# Patient Record
Sex: MALE | Race: BLACK OR AFRICAN AMERICAN | NOT HISPANIC OR LATINO | Employment: OTHER | URBAN - METROPOLITAN AREA
[De-identification: names, ages, dates, MRNs, and addresses within clinical notes are randomized per-mention and may not be internally consistent; named-entity substitution may affect disease eponyms.]

---

## 2018-01-16 NOTE — PROCEDURES
Procedures by Ines Ríos RN at  6/23/2016  1:11 PM      Author:  Ines Ríos RN Service:  (none) Author Type:  Registered Nurse     Filed:  6/23/2016  1:46 PM Date of Service:  6/23/2016  1:11 PM Status:  Signed     :  Ines Ríos RN (Registered Nurse)         Procedure Orders:       1  Insert PICC line [id number] ordered by Malcolm Bhatti at 06/22/16 1043                    Insert PICC line  Date/Time: 6/23/2016 1:11 PM  Performed by: Shae Moore by: Tae Fall     Patient location:  Bedside  Other Assisting Provider:  Yes (comment) (Ines Ríos RN)    Consent:     Consent obtained:  Verbal and written    Consent given by:  Patient    Risks discussed:  Infection    Alternatives discussed:  No treatment and delayed treatment  Universal protocol:     Procedure explained and questions answered to patient or proxy's satisfaction: yes      Relevant documents present and verified: yes      Site/side marked: yes      Immediately prior to procedure,  a time out was called: yes      Patient identity confirmed:  Verbally with patient and arm band  Pre-procedure details:     Hand hygiene: Hand hygiene performed prior to insertion      Sterile barrier technique: All elements of maximal sterile technique followed      Skin preparation:  2% chlorhexidine    Skin preparation agent: Skin preparation agent completely dried prior to procedure    Indications:     PICC line indications: long term antibiotics    Anesthesia (see MAR for exact dosages):      Anesthesia method:  None  Procedure details:     Location:  Basilic    Vessel type: vein      Laterality:  Right    Approach: percutaneous technique used      Patient position:  Flat    Procedural supplies:  Double lumen    Catheter size:  5 Fr    Landmarks identified: yes      Ultrasound guidance: yes      Sterile ultrasound techniques: Sterile gel and sterile probe covers were used      Number of attempts:  1    Successful placement: yes Vessel of catheter tip end:  SVC    Total catheter length (cm):  47    Catheter out on skin (cm):  2    Max flow rate:  5ML/SEC    Arm circumference:  34  Post-procedure details:     Post-procedure:  Dressing applied and securement device placed    Assessment:  Blood return through all ports and free fluid flow    Post-procedure complications: none      Patient tolerance of procedure: Tolerated well, no immediate complications  Comments:      PICC tip placement confirmed using [employer] 3CG [employer]api[employer] technology  Good to use, RN aware                       Received for:Provider  EPIC   Jun 23 2016  1:45PM [facility] Standard Time

## 2018-01-17 ENCOUNTER — HOSPITAL ENCOUNTER (INPATIENT)
Facility: HOSPITAL | Age: 53
LOS: 2 days | Discharge: HOME HEALTH | DRG: 314 | End: 2018-01-19
Attending: PODIATRIST | Admitting: PODIATRIST
Payer: COMMERCIAL

## 2018-01-17 ENCOUNTER — APPOINTMENT (INPATIENT)
Dept: RADIOLOGY | Facility: HOSPITAL | Age: 53
DRG: 314 | End: 2018-01-17
Payer: COMMERCIAL

## 2018-01-17 ENCOUNTER — APPOINTMENT (INPATIENT)
Dept: NON INVASIVE DIAGNOSTICS | Facility: HOSPITAL | Age: 53
DRG: 314 | End: 2018-01-17
Payer: COMMERCIAL

## 2018-01-17 ENCOUNTER — ANESTHESIA EVENT (INPATIENT)
Dept: PERIOP | Facility: HOSPITAL | Age: 53
DRG: 314 | End: 2018-01-17
Payer: COMMERCIAL

## 2018-01-17 DIAGNOSIS — I25.10 CAD IN NATIVE ARTERY: ICD-10-CM

## 2018-01-17 DIAGNOSIS — Z95.810 HISTORY OF IMPLANTABLE CARDIOVERTER-DEFIBRILLATOR (ICD) PLACEMENT: ICD-10-CM

## 2018-01-17 DIAGNOSIS — Z98.890 S/P FOOT SURGERY, RIGHT: ICD-10-CM

## 2018-01-17 DIAGNOSIS — M86.9 TOE OSTEOMYELITIS, RIGHT (HCC): ICD-10-CM

## 2018-01-17 DIAGNOSIS — I10 ESSENTIAL HYPERTENSION: ICD-10-CM

## 2018-01-17 DIAGNOSIS — I25.2 MYOCARDIAL INFARCT, OLD: Primary | ICD-10-CM

## 2018-01-17 LAB
ALBUMIN SERPL BCP-MCNC: 2 G/DL (ref 3.5–5)
ALP SERPL-CCNC: 62 U/L (ref 46–116)
ALT SERPL W P-5'-P-CCNC: 8 U/L (ref 12–78)
ANION GAP SERPL CALCULATED.3IONS-SCNC: 9 MMOL/L (ref 4–13)
AST SERPL W P-5'-P-CCNC: 19 U/L (ref 5–45)
ATRIAL RATE: 67 BPM
BASOPHILS # BLD AUTO: 0.06 THOUSANDS/ΜL (ref 0–0.1)
BASOPHILS NFR BLD AUTO: 1 % (ref 0–1)
BILIRUB SERPL-MCNC: 0.34 MG/DL (ref 0.2–1)
BUN SERPL-MCNC: 19 MG/DL (ref 5–25)
CALCIUM SERPL-MCNC: 8.3 MG/DL (ref 8.3–10.1)
CHLORIDE SERPL-SCNC: 104 MMOL/L (ref 100–108)
CO2 SERPL-SCNC: 26 MMOL/L (ref 21–32)
CREAT SERPL-MCNC: 2.18 MG/DL (ref 0.6–1.3)
CRP SERPL QL: 47 MG/L
EOSINOPHIL # BLD AUTO: 0.19 THOUSAND/ΜL (ref 0–0.61)
EOSINOPHIL NFR BLD AUTO: 2 % (ref 0–6)
ERYTHROCYTE [DISTWIDTH] IN BLOOD BY AUTOMATED COUNT: 12.6 % (ref 11.6–15.1)
ERYTHROCYTE [SEDIMENTATION RATE] IN BLOOD: 118 MM/HOUR (ref 0–10)
EST. AVERAGE GLUCOSE BLD GHB EST-MCNC: 146 MG/DL
GFR SERPL CREATININE-BSD FRML MDRD: 39 ML/MIN/1.73SQ M
GLUCOSE SERPL-MCNC: 115 MG/DL (ref 65–140)
GLUCOSE SERPL-MCNC: 140 MG/DL (ref 65–140)
GLUCOSE SERPL-MCNC: 179 MG/DL (ref 65–140)
HBA1C MFR BLD: 6.7 % (ref 4.2–6.3)
HCT VFR BLD AUTO: 30.6 % (ref 36.5–49.3)
HGB BLD-MCNC: 10.6 G/DL (ref 12–17)
LYMPHOCYTES # BLD AUTO: 3.15 THOUSANDS/ΜL (ref 0.6–4.47)
LYMPHOCYTES NFR BLD AUTO: 32 % (ref 14–44)
MCH RBC QN AUTO: 32.9 PG (ref 26.8–34.3)
MCHC RBC AUTO-ENTMCNC: 34.6 G/DL (ref 31.4–37.4)
MCV RBC AUTO: 95 FL (ref 82–98)
MONOCYTES # BLD AUTO: 1.08 THOUSAND/ΜL (ref 0.17–1.22)
MONOCYTES NFR BLD AUTO: 11 % (ref 4–12)
NEUTROPHILS # BLD AUTO: 5.5 THOUSANDS/ΜL (ref 1.85–7.62)
NEUTS SEG NFR BLD AUTO: 54 % (ref 43–75)
NRBC BLD AUTO-RTO: 0 /100 WBCS
P AXIS: 35 DEGREES
PLATELET # BLD AUTO: 174 THOUSANDS/UL (ref 149–390)
PLATELET # BLD AUTO: 352 THOUSANDS/UL (ref 149–390)
PMV BLD AUTO: 10.2 FL (ref 8.9–12.7)
PMV BLD AUTO: 11 FL (ref 8.9–12.7)
POTASSIUM SERPL-SCNC: 3.4 MMOL/L (ref 3.5–5.3)
PR INTERVAL: 208 MS
PROT SERPL-MCNC: 8.1 G/DL (ref 6.4–8.2)
QRS AXIS: 21 DEGREES
QRSD INTERVAL: 104 MS
QT INTERVAL: 386 MS
QTC INTERVAL: 407 MS
RBC # BLD AUTO: 3.22 MILLION/UL (ref 3.88–5.62)
SODIUM SERPL-SCNC: 139 MMOL/L (ref 136–145)
T WAVE AXIS: -41 DEGREES
VENTRICULAR RATE: 67 BPM
WBC # BLD AUTO: 9.98 THOUSAND/UL (ref 4.31–10.16)

## 2018-01-17 PROCEDURE — 87205 SMEAR GRAM STAIN: CPT | Performed by: PODIATRIST

## 2018-01-17 PROCEDURE — 87186 SC STD MICRODIL/AGAR DIL: CPT | Performed by: PODIATRIST

## 2018-01-17 PROCEDURE — 87147 CULTURE TYPE IMMUNOLOGIC: CPT | Performed by: PODIATRIST

## 2018-01-17 PROCEDURE — 36415 COLL VENOUS BLD VENIPUNCTURE: CPT | Performed by: PODIATRIST

## 2018-01-17 PROCEDURE — 93005 ELECTROCARDIOGRAM TRACING: CPT

## 2018-01-17 PROCEDURE — 85652 RBC SED RATE AUTOMATED: CPT | Performed by: PODIATRIST

## 2018-01-17 PROCEDURE — 83036 HEMOGLOBIN GLYCOSYLATED A1C: CPT | Performed by: PODIATRIST

## 2018-01-17 PROCEDURE — 93005 ELECTROCARDIOGRAM TRACING: CPT | Performed by: PODIATRIST

## 2018-01-17 PROCEDURE — 82948 REAGENT STRIP/BLOOD GLUCOSE: CPT

## 2018-01-17 PROCEDURE — 94760 N-INVAS EAR/PLS OXIMETRY 1: CPT

## 2018-01-17 PROCEDURE — 86140 C-REACTIVE PROTEIN: CPT | Performed by: PODIATRIST

## 2018-01-17 PROCEDURE — 71046 X-RAY EXAM CHEST 2 VIEWS: CPT

## 2018-01-17 PROCEDURE — 93971 EXTREMITY STUDY: CPT

## 2018-01-17 PROCEDURE — 73630 X-RAY EXAM OF FOOT: CPT

## 2018-01-17 PROCEDURE — 99285 EMERGENCY DEPT VISIT HI MDM: CPT

## 2018-01-17 PROCEDURE — 80053 COMPREHEN METABOLIC PANEL: CPT | Performed by: PODIATRIST

## 2018-01-17 PROCEDURE — 85025 COMPLETE CBC W/AUTO DIFF WBC: CPT | Performed by: PODIATRIST

## 2018-01-17 PROCEDURE — 87070 CULTURE OTHR SPECIMN AEROBIC: CPT | Performed by: PODIATRIST

## 2018-01-17 PROCEDURE — 85049 AUTOMATED PLATELET COUNT: CPT | Performed by: PODIATRIST

## 2018-01-17 RX ORDER — METRONIDAZOLE 500 MG/1
500 TABLET ORAL EVERY 8 HOURS SCHEDULED
Status: DISCONTINUED | OUTPATIENT
Start: 2018-01-17 | End: 2018-01-19 | Stop reason: HOSPADM

## 2018-01-17 RX ORDER — SIMVASTATIN 10 MG
10 TABLET ORAL
COMMUNITY
End: 2019-04-25 | Stop reason: ALTCHOICE

## 2018-01-17 RX ORDER — SODIUM CHLORIDE 9 MG/ML
75 INJECTION, SOLUTION INTRAVENOUS CONTINUOUS
Status: DISCONTINUED | OUTPATIENT
Start: 2018-01-17 | End: 2018-01-19 | Stop reason: HOSPADM

## 2018-01-17 RX ORDER — HEPARIN SODIUM 5000 [USP'U]/ML
5000 INJECTION, SOLUTION INTRAVENOUS; SUBCUTANEOUS EVERY 8 HOURS SCHEDULED
Status: DISCONTINUED | OUTPATIENT
Start: 2018-01-17 | End: 2018-01-19 | Stop reason: HOSPADM

## 2018-01-17 RX ORDER — ASPIRIN 81 MG/1
81 TABLET, CHEWABLE ORAL DAILY
Status: DISCONTINUED | OUTPATIENT
Start: 2018-01-18 | End: 2018-01-17

## 2018-01-17 RX ORDER — PRAVASTATIN SODIUM 20 MG
20 TABLET ORAL
Status: DISCONTINUED | OUTPATIENT
Start: 2018-01-17 | End: 2018-01-19 | Stop reason: HOSPADM

## 2018-01-17 RX ORDER — NIFEDIPINE 30 MG/1
30 TABLET, EXTENDED RELEASE ORAL DAILY
Status: DISCONTINUED | OUTPATIENT
Start: 2018-01-17 | End: 2018-01-19 | Stop reason: HOSPADM

## 2018-01-17 RX ORDER — METOPROLOL SUCCINATE 25 MG/1
25 TABLET, EXTENDED RELEASE ORAL DAILY
Status: DISCONTINUED | OUTPATIENT
Start: 2018-01-18 | End: 2018-01-17

## 2018-01-17 RX ORDER — ACETAMINOPHEN 325 MG/1
650 TABLET ORAL EVERY 6 HOURS PRN
Status: DISCONTINUED | OUTPATIENT
Start: 2018-01-17 | End: 2018-01-19 | Stop reason: HOSPADM

## 2018-01-17 RX ORDER — ASPIRIN 81 MG/1
81 TABLET, CHEWABLE ORAL DAILY
Status: DISCONTINUED | OUTPATIENT
Start: 2018-01-17 | End: 2018-01-19 | Stop reason: HOSPADM

## 2018-01-17 RX ORDER — METOPROLOL SUCCINATE 25 MG/1
25 TABLET, EXTENDED RELEASE ORAL DAILY
Status: DISCONTINUED | OUTPATIENT
Start: 2018-01-17 | End: 2018-01-19 | Stop reason: HOSPADM

## 2018-01-17 RX ORDER — NIFEDIPINE 30 MG/1
30 TABLET, FILM COATED, EXTENDED RELEASE ORAL DAILY
COMMUNITY
End: 2019-01-24 | Stop reason: SDDI

## 2018-01-17 RX ORDER — NIFEDIPINE 30 MG/1
30 TABLET, EXTENDED RELEASE ORAL DAILY
Status: DISCONTINUED | OUTPATIENT
Start: 2018-01-18 | End: 2018-01-17

## 2018-01-17 RX ORDER — METOPROLOL SUCCINATE 25 MG/1
25 TABLET, EXTENDED RELEASE ORAL DAILY
COMMUNITY
End: 2018-08-11 | Stop reason: SDUPTHER

## 2018-01-17 RX ADMIN — PRAVASTATIN SODIUM 20 MG: 20 TABLET ORAL at 17:14

## 2018-01-17 RX ADMIN — METRONIDAZOLE 500 MG: 500 TABLET ORAL at 22:05

## 2018-01-17 RX ADMIN — HEPARIN SODIUM 5000 UNITS: 5000 INJECTION, SOLUTION INTRAVENOUS; SUBCUTANEOUS at 22:05

## 2018-01-17 RX ADMIN — METRONIDAZOLE 500 MG: 500 TABLET ORAL at 17:14

## 2018-01-17 RX ADMIN — HEPARIN SODIUM 5000 UNITS: 5000 INJECTION, SOLUTION INTRAVENOUS; SUBCUTANEOUS at 14:36

## 2018-01-17 RX ADMIN — NIFEDIPINE 30 MG: 30 TABLET, FILM COATED, EXTENDED RELEASE ORAL at 17:14

## 2018-01-17 RX ADMIN — ASPIRIN 81 MG 81 MG: 81 TABLET ORAL at 17:15

## 2018-01-17 RX ADMIN — METOPROLOL SUCCINATE 25 MG: 25 TABLET, EXTENDED RELEASE ORAL at 17:15

## 2018-01-17 RX ADMIN — CEFAZOLIN SODIUM 1000 MG: 1 SOLUTION INTRAVENOUS at 17:14

## 2018-01-17 RX ADMIN — SODIUM CHLORIDE 75 ML/HR: 0.9 INJECTION, SOLUTION INTRAVENOUS at 12:30

## 2018-01-17 NOTE — ANESTHESIA PREPROCEDURE EVALUATION
Review of Systems/Medical History  Patient summary reviewed    No history of anesthetic complications     Cardiovascular  EKG reviewed, Hyperlipidemia, Hypertension controlled, Past MI > 6 months, CAD, ,   Comment: Has ICD    H/o MI with cocaine use in remote past ,  Pulmonary  Smoker ex-smoker  ,        GI/Hepatic       Chronic kidney disease stage 2,        Endo/Other  Diabetes poorly controlled type 2 ,   Comment: [place] Obesity  morbid obesity   GYN       Hematology   Musculoskeletal       Neurology    Neuromuscular disease ,   Comment: Peripheral neuropathy  Psychology   Depression ,              Physical Exam    Airway    Mallampati score: II  TM Distance: >3 FB  Neck ROM: full     Dental   lower dentures and upper dentures,     Cardiovascular  Rhythm: regular, Rate: normal,     Pulmonary  Pulmonary exam normal     Other Findings        Anesthesia Plan  ASA Score- 3     Anesthesia Type- IV sedation with anesthesia with ASA Monitors  Additional Monitors:   Airway Plan:         Plan Factors-    Induction- intravenous  Postoperative Plan-     Informed Consent- Anesthetic plan and risks discussed with patient

## 2018-01-17 NOTE — CASE MANAGEMENT
Initial Clinical Review    Admission: Date/Time/Statement: 1/17/18 @ 0959     Orders Placed This Encounter   Procedures    Inpatient Admission     Standing Status:   Standing     Number of Occurrences:   1     Order Specific Question:   Admitting Physician     Answer:   Dunia Moore     Order Specific Question:   Level of Care     Answer:   Med Surg [16]     Order Specific Question:   Estimated length of stay     Answer:   More than 2 Midnights     Order Specific Question:   Certification     Answer:   I certify that inpatient services are medically necessary for this patient for a duration of greater than two midnights  See H&P and MD Progress Notes for additional information about the patient's course of treatment  Comments:   IV antibiotics and surgical intervention         ED: Date/Time/Mode of Arrival:   ED Arrival Information     Expected Arrival Acuity Means of Arrival Escorted By Service Admission Type    - 1/17/2018 08:36 Urgent Walk-In Self Podiatry Urgent    Arrival Complaint    Toe Problem          Chief Complaint:   Chief Complaint   Patient presents with    Toe Pain     Reports to come to ED for ambutation of second digit on R foot; surgeon told him to come to ED  History of Illness:    Patient is a pleasant 78-year-old male with past medical history significant for CAD, hypertension, diabetes mellitus with peripheral neuropathy and history of MI with ICD placement  He presents today for concern over worsening right 2nd toe infection with wound  Patient states that he was recently on Keflex and finished his last dose on Friday and states that he was feeling better while on the antibiotics but since this has been completed he has noticed increased drainage and night sweats    He states that he has had this right 2nd toe wound for approximately 3 months and has been doing local wound care with visiting nurses and frequent follow-up visits with Dr Khalida Rivera, his podiatrist   He does have history of recurrent ulcerations and history of amputation, most notably to left foot transmetatarsal amputation and right foot partial 1st ray amputation  Patient has no feeling to the distal the bilaterally  He currently wears diabetic shoe with a filler to the left and a surgical shoe to the right  He states that within the last week he has noticed night sweats and chills  He denies any nausea, vomiting, fever, constipation, diarrhea, blurry vision, headaches, abdominal pain or lower extremity weakness       Of note, patient states that he recently started seeing a nephrologist for further workup of protein urea and microscopic blood within the urine  He currently states that he has no issues urinating at present time  He has follow-up scheduled with his nephrologist at [facility]         ED Vital Signs:   ED Triage Vitals   Temperature Pulse Respirations Blood Pressure SpO2   01/17/18 0843 01/17/18 0843 01/17/18 0843 01/17/18 0845 01/17/18 0843   99 3 °F (37 4 °C) 91 18 (!) 175/82 100 %      Temp Source Heart Rate Source Patient Position - Orthostatic VS BP Location FiO2 (%)   01/17/18 0843 01/17/18 1439 01/17/18 1439 01/17/18 1439 --   Oral Monitor Lying Right arm       Pain Score       01/17/18 0843       5        Wt Readings from Last 1 Encounters:   01/17/18 124 kg (274 lb 0 5 oz)       Vital Signs (abnormal): Above    Abnormal Labs/Diagnostic Test Results:   Bl   VDE:  No  Evidence  DVT  X ray  R  Foot:        Bony irregularity 2nd digit at the head of the proximal phalanx/PIP joint   Difficult to determine with certainty if this is post traumatic in nature or potentially related to osteomyelitis given flexion of the distal digit   I slightly favor   osteomyelitis   If relevant cone-down imaging of the 2nd digit (flattening the digit to eliminate flexion) may be helpful   Follow-up is advised          CXR:     NAD    ED Treatment:   Medication Administration from 01/17/2018 0835 to 01/17/2018 1536       Date/Time Order Dose Route Action Action by Comments     01/17/2018 1436 heparin (porcine) subcutaneous injection 5,000 Units 5,000 Units Subcutaneous Given Ruth Ramos RN      01/17/2018 1356 insulin lispro (HumaLOG) 100 units/mL subcutaneous injection 1-5 Units 1 Units Subcutaneous Not Given Wyatt Serrano RN Pt refusing to have his glucose checked  01/17/2018 1230 sodium chloride 0 9 % infusion 75 mL/hr Intravenous New Bag Wyatt Serrano RN           Past Medical/Surgical History: Active Ambulatory Problems     Diagnosis Date Noted    Cellulitis of foot, left 02/02/2016    Dehiscence of surgical wound 02/02/2016    Acute osteomyelitis of metatarsal bone of left foot ([facility] [address] ) 06/13/2016    Peripheral neuropathy     Myocardial infarct, old 01/01/2006    Hypertension     History of implantable cardioverter-defibrillator (ICD) placement     Type 2 diabetes mellitus with hyperlipidemia ([facility] [address] )     CAD in native artery     Acute right ankle pain 07/12/2016    Hypokalemia 07/13/2016    Anemia 07/13/2016    Hypovitaminosis D 07/16/2016     Resolved Ambulatory Problems     Diagnosis Date Noted    No Resolved Ambulatory Problems     Past Medical History:   Diagnosis Date    CAD in native artery     Depression     Diabetes mellitus ([facility] [address] )     History of implantable cardioverter-defibrillator (ICD) placement     Hypertension     Myocardial infarct, old 2006    Peripheral neuropathy        Admitting Diagnosis: Toe pain [M79 [id number]]    Age/Sex: 47 y o  male    Assessment/Plan:      50-year-old male that presents with a right 2nd toe dorsal ulceration with concern for underlying osteomyelitis secondary to diabetes mellitus with peripheral neuropathy  -wound cultures taken in the emergency department  Previously, patient has had MSSA and his last cultures  Will start on Ancef and Flagyl    Per medical records, patient has tolerated and Ancef during prior hospitalization despite the penicillin allergy  -x-rays pending however osteomyelitis likely because of bone exposed with an 2nd toe wound with softened cortices  -for consult Internal Medicine for surgical clearance and medical management  -current dressed with dry sterile dressing  We will plan for OR intervention at 11:30 a m  for right 2nd toe amp tomorrow with Dr Kalli Mercer  Consent will be signed and reviewed by surgeon tomorrow  NPO at midnight  Will hold heparin dose tomorrow   -weight-bearing in surgical shoe until surgery  -CBC, CMP, A1c, ESR, CRP pending  -fortunately, vital signs stable  -venous duplex pending for right calf pain        2  Diabetes mellitus, type 2  -will hold patient's Januvia and jardiance while in house  Will place on insulin sliding scale t i d , HS   -A1c pending  No recent A1c in patient's chart  -of note, patient states has been having proteinuria and microscopic blood within the urine  Well as Internal Medicine to evaluate for further workup  3   Hypertension  -continue metoprolol and nifedipine     4  Coronary artery disease  -EKG pending for preop clearance  -continue aspirin and statin  -history of ICD placement  -history of MI in 2006     5  DVT prophylaxis  -heparin  -patient complains of right calf pain that is unrelenting, especially with palpation to area  Will order a venous duplex        6   Code status:  Level 1 full code discussed with patient     Disposition:  Patient will be admitted to hospital for minimum 2 midnight stay for surgical intervention tomorrow and IV antibiotics      Admission Orders:   IP    1/17  @    1004  Scheduled Meds:   heparin (porcine) 5,000 Units Subcutaneous Q8H [address]   insulin lispro 1-5 Units Subcutaneous TID AC   insulin lispro 1-5 Units Subcutaneous HS     Continuous Infusions:   sodium chloride 75 mL/hr Last Rate: 75 mL/hr (01/17/18 1230)     PRN Meds:   acetaminophen     Cons IM  Reg diet  Wound  C/s  Plan  OR   1/18    Thank you,  [address] [facility] in [facility] by Jaylon Morrison for 2017  Network Utilization Review Department  Phone: [phone]; Fax [phone]  ATTENTION: The Network Utilization Review Department is now centralized for our 7 Facilities  Make a note that we have a new phone and fax numbers for our Department  Please call with any questions or concerns to [phone] and carefully follow the prompts so that you are directed to the right person  All voicemails are confidential  Fax any determinations, approvals, denials, and requests for initial or continue stay review clinical to [phone]  Due to HIGH CALL volume, it would be easier if you could please send faxed requests to expedite your requests and in part, help us provide discharge notifications faster

## 2018-01-17 NOTE — ED NOTES
Patient refusing fingerstick  Stating, "It hurts too much when you use your needles " Pt attempted to use his own lancet, but it was dull and he was unable to use it  Pt reporting that "I don't have to eat, if it makes a difference " Pt educated that it is important to get sugar level       Yamilka Lindquist, RN  01/17/18 [address], RN  01/17/18 2346

## 2018-01-17 NOTE — H&P
H&P Exam - Michaelmaurisio Wilkes  47 y o  male MRN: [id number]    Unit/Bed#: ED 14 Encounter: [id number]    Assessment/Plan:  3  70-year-old male that presents with a right 2nd toe dorsal ulceration with concern for underlying osteomyelitis secondary to diabetes mellitus with peripheral neuropathy  -wound cultures taken in the emergency department  Previously, patient has had MSSA and his last cultures  Will start on Ancef and Flagyl  Per medical records, patient has tolerated and Ancef during prior hospitalization despite the penicillin allergy  -x-rays pending however osteomyelitis likely because of bone exposed with an 2nd toe wound with softened cortices  -for consult Internal Medicine for surgical clearance and medical management  -current dressed with dry sterile dressing  We will plan for OR intervention at 11:30 a m  for right 2nd toe amp tomorrow with Dr Db Vázquez  Consent will be signed and reviewed by surgeon tomorrow  NPO at midnight  Will hold heparin dose tomorrow   -weight-bearing in surgical shoe until surgery  -CBC, CMP, A1c, ESR, CRP pending  -fortunately, vital signs stable  -venous duplex pending for right calf pain      2  Diabetes mellitus, type 2  -will hold patient's Januvia and jardiance while in house  Will place on insulin sliding scale t i d , HS   -A1c pending  No recent A1c in patient's chart  -of note, patient states has been having proteinuria and microscopic blood within the urine  Well as Internal Medicine to evaluate for further workup  3   Hypertension  -continue metoprolol and nifedipine    4  Coronary artery disease  -EKG pending for preop clearance  -continue aspirin and statin  -history of ICD placement  -history of MI in 2006    5  DVT prophylaxis  -heparin  -patient complains of right calf pain that is unrelenting, especially with palpation to area  Will order a venous duplex        6   Code status:  Level 1 full code discussed with patient    Disposition: Patient will be admitted to hospital for minimum 2 midnight stay for surgical intervention tomorrow and IV antibiotics  History of Present Illness   Patient is a pleasant 50-year-old male with past medical history significant for CAD, hypertension, diabetes mellitus with peripheral neuropathy and history of MI with ICD placement  He presents today for concern over worsening right 2nd toe infection with wound  Patient states that he was recently on Keflex and finished his last dose on Friday and states that he was feeling better while on the antibiotics but since this has been completed he has noticed increased drainage and night sweats  He states that he has had this right 2nd toe wound for approximately 3 months and has been doing local wound care with visiting nurses and frequent follow-up visits with Dr Arabella Melendrez, his podiatrist   He does have history of recurrent ulcerations and history of amputation, most notably to left foot transmetatarsal amputation and right foot partial 1st ray amputation  Patient has no feeling to the distal the bilaterally  He currently wears diabetic shoe with a filler to the left and a surgical shoe to the right  He states that within the last week he has noticed night sweats and chills  He denies any nausea, vomiting, fever, constipation, diarrhea, blurry vision, headaches, abdominal pain or lower extremity weakness  Of note, patient states that he recently started seeing a nephrologist for further workup of protein urea and microscopic blood within the urine  He currently states that he has no issues urinating at present time  He has follow-up scheduled with his nephrologist at [facility]  Review of Systems   Constitutional: Positive for chills  Negative for activity change and fever  Eyes: Negative for visual disturbance  Cardiovascular: Positive for leg swelling  Negative for chest pain     Gastrointestinal: Negative for abdominal pain, constipation, diarrhea, nausea and vomiting  Genitourinary: Negative for difficulty urinating and flank pain  Musculoskeletal: Positive for joint swelling  Skin: Positive for color change and wound  Neurological: Positive for numbness  Negative for weakness and headaches  Historical Information   Past Medical History:   Diagnosis Date    CAD in native artery     Depression     Diabetes mellitus ([facility] [address] [facility]     History of implantable cardioverter-defibrillator (ICD) placement     [employer]    Hypertension     Myocardial infarct, old 2006    in setting of cocaine use   Peripheral neuropathy      Past Surgical History:   Procedure Laterality Date    APPENDECTOMY      CARDIAC DEFIBRILLATOR PLACEMENT  2006    MASTECTOMY FOR GYNECOMASTIA  09/2015    TOE AMPUTATION      Hallux amputation    WOUND DEBRIDEMENT Left 6/16/2016    Procedure: DEBRIDEMENT FOOT/TOE [facility] OUT); Transmetatasral vs Lisfranc amputation , bone biopsy,;  Surgeon: Jeremiah Coley DPM;  Location: [facility];  Service:      Social History   History   Alcohol Use    Yes     Comment: social-every few months     History   Drug Use    Frequency: 1 0 time per week    Types: Marijuana     History   Smoking Status    Former Smoker    Packs/day: 1 00    Years: 18 00    Types: Cigarettes    Start date: 80    Quit date: 2006   Smokeless Tobacco    Never Used     Family History: non-contributory    Meds/Allergies   PTA meds:   Prior to Admission Medications   Prescriptions Last Dose Informant Patient Reported? Taking?    Empagliflozin (JARDIANCE) 25 MG TABS 1/16/2018 at Unknown time  Yes Yes   Sig: Take 25 mg by mouth every morning   NIFEdipine ER (ADALAT CC) 30 MG 24 hr tablet 1/16/2018 at Unknown time  Yes Yes   Sig: Take 30 mg by mouth daily   aspirin 81 MG tablet 1/16/2018 at Unknown time  Yes Yes   Sig: Take 81 mg by mouth daily   metoprolol succinate (TOPROL-XL) 25 mg 24 hr tablet 1/16/2018 at Unknown time  Yes Yes   Sig: Take 25 mg by mouth daily   simvastatin (ZOCOR) 10 mg tablet 1/16/2018 at Unknown time  Yes Yes   Sig: Take 10 mg by mouth daily at bedtime   sitaGLIPtin (JANUVIA) 50 mg tablet 1/16/2018 at Unknown time  Yes Yes   Sig: Take 50 mg by mouth daily      Facility-Administered Medications: None     Allergies   Allergen Reactions    Penicillins        Objective   First Vitals:   Blood Pressure: (!) 175/82 (01/17/18 0845)  Pulse: 91 (01/17/18 0843)  Temperature: 99 3 °F (37 4 °C) (01/17/18 0843)  Temp Source: Oral (01/17/18 0843)  Respirations: 18 (01/17/18 0843)  Weight - Scale: 124 kg (274 lb 0 5 oz) (01/17/18 0843)  SpO2: 100 % (01/17/18 0843)    Current Vitals:   Blood Pressure: (!) 175/82 (01/17/18 0845)  Pulse: 91 (01/17/18 0843)  Temperature: 99 3 °F (37 4 °C) (01/17/18 0843)  Temp Source: Oral (01/17/18 0843)  Respirations: 18 (01/17/18 0843)  Weight - Scale: 124 kg (274 lb 0 5 oz) (01/17/18 0843)  SpO2: 100 % (01/17/18 0843)    No intake or output data in the 24 hours ending 01/17/18 1013    Invasive Devices     Peripheral Intravenous Line            Peripheral IV 01/17/18 Left Antecubital less than 1 day                Physical Exam   Constitutional: He is oriented to person, place, and time  He appears well-developed and well-nourished  HENT:   Head: Normocephalic and atraumatic  Eyes: Conjunctivae are normal  Pupils are equal, round, and reactive to light  Neck: Normal range of motion  Cardiovascular: Normal rate, regular rhythm, S1 normal, S2 normal and intact distal pulses  Pulses:       Dorsalis pedis pulses are 2+ on the right side, and 2+ on the left side  Posterior tibial pulses are 2+ on the right side, and 2+ on the left side  DP and PT pulses palpable and dopplerable   Pulmonary/Chest: Effort normal and breath sounds normal  No accessory muscle usage  No respiratory distress  Abdominal: Soft  There is no tenderness  There is no guarding  Obese   Musculoskeletal: Normal range of motion     5/5 MSK strength to pedal joints bilaterally  No pain to palpation to right foot or right 2nd toe wound  Right calf pain with compression and squeeze  History of right partial 1st ray amputation  History of left transmetatarsal amputation  Right foot with digital contractures and right 2nd toe subluxation  Neurological: He is alert and oriented to person, place, and time  He has normal strength  A sensory deficit is present  No cranial nerve deficit  Protective and gross sensation absent to distal lower extremities bilaterally   Skin: Skin is warm and dry  There is a full-thickness ulceration to the dorsal right 2nd toe that measures 1 4 cm by 0 5 cm at greatest dimension  This probes 0 6 cm proximally and probes to soft cortices of phalanges which are exposed through the wound  There is purulence exuding from wound which was cultured  Wound is fibrotic in nature  Mild malodor  There are hyperkeratotic lesions to the right partial 1st ray amputation site which is healed in its entirety  No other wounds or preulcerative lesions noted  Psychiatric: His speech is normal and behavior is normal  He exhibits a depressed mood  Lab Results:  CBC, CMP, ESR, CRP, A1c pending  Imaging:  X-ray pending forefoot and chest  EKG, Pathology, and Other Studies:  EKG and wound cultures pending    Code Status: Level 1 - Full Code  Advance Directive and Living Will:      Power of :    POLST:      Counseling / Coordination of Care: Total floor / unit time spent today 45 minutes

## 2018-01-18 ENCOUNTER — APPOINTMENT (INPATIENT)
Dept: RADIOLOGY | Facility: HOSPITAL | Age: 53
DRG: 314 | End: 2018-01-18
Payer: COMMERCIAL

## 2018-01-18 ENCOUNTER — ANESTHESIA (INPATIENT)
Dept: PERIOP | Facility: HOSPITAL | Age: 53
DRG: 314 | End: 2018-01-18
Payer: COMMERCIAL

## 2018-01-18 LAB
ANION GAP SERPL CALCULATED.3IONS-SCNC: 4 MMOL/L (ref 4–13)
BUN SERPL-MCNC: 16 MG/DL (ref 5–25)
CALCIUM SERPL-MCNC: 7.9 MG/DL (ref 8.3–10.1)
CHLORIDE SERPL-SCNC: 108 MMOL/L (ref 100–108)
CO2 SERPL-SCNC: 28 MMOL/L (ref 21–32)
CREAT SERPL-MCNC: 1.81 MG/DL (ref 0.6–1.3)
ERYTHROCYTE [DISTWIDTH] IN BLOOD BY AUTOMATED COUNT: 12.5 % (ref 11.6–15.1)
GFR SERPL CREATININE-BSD FRML MDRD: 48 ML/MIN/1.73SQ M
GLUCOSE SERPL-MCNC: 106 MG/DL (ref 65–140)
GLUCOSE SERPL-MCNC: 152 MG/DL (ref 65–140)
GLUCOSE SERPL-MCNC: 191 MG/DL (ref 65–140)
HCT VFR BLD AUTO: 32.8 % (ref 36.5–49.3)
HGB BLD-MCNC: 11.5 G/DL (ref 12–17)
INR PPP: 1.13 (ref 0.86–1.16)
MCH RBC QN AUTO: 33 PG (ref 26.8–34.3)
MCHC RBC AUTO-ENTMCNC: 35.1 G/DL (ref 31.4–37.4)
MCV RBC AUTO: 94 FL (ref 82–98)
PLATELET # BLD AUTO: 292 THOUSANDS/UL (ref 149–390)
PMV BLD AUTO: 10.3 FL (ref 8.9–12.7)
POTASSIUM SERPL-SCNC: 3.4 MMOL/L (ref 3.5–5.3)
PROTHROMBIN TIME: 14.5 SECONDS (ref 12.1–14.4)
RBC # BLD AUTO: 3.49 MILLION/UL (ref 3.88–5.62)
SODIUM SERPL-SCNC: 140 MMOL/L (ref 136–145)
WBC # BLD AUTO: 7.16 THOUSAND/UL (ref 4.31–10.16)

## 2018-01-18 PROCEDURE — 88311 DECALCIFY TISSUE: CPT | Performed by: PODIATRIST

## 2018-01-18 PROCEDURE — 82948 REAGENT STRIP/BLOOD GLUCOSE: CPT

## 2018-01-18 PROCEDURE — 88305 TISSUE EXAM BY PATHOLOGIST: CPT | Performed by: PODIATRIST

## 2018-01-18 PROCEDURE — 85027 COMPLETE CBC AUTOMATED: CPT | Performed by: PODIATRIST

## 2018-01-18 PROCEDURE — 0Y6R0Z0 DETACHMENT AT RIGHT 2ND TOE, COMPLETE, OPEN APPROACH: ICD-10-PCS | Performed by: PODIATRIST

## 2018-01-18 PROCEDURE — 80048 BASIC METABOLIC PNL TOTAL CA: CPT | Performed by: PODIATRIST

## 2018-01-18 PROCEDURE — 85610 PROTHROMBIN TIME: CPT | Performed by: PODIATRIST

## 2018-01-18 PROCEDURE — 73630 X-RAY EXAM OF FOOT: CPT

## 2018-01-18 RX ORDER — ONDANSETRON 2 MG/ML
INJECTION INTRAMUSCULAR; INTRAVENOUS AS NEEDED
Status: DISCONTINUED | OUTPATIENT
Start: 2018-01-18 | End: 2018-01-18 | Stop reason: SURG

## 2018-01-18 RX ORDER — MIDAZOLAM HYDROCHLORIDE 1 MG/ML
INJECTION INTRAMUSCULAR; INTRAVENOUS AS NEEDED
Status: DISCONTINUED | OUTPATIENT
Start: 2018-01-18 | End: 2018-01-18 | Stop reason: SURG

## 2018-01-18 RX ORDER — FENTANYL CITRATE 50 UG/ML
50 INJECTION, SOLUTION INTRAMUSCULAR; INTRAVENOUS
Status: DISCONTINUED | OUTPATIENT
Start: 2018-01-18 | End: 2018-01-18 | Stop reason: HOSPADM

## 2018-01-18 RX ORDER — PROPOFOL 10 MG/ML
INJECTION, EMULSION INTRAVENOUS AS NEEDED
Status: DISCONTINUED | OUTPATIENT
Start: 2018-01-18 | End: 2018-01-18

## 2018-01-18 RX ORDER — PROPOFOL 10 MG/ML
INJECTION, EMULSION INTRAVENOUS CONTINUOUS PRN
Status: DISCONTINUED | OUTPATIENT
Start: 2018-01-18 | End: 2018-01-18 | Stop reason: SURG

## 2018-01-18 RX ORDER — ONDANSETRON 2 MG/ML
4 INJECTION INTRAMUSCULAR; INTRAVENOUS EVERY 6 HOURS PRN
Status: DISCONTINUED | OUTPATIENT
Start: 2018-01-18 | End: 2018-01-18 | Stop reason: HOSPADM

## 2018-01-18 RX ORDER — OXYCODONE HYDROCHLORIDE AND ACETAMINOPHEN 5; 325 MG/1; MG/1
2 TABLET ORAL EVERY 6 HOURS PRN
Status: DISCONTINUED | OUTPATIENT
Start: 2018-01-18 | End: 2018-01-19 | Stop reason: HOSPADM

## 2018-01-18 RX ORDER — OXYCODONE HYDROCHLORIDE AND ACETAMINOPHEN 5; 325 MG/1; MG/1
1 TABLET ORAL EVERY 6 HOURS PRN
Status: DISCONTINUED | OUTPATIENT
Start: 2018-01-18 | End: 2018-01-19 | Stop reason: HOSPADM

## 2018-01-18 RX ORDER — FENTANYL CITRATE 50 UG/ML
INJECTION, SOLUTION INTRAMUSCULAR; INTRAVENOUS AS NEEDED
Status: DISCONTINUED | OUTPATIENT
Start: 2018-01-18 | End: 2018-01-18 | Stop reason: SURG

## 2018-01-18 RX ADMIN — FENTANYL CITRATE 50 MCG: 50 INJECTION INTRAMUSCULAR; INTRAVENOUS at 12:13

## 2018-01-18 RX ADMIN — PRAVASTATIN SODIUM 20 MG: 20 TABLET ORAL at 16:37

## 2018-01-18 RX ADMIN — CEFAZOLIN SODIUM 1000 MG: 1 SOLUTION INTRAVENOUS at 00:11

## 2018-01-18 RX ADMIN — INSULIN LISPRO 1 UNITS: 100 INJECTION, SOLUTION INTRAVENOUS; SUBCUTANEOUS at 21:33

## 2018-01-18 RX ADMIN — METRONIDAZOLE 500 MG: 500 TABLET ORAL at 05:45

## 2018-01-18 RX ADMIN — INSULIN LISPRO 1 UNITS: 100 INJECTION, SOLUTION INTRAVENOUS; SUBCUTANEOUS at 16:37

## 2018-01-18 RX ADMIN — SODIUM CHLORIDE: 0.9 INJECTION, SOLUTION INTRAVENOUS at 12:49

## 2018-01-18 RX ADMIN — CEFAZOLIN SODIUM 1000 MG: 1 SOLUTION INTRAVENOUS at 08:36

## 2018-01-18 RX ADMIN — PROPOFOL 120 MCG/KG/MIN: 10 INJECTION, EMULSION INTRAVENOUS at 12:13

## 2018-01-18 RX ADMIN — METRONIDAZOLE 500 MG: 500 TABLET ORAL at 21:33

## 2018-01-18 RX ADMIN — SODIUM CHLORIDE 75 ML/HR: 0.9 INJECTION, SOLUTION INTRAVENOUS at 03:34

## 2018-01-18 RX ADMIN — ONDANSETRON HYDROCHLORIDE 4 MG: 2 INJECTION, SOLUTION INTRAVENOUS at 12:13

## 2018-01-18 RX ADMIN — NIFEDIPINE 30 MG: 30 TABLET, FILM COATED, EXTENDED RELEASE ORAL at 08:37

## 2018-01-18 RX ADMIN — LIDOCAINE HYDROCHLORIDE 100 MG: 20 INJECTION, SOLUTION INTRAVENOUS at 12:13

## 2018-01-18 RX ADMIN — CEFAZOLIN SODIUM 1000 MG: 1 SOLUTION INTRAVENOUS at 16:36

## 2018-01-18 RX ADMIN — METOPROLOL SUCCINATE 25 MG: 25 TABLET, EXTENDED RELEASE ORAL at 08:37

## 2018-01-18 RX ADMIN — MIDAZOLAM HYDROCHLORIDE 4 MG: 1 INJECTION, SOLUTION INTRAMUSCULAR; INTRAVENOUS at 12:10

## 2018-01-18 NOTE — PROGRESS NOTES
Progress Note - Podiatry  Radha Howard  47 y o  male MRN: [id number]  Unit/Bed#: [place] [address] -01 Encounter: [id number]    Assessment/Plan:  3  58-year-old male with a right 2nd toe dorsal ulceration with concern for underlying osteomyelitis secondary to diabetes mellitus with peripheral neuropathy  -afebrile, no leukocytosis, nontoxic in appearance   -wound culture: no polys or bacteria seen, however patient has history of MSSA   -c/w ancef and Flagyl  Per medical records, patient has tolerated and Ancef during prior hospitalization despite the penicillin allergy  -x-rays with bony irregularity at the head of the proximal phalanx/PIPJ, difficult to determine if post-traumatic vs osteomyelitis, however clinical presentation consistent with osteomyelitis   -Internal Medicine consulted for surgical clearance and medical management  -spoke with internal medicine and they are aware of surgical plan, will see prior to OR   -dressing left intact, to be changed next in OR  -plan for OR intervention today at 11:30 a m  for right 2nd toe amputation with Dr Andrew Darby  -consent to be signed by attending and patient prior to procedure   -compliant with NPO order   -WBAT in sx shoe   -ESR: 118, CRP: 47, A1c: 6 7   -venous duplex with no evidence of DVT      2   Diabetes mellitus, type 2  -will hold patient's Januvia and jardiance while in house, c/w SSI while in-house   -A1c: 6 7      3  Hypertension  -continue metoprolol and nifedipine     4  Coronary artery disease  -EKG with normal sinus rhythm   -continue aspirin and statin  -history of ICD placement  -history of MI in 2006     5  DVT prophylaxis  -heparin  -venous duplex negative      6  Code status:  Level 1 full code discussed with patient     Disposition: Continue to require inpatient admission for IV antibiotics and surgical intervention       Subjective/Objective   Chief Complaint:   Chief Complaint   Patient presents with    Toe Pain     Reports to come to ED for ambutation of second digit on R foot; surgeon told him to come to ED  Subjective: 47 y o  y/o male was seen and evaluated at bedside  Denies constitutional symptoms  Aware of surgical plan  Compliant with NPO order  Blood pressure 150/82, pulse 63, temperature 97 8 °F (36 6 °C), temperature source Temporal, resp  rate 18, weight 124 kg (274 lb 0 5 oz), SpO2 98 %  ,Body mass index is 36 15 kg/m²  Invasive Devices     Peripheral Intravenous Line            Peripheral IV 01/17/18 Left Antecubital less than 1 day              Physical Exam:   General: Alert, cooperative and no distress  Lungs: Non labored breathing  Abdomen: Soft, non-tender  Extremity: right 2nd foot with dressing intact, NVS and motor functions baseline, h/o L foot TMA, no acute signs of infection       Lab, Imaging and other studies:   CBC:   Lab Results   Component Value Date    WBC 9 98 01/17/2018    HGB 10 6 (L) 01/17/2018    HCT 30 6 (L) 01/17/2018    MCV 95 01/17/2018     01/17/2018    MCH 32 9 01/17/2018    MCHC 34 6 01/17/2018    RDW 12 6 01/17/2018    MPV 11 0 01/17/2018    NRBC 0 01/17/2018     Imaging: I have personally reviewed pertinent films in PACS  EKG, Pathology, and Other Studies: I have personally reviewed pertinent reports    VTE Pharmacologic Prophylaxis: Heparin, ASA

## 2018-01-18 NOTE — OP NOTE
OPERATIVE REPORT  PATIENT NAME: Sirena Ocasio  :  1965  MRN: [id number]  Pt Location: [facility] OR ROOM 02    SURGERY DATE: 2018    Surgeon(s) and Role:     * Qi Guajardo DPM - Primary     * Sarah Blakely DPM - Assisting    Preop Diagnosis:  Toe osteomyelitis, right ([facility] [address] ) [M86 9]    Post-Op Diagnosis Codes:     * Toe osteomyelitis, right ([facility] [address] ) [M86 9]    Procedure(s) (LRB):  AMPUTATION 2ND TOE (Right)    Specimen(s):  ID Type Source Tests Collected by Time Destination   1 : Right 2nd Toe  Tissue Toe, Right TISSUE EXAM Qi Guajardo DPM 2018 1238      Hemostasis: PNAT about right ankle at 250mmHg for 21 minutes     Estimated Blood Loss:   Minimal    Materials: nylon, xeroform, DSD, kerlix, ACE     Injectables: 10cc 1:1 mix of 1% lidocaine plain and 0 25% marcaine plain     Complications: None     Anesthesia Type:   Choice + local     Operative Indications:  Toe osteomyelitis, right (HCC) [M86 9]    Operative Findings:  Consistent with diagnosis: right 2nd toe softened in texture and appearance consistent with osteomyelitis  Metatarsal head proximal to amputation site hard in texture and appearance  Procedure and Technique:  Under mild sedation, the patient was brought into the operating room and placed on the operating room table in the supine position  A pneumatic ankle tourniquet was then placed around the patient's right ankle with ample webril padding  A time out was performed to confirm the correct patient, procedure and site with all parties in agreement  Following IV sedation, local anesthetic was injected into the right foot using 10cc 1:1 mix of 1% lidocaine plain and 0 25% marcaine plain  The foot was then scrubbed, prepped and draped in the usual aseptic manner  An esmarch bandage was utilized to exsangunate the patients foot and the pneumatic ankle tourniquet was then inflated  The esmarch bandage was removed and the foot was placed on the operating room table      A skin marker was utilized to plan the incision site about the right 2nd digit in racquet-shaped fashion  A sterile 15 blade was utilized to make a full-thickness incision, down to bone  The right 2nd digit was dissected out at the level of the MTPJ and handed off the field to be sent to gross pathology  The digit was noted to be softened in texture and appearance  The head of the 2nd metatarsal, proximal to the amputation site, was noted to be hard and viable  The region was flushed with normal saline and primary skin closure achieved using nylon  The incision site was dressed with xeroform, DSD, ACE  The tourniquet was deflated and normal hyperemic flush noted to foot  The patient tolerated the procedure well and was transported to PACU with all vital signs stable       Patient Disposition:  PACU     SIGNATURE: Jai Sweet DPM  DATE: January 18, 2018  TIME: 1:12 PM

## 2018-01-18 NOTE — CONSULTS
Inpatient Medical Consultation - [facility] Internal Medicine    Patient Information: Anthony Chavez  47 y o  male MRN: [id number]  [facility] [address] [facility] Encounter: [id number]  PCP: Joaquim Jameson MD  Date of Admission:  1/17/2018  Date of Consultation: 01/18/18  Requesting Physician: Jackson Lindquist DPM    Reason For Consultation:   Preoperative clearance    Assessment/Plan:  #1 second toe dorsal ulceration/osteomyelitis  Continue with current antibiotic regimen due to patient's history of MSSA  Consider discontinuing antibiotics after amputation which should be curative  #2 DM Type II:    Begin no concentrated carbohydrate diet  Cover with Low dose Aspart SSI as needed   Will order HgbA1C to assess status of recent glycemic control  Well initiate home medications once Medication Reconciliation is completed and confirmed by pharmacy  #3History of HTN:       We will continue patient home medications  Although initially his blood pressure was higher in emergency department, it later stabilized  Well also initiate IV hydralazine and IV metoprolol for SBP greater than 1 60 mmHg  We will advise patient to follow-up with next PCP in regards to further better management of ongoing HTN  #4 chronic kidney disease due to diabetic nephropathy  Medication regimen reviewed  Continue current management  Close monitoring Delfina López status with daily labs preoperatively      #5 coronary artery disease  Currently asymptomatic  EKG reviewed normal sinus rhythm  History of AICD placement due to low EF  History of MI  Continue current management including statins and aspirin    Preoperative risk stratification:  Given his age and current comorbidities patient has risk stratified as moderate to high risk for noncardiac surgery, his had multiple previous surgeries under general anesthesia and reports no  Complications from them      Continue current DVT prophylaxis/perioperative incentive spirometry/early ambulation PT/OT  VTE Prophylaxis: Heparin  / sequential compression device     Recommendations for Discharge:      Counseling / Coordination of Care Time: 45 minutes  Greater than 50% of total time spent on patient counseling and coordination of care  Collaboration of Care: Were Recommendations Directly Discussed with Primary Treatment Team? - Yes     History of Present Illness:    Patient is a pleasant 19-year-old male with past medical history significant for CAD, hypertension, diabetes mellitus with peripheral neuropathy and history of MI with ICD placement  He presents today for concern over worsening right 2nd toe infection with wound  Patient states that he was recently on Keflex and finished his last dose on Friday and states that he was feeling better while on the antibiotics but since this has been completed he has noticed increased drainage and night sweats  He states that he has had this right 2nd toe wound for approximately 3 months and has been doing local wound care with visiting nurses and frequent follow-up visits with Dr Pastor Rodriguez, his podiatrist   He does have history of recurrent ulcerations and history of amputation, most notably to left foot transmetatarsal amputation and right foot partial 1st ray amputation  Patient has no feeling to the distal the bilaterally  He currently wears diabetic shoe with a filler to the left and a surgical shoe to the right  He states that within the last week he has noticed night sweats and chills  He denies any nausea, vomiting, fever, constipation, diarrhea, blurry vision, headaches, abdominal pain or lower extremity weakness       Of note, patient states that he recently started seeing a nephrologist for further workup of protein urea and microscopic blood within the urine  He currently states that he has no issues urinating at present time  He has follow-up scheduled with his nephrologist at [facility]     Review of Systems:    Review of Systems    Past Medical and Surgical History:     Past Medical History:   Diagnosis Date    CAD in native artery     Depression     Diabetes mellitus ([facility] [address] )     History of implantable cardioverter-defibrillator (ICD) placement     [employer]    Hypertension     Myocardial infarct, old 2006    in setting of cocaine use   Peripheral neuropathy        Past Surgical History:   Procedure Laterality Date    APPENDECTOMY      CARDIAC DEFIBRILLATOR PLACEMENT  2006    MASTECTOMY FOR GYNECOMASTIA  09/2015    TOE AMPUTATION      Hallux amputation    WOUND DEBRIDEMENT Left 6/16/2016    Procedure: DEBRIDEMENT FOOT/TOE [facility] OUT); Transmetatasral vs Lisfranc amputation , bone biopsy,;  Surgeon: Bethany Patterson DPM;  Location: [facility] OR;  Service:        Meds/Allergies:    all medications and allergies reviewed    Allergies: Allergies   Allergen Reactions    Penicillins        Social History:     Marital Status: Single    Substance Use History:   History   Alcohol Use    Yes     Comment: social-every few months     History   Smoking Status    Former Smoker    Packs/day: 1 00    Years: 18 00    Types: Cigarettes    Start date: 80    Quit date: 2006   Smokeless Tobacco    Never Used     History   Drug Use    Frequency: 1 0 time per week    Types: Marijuana       Family History:    non-contributory    Physical Exam:     Vitals:   Blood Pressure: 150/82 (01/18/18 0709)  Pulse: 63 (01/18/18 0709)  Temperature: 97 8 °F (36 6 °C) (01/18/18 0709)  Temp Source: Temporal (01/18/18 0709)  Respirations: 18 (01/18/18 0709)  Weight - Scale: 124 kg (274 lb 0 5 oz) (01/17/18 0843)  SpO2: 98 % (01/18/18 0709)    Physical Exam  GENERAL APPEARANCE: WD/WN in NAD pleasant  SKIN: no rash  HEENT: NC/AT, PERRLA (B), moist MM, no epistaxis  NECK: Supple, no JVD    LUNGS: CTA (B) mildly prolonged expiratory phase,   no use of accessory muscles    HEART:          S1S 2, RRR  , PMI is not displaced  ABDOMEN: Soft, nontender, nondistended, +BS  Rectal exam:  EXTREMITIES: There is a full-thickness ulceration to the dorsal right 2nd toe that measures 1 4 cm by 0 5 cm at greatest dimension  This probes 0 6 cm proximally and probes to soft cortices of phalanges which are exposed through the wound  There is purulence exuding from wound which was cultured  Wound is fibrotic in nature  Mild malodor  There are hyperkeratotic lesions to the right partial 1st ray amputation site which is healed in its entirety  No other wounds or preulcerative lesions noted  PERIPHERAL VASCULAR: palpable pulses   NEURO:  AAO x 3, CN 2-12: non focal  MUSCLE STRENGHT: 5/5 (B), SENSATION: nonfocal  DTR: ++, CEREBELLAR: non focal  Additional Data:     Lab Results: I have personally reviewed pertinent reports  Results from last 7 days  Lab Units 01/17/18  1936   WBC Thousand/uL 9 98   HEMOGLOBIN g/dL 10 6*   HEMATOCRIT % 30 6*   PLATELETS Thousands/uL 174   NEUTROS PCT % 54   LYMPHS PCT % 32   MONOS PCT % 11   EOS PCT % 2       Results from last 7 days  Lab Units 01/17/18  0955   SODIUM mmol/L 139   POTASSIUM mmol/L 3 4*   CHLORIDE mmol/L 104   CO2 mmol/L 26   BUN mg/dL 19   CREATININE mg/dL 2 18*   CALCIUM mg/dL 8 3   TOTAL PROTEIN g/dL 8 1   BILIRUBIN TOTAL mg/dL 0 34   ALK PHOS U/L 62   ALT U/L 8*   AST U/L 19   GLUCOSE RANDOM mg/dL 179*           Imaging: I have personally reviewed pertinent reports  Xr Chest Pa & Lateral    Result Date: 1/17/2018  Narrative: CHEST INDICATION:  Preoperative clearance  COMPARISON:  June 13, 2016 VIEWS:  Frontal and lateral projections IMAGES:  2 FINDINGS:     The heart is enlarged  Atherosclerotic changes in the aorta  Single lead pacing device  Lung markings are crowded secondary to low lung volumes  Within limitations of this examination there is no focal airspace opacity to suggest pneumonia  No pneumothorax or pleural effusion  Age-appropriate degenerative changes are noted in the spine    Osseous structures appear otherwise within normal limits  Impression: No active pulmonary disease on examination which is somewhat limited secondary to low lung volumes  Workstation performed: [id number]     Xr Foot 3+ Vw Right    Result Date: 1/17/2018  Narrative: RIGHT FOOT INDICATION:  Right 2nd toe ulceration with concern for ostial myelitis  Diabetes with peripheral uropathy  COMPARISON: Right ankle 7/12/2016 VIEWS:  3 IMAGES:  3 FINDINGS: There is bony irregularity of the 2nd digit at the head of the proximal phalanx/PIP joint  Difficult to determine if this is post traumatic in nature or potentially related to osteomyelitis given flexion of the distal digit  There has been transmetatarsal resection of the 1st digit  There is collapse and degenerative changes of the midfoot consistent with Charcot arthropathy  Plantar calcaneal spur  No lytic or blastic lesions are seen  Vascular calcifications  No radiopaque foreign bodies  Impression: Bony irregularity 2nd digit at the head of the proximal phalanx/PIP joint  Difficult to determine with certainty if this is post traumatic in nature or potentially related to osteomyelitis given flexion of the distal digit  I slightly favor osteomyelitis  If relevant cone-down imaging of the 2nd digit (flattening the digit to eliminate flexion) may be helpful  Follow-up is advised  Additional chronic findings such as Charcot arthropathy, changes from 1st digit surgery and plantar calcaneal spur  Workstation performed: [id number]     Vas Lower Limb Venous Duplex Study, Unilateral/limited    Result Date: 1/17/2018  Narrative:  [facility] REPORT CLINICAL: Indications: Patient presents with right calf pain with right 2nd digit ulceration  Risk Factors: The patient has history of obesity  Clinical: Right Lower Limb There is complaint of pain      FINDINGS:  Segment  Right            Left              Impression       Impression       CFV      Normal (Patent)  Normal (Patent) CONCLUSION: Impression: RIGHT LOWER LIMB: NORMAL No evidence of acute or chronic deep vein thrombosis   No evidence of superficial thrombophlebitis noted  Doppler evaluation shows a normal response to augmentation maneuvers  Popliteal, posterior tibial and anterior tibial arterial Doppler waveforms are triphasic/biphasic/hyperemic  LEFT LOWER LIMB LIMITED: NORMAL Evaluation shows no evidence of thrombus in the common femoral vein  Doppler evaluation shows a normal response to augmentation maneuvers  Technical findings were given to David Pratt RN  AB  SIGNATURE: Electronically Signed by: Sharri Bolanos MD on 2018-01-17 04:47:41 PM      EKG, Pathology, and Other Studies Reviewed on Admission:   · EKG: normal sinus no ST elevation, nonspecific T-wave changes    ** Please Note: This note has been constructed using a voice recognition system   **

## 2018-01-18 NOTE — PROGRESS NOTES
ADDENDUM Report By Dr Afsaneh Loya:    I have thoroughly reviewed a history and physical examination of the patient   I reviewed the notes by resident's, I seen and examined the patient and admission orders   Also discussed treatment plan with the patient/family and very agreeable to continue our current plan of treatment  At this time patient is risk stratified as moderate to high risk for noncardiac surgery  No new recommendations for further evaluation is made at this time  Please see my consultation notes for details  Sincerely,         Dr Sagar Mohan

## 2018-01-19 VITALS
HEART RATE: 70 BPM | WEIGHT: 274.03 LBS | DIASTOLIC BLOOD PRESSURE: 82 MMHG | SYSTOLIC BLOOD PRESSURE: 173 MMHG | BODY MASS INDEX: 36.15 KG/M2 | RESPIRATION RATE: 18 BRPM | TEMPERATURE: 98 F | OXYGEN SATURATION: 98 %

## 2018-01-19 LAB
BACTERIA WND AEROBE CULT: ABNORMAL
GLUCOSE SERPL-MCNC: 108 MG/DL (ref 65–140)
GLUCOSE SERPL-MCNC: 112 MG/DL (ref 65–140)
GRAM STN SPEC: ABNORMAL

## 2018-01-19 PROCEDURE — G8979 MOBILITY GOAL STATUS: HCPCS

## 2018-01-19 PROCEDURE — 97163 PT EVAL HIGH COMPLEX 45 MIN: CPT

## 2018-01-19 PROCEDURE — G8980 MOBILITY D/C STATUS: HCPCS

## 2018-01-19 PROCEDURE — G8978 MOBILITY CURRENT STATUS: HCPCS

## 2018-01-19 PROCEDURE — 82948 REAGENT STRIP/BLOOD GLUCOSE: CPT

## 2018-01-19 RX ORDER — DOXYCYCLINE HYCLATE 100 MG/1
100 CAPSULE ORAL EVERY 12 HOURS SCHEDULED
Qty: 20 CAPSULE | Refills: 0 | Status: SHIPPED | OUTPATIENT
Start: 2018-01-19 | End: 2018-01-29

## 2018-01-19 RX ADMIN — METOPROLOL SUCCINATE 25 MG: 25 TABLET, EXTENDED RELEASE ORAL at 10:00

## 2018-01-19 RX ADMIN — ASPIRIN 81 MG 81 MG: 81 TABLET ORAL at 10:00

## 2018-01-19 RX ADMIN — NIFEDIPINE 30 MG: 30 TABLET, FILM COATED, EXTENDED RELEASE ORAL at 10:00

## 2018-01-19 RX ADMIN — METRONIDAZOLE 500 MG: 500 TABLET ORAL at 05:40

## 2018-01-19 RX ADMIN — SODIUM CHLORIDE 75 ML/HR: 0.9 INJECTION, SOLUTION INTRAVENOUS at 00:13

## 2018-01-19 RX ADMIN — CEFAZOLIN SODIUM 1000 MG: 1 SOLUTION INTRAVENOUS at 00:11

## 2018-01-19 NOTE — PLAN OF CARE
Problem: Potential for Falls  Goal: Patient will remain free of falls  INTERVENTIONS:  - Assess patient frequently for physical needs  -  Identify cognitive and physical deficits and behaviors that affect risk of falls    -  [place] fall precautions as indicated by assessment   - Educate patient/family on patient safety including physical limitations  - Instruct patient to call for assistance with activity based on assessment  - Modify environment to reduce risk of injury  - Consider OT/PT consult to assist with strengthening/mobility   Outcome: Progressing      Problem: PAIN - ADULT  Goal: Verbalizes/displays adequate comfort level or baseline comfort level  Interventions:  - Encourage patient to monitor pain and request assistance  - Assess pain using appropriate pain scale  - Administer analgesics based on type and severity of pain and evaluate response  - Implement non-pharmacological measures as appropriate and evaluate response  - Consider cultural and social influences on pain and pain management  - Notify physician/advanced practitioner if interventions unsuccessful or patient reports new pain   Outcome: Progressing      Problem: SKIN/TISSUE INTEGRITY - ADULT  Goal: Skin integrity remains intact  INTERVENTIONS  - Identify patients at risk for skin breakdown  - Assess and monitor skin integrity  - Assess and monitor nutrition and hydration status  - Monitor labs (i e  albumin)  - Assess for incontinence   - Turn and reposition patient  - Assist with mobility/ambulation  - Relieve pressure over bony prominences  - Avoid friction and shearing  - Provide appropriate hygiene as needed including keeping skin clean and dry  - Evaluate need for skin moisturizer/barrier cream  - Collaborate with interdisciplinary team (i e  Nutrition, Rehabilitation, etc )   - Patient/family teaching   Outcome: Progressing    Goal: Incision(s), wounds(s) or drain site(s) healing without S/S of infection  INTERVENTIONS  - Assess and document risk factors for skin impairment   - Assess and document dressing, incision, wound bed, drain sites and surrounding tissue  - Initiate Nutrition services consult and/or wound management as needed   Outcome: Progressing

## 2018-01-19 NOTE — PROGRESS NOTES
Progress Note - Podiatry  Radha Howard  47 y o  male MRN: [id number]  Unit/Bed#: [place] [facility] 2 -01 Encounter: [id number]    Assessment/Plan:  3 44-year-old male with a right 2nd toe dorsal ulceration with concern for underlying osteomyelitis secondary to diabetes mellitus with peripheral neuropathy, now s/p right 2nd digit amputation (DOS: 1/18/18) by Dr Andrew Darby   -afebrile, AM labs pending, nontoxic in appearance   -wound culture: 4+ staph aureus   -patient's IV has malfunctioned and he is refusing any further IV antibiotics or bloodwork  -post-operative radiographs consistent with s/p amputation of the 2nd toe   -PWB to heel RLE with use of surgical shoe   -ESR: 118, CRP: 47, A1c: 6 7   -venous duplex with no evidence of DVT   -plan for discharge on 10 day course of doxycycline      2   Diabetes mellitus, type 2  -c/w SSI while in-house   -A1c: 6 7      3   Hypertension  -continue metoprolol and nifedipine     4   Coronary artery disease  -EKG with normal sinus rhythm   -continue aspirin and statin  -history of ICD placement  -history of MI in 2006     5   DVT prophylaxis  -heparin  -venous duplex negative      6   Code status:  Level 1 full code discussed with patient     Disposition: Pending PT evaluation    Subjective/Objective   Chief Complaint:   Chief Complaint   Patient presents with    Toe Pain     Reports to come to ED for ambutation of second digit on R foot; surgeon told him to come to ED  Subjective: 47 y o  y/o male was seen and evaluated at bedside  Denies constitutional symptoms  Reports that he is adamant about going home and he has a great anxiety of hospitals  Blood pressure (!) 173/82, pulse 70, temperature 98 °F (36 7 °C), temperature source Tympanic, resp  rate 18, weight 124 kg (274 lb 0 5 oz), SpO2 98 %  ,Body mass index is 36 15 kg/m²      Invasive Devices          No matching active lines, drains, or airways        Physical Exam:   General: Alert, cooperative and no distress  Lungs: Non labored breathing  Abdomen: Soft, non-tender    Extremity: RLE 2nd digit amputation site with all sutures intact, evidence of sanguinous drainage only upon dressing change, small region of granular tissue  through suture site, no acute signs of infection, no purulence, no underlying fluctuance to indicate hematoma       RLE 1/19/18     Lab, Imaging and other studies:   CBC:   Lab Results   Component Value Date    WBC 7 16 01/18/2018    HGB 11 5 (L) 01/18/2018    HCT 32 8 (L) 01/18/2018    MCV 94 01/18/2018     01/18/2018    MCH 33 0 01/18/2018    MCHC 35 1 01/18/2018    RDW 12 5 01/18/2018    MPV 10 3 01/18/2018     VTE Pharmacologic Prophylaxis: Heparin, ASA

## 2018-01-19 NOTE — PHYSICAL THERAPY NOTE
PT EVALUATION    Pt  Name: Jeanine Alejandro   Pt  Age: 47 y o  Patient Active Problem List   Diagnosis    Cellulitis of foot, left    Dehiscence of surgical wound    Acute osteomyelitis of metatarsal bone of left foot (HCC)    Peripheral neuropathy    Myocardial infarct, old    Hypertension    History of implantable cardioverter-defibrillator (ICD) placement    Type 2 diabetes mellitus with hyperlipidemia ([facility] [address] )    CAD in native artery    Acute right ankle pain    Hypokalemia    Anemia    Hypovitaminosis D    Toe osteomyelitis, right (HCC)       Toe pain [M79 676]  Essential hypertension [I10]  Myocardial infarct, old [I25 2]  Toe osteomyelitis, right (HCC) [M86 9]  CAD in native artery [I25 10]  History of implantable cardioverter-defibrillator (ICD) placement [Y04 979]    Past Medical History:   Diagnosis Date    CAD in native artery     Depression     Diabetes mellitus ([facility] [address] )     History of implantable cardioverter-defibrillator (ICD) placement     Medtronic    Hypertension     Myocardial infarct, old 2006    in setting of cocaine use   Peripheral neuropathy        Past Surgical History:   Procedure Laterality Date    APPENDECTOMY      CARDIAC DEFIBRILLATOR PLACEMENT  2006    MASTECTOMY FOR GYNECOMASTIA  09/2015    TOE AMPUTATION      Hallux amputation    TOE AMPUTATION Right 1/18/2018    Procedure: AMPUTATION 2ND TOE;  Surgeon: Juan Alberto Blankenship DPM;  [facility];  Service: Podiatry    WOUND DEBRIDEMENT Left 6/16/2016    Procedure: DEBRIDEMENT FOOT/TOE [facility]);  Transmetatasral vs Lisfranc amputation , bone biopsy,;  Surgeon: Juan Alberto Blankenship DPM;  [facility];  Service:         01/19/18 1100   Note Type   Note type Eval only   Pain Assessment   Pain Score No Pain   Home Living   Type of [address] [facility] One level;Stairs to enter with rails  (18STE w/ B/L railings)   Home Equipment Walker;Cane;Wheelchair-manual   Prior Function   Level of [place] Independent with ADLs and functional mobility   Lives With Alone   Receives Help From Home health   ADL Assistance Independent   Falls in the last 6 months 0   Restrictions/Precautions   Weight Bearing Precautions Per Order Yes   RLE Weight Bearing Per Order PWB  (to heel w/ surgical shoe)   Other Precautions WBS   General   Family/Caregiver Present No   Cognition   Overall Cognitive Status WFL   Arousal/Participation Alert   Orientation Level Oriented X4   Following Commands Follows all commands and directions without difficulty   RUE Assessment   RUE Assessment WNL   RUE Strength   RUE Overall Strength Within Functional Limits - strength 5/5   LUE Assessment   LUE Assessment WNL   LUE Strength   LUE Overall Strength Within Functional Limits - strength 5/5   RLE Assessment   RLE Assessment WNL   Strength RLE   RLE Overall Strength 4+/5   LLE Assessment   LLE Assessment WNL   Strength LLE   LLE Overall Strength 5/5   Coordination   Movements are Fluid and Coordinated 1   Sensation WFL   Bed Mobility   Supine to Sit 7  Independent   Sit to Supine 7  Independent   Transfers   Sit to Stand 7  Independent   Stand to Sit 7  Independent   Ambulation/Elevation   Gait pattern Antalgic  (PWB to R heel )   Gait Assistance 7  Independent   Assistive Device None   Distance 200'x1   Stair Management Assistance 6  Modified independent   Stair Management Technique One rail R;Alternating pattern; Foreward;Reciprocal   Number of Stairs 7   Balance   Static Sitting Normal   Static Standing Normal   Ambulatory Good   Endurance Deficit   Endurance Deficit No   Activity Tolerance   Activity Tolerance Patient tolerated treatment well   Nurse Made Aware justin   Assessment   Prognosis Excellent   Problem List Decreased skin integrity;Orthopedic restrictions   Assessment Pt 47 y o  male s/p R 2nd toe amputation on 1/18/18 2* to osteomyelitis  PWB to R heel w/ surgical shoe per Podiatry  PTA, pt reports being I   Pt referred to PT for mobility assessment & D/C planning  Pt demonstrates no significant decline in function to warrant skilled PT at this time  Pt  I w/ overall functional mobility including amb  Able to maintain PWB to R heel  Gait antalgic but steady w/o AD  Balance intact  Pt able to negotiate stairs w/o difficulty, steady  Pt states being at his functional baseline and states no concerns about going back to his previous environment  Will D/C PT  Pt may return home when medically cleared  Please advise PT when needs change  Nsg notified      Barriers to Discharge None   Goals   Patient Goals go home ASAP   Treatment Day 0   Plan   Treatment/Interventions Spoke to nursing;Spoke to advanced practitioner  (PT eval only)   PT Frequency Other (Comment)  (D/C PT)   Recommendation   Recommendation Home independently   Equipment Recommended (pt has DME's at home)   PT - OK to Discharge Yes   Barthel Index   Feeding 10   Bathing 5   Grooming Score 5   Dressing Score 10   Bladder Score 10   Bowels Score 10   Toilet Use Score 10   Transfers (Bed/Chair) Score 15   Mobility (Level Surface) Score 15   Stairs Score 10   Barthel Index Score 100   Hx/personal factors: co-morbidities; fall risk; inaccessible home; dec caregiver; WB restriction; AD use PRN  Examination: assessed body system, balance, endurance, amb, WB compliance, pain, fall risk & D/C rec  Clinical: unpredictable (ongoing medical status; abnormal lab values; fall risk)  Complexity: high    Bradley Silva, PT

## 2018-01-19 NOTE — DISCHARGE SUMMARY
Discharge Summary - Gopi Wilkes  47 y o  male MRN: [id number]    Unit/Bed#: [facility] 2 -01 Encounter: [id number]    Admission Date: 1/17/2018     Admitting Diagnosis: Toe pain [M79 676]  Essential hypertension [I10]  Myocardial infarct, old [I25 2]  Toe osteomyelitis, right (HCC) [M86 9]  CAD in native artery [I25 10]  History of implantable cardioverter-defibrillator (ICD) placement [Z95 810]    HPI: Patient is a 50-year-old male with past medical history significant for CAD, hypertension, diabetes mellitus with peripheral neuropathy and history of MI with ICD placement  He presented to the ED on 1/17/18 for concern over worsening right 2nd toe infection with wound  Patient had recently been on Keflex and finished his last dose on Friday, prior to coming in  While on the antibiotics he reported feeling better, however since completing the course, he reported feeling worse  He reports that he had his right 2nd toe wound for approximately 3 months and had been doing local wound care with visiting nurses and frequent follow-up visits with Dr Db Vázquez, his podiatrist  He does have history of recurrent ulcerations and history of amputation, most notably to left foot transmetatarsal amputation and right foot partial 1st ray amputation  Patient has decreased sensation to B/L LE  He currently wears diabetic shoe with a filler to the left and a surgical shoe to the right  He denied any nausea, vomiting, fever, constipation, diarrhea, blurry vision, headaches, abdominal pain or lower extremity weakness however did report fever and chills prior to admission into hospital      Procedures Performed: AMPUTATION 2ND TOE:     Hospital Course: Patient admitted to the [address] [place] [address] on 1/17/18 due to right 2nd worsening wound and infection  Patient admitted under the podiatry service   Radiographs taken upon admission reveal bony irregularity of the 2nd digit at the head of the proximal phalanx/PIPJ with difficulty in determining post-traumatic in nature vs osteomyelitis  Patient with elevated ESR, CRP, and HbA1c upon admission  Venous duplex ordered to rule out DVT as patient was also complaining of leg pain  Duplex negative for DVT  Patient was scheduled for right 2nd digit amputation and underwent such on 1/18/18 by Dr Busby Guajardo  Patient evaluated post-op 1/19/18 and incision site is without acute signs of infection  Wound cultures have grown 4+ staph aureus preliminary  Patient will be discharged on 10 day oral course of doxycycline  He already has visiting nurses come to his house and is to continue with such for dressing changes qod with xeroform, DSD, ACE to incision site  He is to continue with partial weight-bearing to the right heel with use of surgical shoe  Patient advised to have someone pick him up however he is adamantly refusing and insists on driving home  PT to see and evaluate patient prior to discharge  Significant Findings, Care, Treatment and Services Provided: As listed above     Complications: None     Discharge Diagnosis: Stable     Condition at Discharge: stable     Discharge instructions/Information to patient and family:   See after visit summary for information provided to patient and family  Provisions for Follow-Up Care/Important appointments:  Provided   See after visit summary for information related to follow-up care and any pertinent home health orders  Disposition: Home    Planned Readmission: No    Discharge Statement   I spent 30 minutes discharging the patient  This time was spent on the day of discharge  I had direct contact with the patient on the day of discharge  The details of this patient's discharge     Discharge Medications:  See after visit summary for reconciled discharge medications provided to patient and family

## 2018-06-09 ENCOUNTER — HOSPITAL ENCOUNTER (EMERGENCY)
Facility: HOSPITAL | Age: 53
Discharge: HOME | End: 2018-06-09
Attending: EMERGENCY MEDICINE | Admitting: EMERGENCY MEDICINE
Payer: COMMERCIAL

## 2018-06-09 VITALS
SYSTOLIC BLOOD PRESSURE: 162 MMHG | RESPIRATION RATE: 20 BRPM | OXYGEN SATURATION: 100 % | TEMPERATURE: 97.4 F | HEART RATE: 68 BPM | DIASTOLIC BLOOD PRESSURE: 81 MMHG

## 2018-06-09 DIAGNOSIS — R12 HEARTBURN: Primary | ICD-10-CM

## 2018-06-09 DIAGNOSIS — R79.89 ELEVATED SERUM CREATININE: ICD-10-CM

## 2018-06-09 LAB
ALBUMIN SERPL BCP-MCNC: 3 G/DL (ref 3.5–5)
ALP SERPL-CCNC: 52 U/L (ref 46–116)
ALT SERPL W P-5'-P-CCNC: 16 U/L (ref 12–78)
ANION GAP SERPL CALCULATED.3IONS-SCNC: 7 MMOL/L (ref 4–13)
AST SERPL W P-5'-P-CCNC: 17 U/L (ref 5–45)
BASOPHILS # BLD AUTO: 0.05 THOUSANDS/ΜL (ref 0–0.1)
BASOPHILS NFR BLD AUTO: 1 % (ref 0–1)
BILIRUB SERPL-MCNC: 0.27 MG/DL (ref 0.2–1)
BUN SERPL-MCNC: 25 MG/DL (ref 5–25)
CALCIUM SERPL-MCNC: 9.4 MG/DL (ref 8.3–10.1)
CHLORIDE SERPL-SCNC: 103 MMOL/L (ref 100–108)
CO2 SERPL-SCNC: 29 MMOL/L (ref 21–32)
CREAT SERPL-MCNC: 3.22 MG/DL (ref 0.6–1.3)
EOSINOPHIL # BLD AUTO: 0.24 THOUSAND/ΜL (ref 0–0.61)
EOSINOPHIL NFR BLD AUTO: 2 % (ref 0–6)
ERYTHROCYTE [DISTWIDTH] IN BLOOD BY AUTOMATED COUNT: 13.8 % (ref 11.6–15.1)
GFR SERPL CREATININE-BSD FRML MDRD: 24 ML/MIN/1.73SQ M
GLUCOSE SERPL-MCNC: 178 MG/DL (ref 65–140)
HCT VFR BLD AUTO: 33.8 % (ref 36.5–49.3)
HGB BLD-MCNC: 11.7 G/DL (ref 12–17)
LIPASE SERPL-CCNC: 226 U/L (ref 73–393)
LYMPHOCYTES # BLD AUTO: 3.56 THOUSANDS/ΜL (ref 0.6–4.47)
LYMPHOCYTES NFR BLD AUTO: 36 % (ref 14–44)
MCH RBC QN AUTO: 32.4 PG (ref 26.8–34.3)
MCHC RBC AUTO-ENTMCNC: 34.6 G/DL (ref 31.4–37.4)
MCV RBC AUTO: 94 FL (ref 82–98)
MONOCYTES # BLD AUTO: 0.97 THOUSAND/ΜL (ref 0.17–1.22)
MONOCYTES NFR BLD AUTO: 10 % (ref 4–12)
NEUTROPHILS # BLD AUTO: 5.15 THOUSANDS/ΜL (ref 1.85–7.62)
NEUTS SEG NFR BLD AUTO: 52 % (ref 43–75)
NRBC BLD AUTO-RTO: 0 /100 WBCS
PLATELET # BLD AUTO: 270 THOUSANDS/UL (ref 149–390)
PMV BLD AUTO: 9.7 FL (ref 8.9–12.7)
POTASSIUM SERPL-SCNC: 3.9 MMOL/L (ref 3.5–5.3)
PROT SERPL-MCNC: 7.8 G/DL (ref 6.4–8.2)
RBC # BLD AUTO: 3.61 MILLION/UL (ref 3.88–5.62)
SODIUM SERPL-SCNC: 139 MMOL/L (ref 136–145)
TROPONIN I SERPL-MCNC: 0.03 NG/ML
WBC # BLD AUTO: 9.97 THOUSAND/UL (ref 4.31–10.16)

## 2018-06-09 PROCEDURE — 93005 ELECTROCARDIOGRAM TRACING: CPT

## 2018-06-09 PROCEDURE — 85025 COMPLETE CBC W/AUTO DIFF WBC: CPT | Performed by: EMERGENCY MEDICINE

## 2018-06-09 PROCEDURE — 99283 EMERGENCY DEPT VISIT LOW MDM: CPT

## 2018-06-09 PROCEDURE — 83690 ASSAY OF LIPASE: CPT | Performed by: EMERGENCY MEDICINE

## 2018-06-09 PROCEDURE — 36415 COLL VENOUS BLD VENIPUNCTURE: CPT | Performed by: EMERGENCY MEDICINE

## 2018-06-09 PROCEDURE — 80053 COMPREHEN METABOLIC PANEL: CPT | Performed by: EMERGENCY MEDICINE

## 2018-06-09 PROCEDURE — 84484 ASSAY OF TROPONIN QUANT: CPT | Performed by: EMERGENCY MEDICINE

## 2018-06-09 RX ORDER — MAGNESIUM HYDROXIDE/ALUMINUM HYDROXICE/SIMETHICONE 120; 1200; 1200 MG/30ML; MG/30ML; MG/30ML
30 SUSPENSION ORAL ONCE
Status: COMPLETED | OUTPATIENT
Start: 2018-06-09 | End: 2018-06-09

## 2018-06-09 RX ORDER — RANITIDINE 150 MG/1
150 CAPSULE ORAL 2 TIMES DAILY
COMMUNITY
End: 2018-10-09 | Stop reason: SDUPTHER

## 2018-06-09 RX ORDER — SUCRALFATE 1 G/1
1 TABLET ORAL
Qty: 56 TABLET | Refills: 0 | Status: SHIPPED | OUTPATIENT
Start: 2018-06-09 | End: 2019-01-24 | Stop reason: SDDI

## 2018-06-09 RX ORDER — SIMETHICONE 80 MG
80 TABLET,CHEWABLE ORAL ONCE
Status: COMPLETED | OUTPATIENT
Start: 2018-06-09 | End: 2018-06-09

## 2018-06-09 RX ORDER — OMEPRAZOLE 20 MG/1
20 CAPSULE, DELAYED RELEASE ORAL DAILY
Qty: 30 CAPSULE | Refills: 0 | Status: SHIPPED | OUTPATIENT
Start: 2018-06-09 | End: 2018-11-06 | Stop reason: SDUPTHER

## 2018-06-09 RX ADMIN — ALUMINUM HYDROXIDE, MAGNESIUM HYDROXIDE, AND SIMETHICONE 30 ML: 200; 200; 20 SUSPENSION ORAL at 21:42

## 2018-06-09 RX ADMIN — LIDOCAINE HYDROCHLORIDE 10 ML: 20 SOLUTION ORAL; TOPICAL at 21:42

## 2018-06-09 RX ADMIN — LIDOCAINE HYDROCHLORIDE 10 ML: 20 SOLUTION ORAL; TOPICAL at 23:16

## 2018-06-09 RX ADMIN — SIMETHICONE CHEW TAB 80 MG 80 MG: 80 TABLET ORAL at 23:16

## 2018-06-10 LAB
ATRIAL RATE: 61 BPM
P AXIS: 56 DEGREES
PR INTERVAL: 196 MS
QRS AXIS: 9 DEGREES
QRSD INTERVAL: 94 MS
QT INTERVAL: 420 MS
QTC INTERVAL: 422 MS
T WAVE AXIS: 28 DEGREES
VENTRICULAR RATE: 61 BPM

## 2018-06-10 PROCEDURE — 93010 ELECTROCARDIOGRAM REPORT: CPT | Performed by: INTERNAL MEDICINE

## 2018-06-10 NOTE — ED PROVIDER NOTES
History  Chief Complaint   Patient presents with    Heartburn     ate buffalo wings thursday, heartburn since, can feel burning in throat  21-year-old male with a history of hypertension, diabetes, coronary artery disease presents to the emergency department with a 3 day history of what he describes as heartburn  He states that this started after eating hot wings  Since then he has been taking Tums and Zantac without relief  He states he has frequent belching his symptoms wax and wane but never quite go away  He has had no shortness of breath  No diaphoresis  History provided by:  Patient   used: No    Heartburn   Location:  Epigastric and upper chest  Severity:  Unable to specify  Onset quality:  Gradual  Duration:  3 days  Timing:  Constant  Progression:  Waxing and waning  Chronicity:  New  Context:  After eating hot wings  Ineffective treatments:  Tums  Associated symptoms: nausea    Associated symptoms: no abdominal pain, no chest pain, no congestion, no cough, no diarrhea, no ear pain, no fatigue, no fever, no headaches, no myalgias, no rash, no rhinorrhea, no shortness of breath and no vomiting        Prior to Admission Medications   Prescriptions Last Dose Informant Patient Reported? Taking?    Empagliflozin (JARDIANCE) 25 MG TABS   Yes No   Sig: Take 25 mg by mouth every morning   NIFEdipine ER (ADALAT CC) 30 MG 24 hr tablet   Yes No   Sig: Take 30 mg by mouth daily   aspirin 81 MG tablet   Yes No   Sig: Take 81 mg by mouth daily   metoprolol succinate (TOPROL-XL) 25 mg 24 hr tablet   Yes No   Sig: Take 25 mg by mouth daily   ranitidine (ZANTAC) 150 MG capsule   Yes Yes   Sig: Take 150 mg by mouth 2 (two) times a day   simvastatin (ZOCOR) 10 mg tablet   Yes No   Sig: Take 10 mg by mouth daily at bedtime   sitaGLIPtin (JANUVIA) 50 mg tablet   Yes No   Sig: Take 50 mg by mouth daily      Facility-Administered Medications: None       Past Medical History:   Diagnosis Date  CAD in native artery     Depression     Diabetes mellitus ([facility] [address] )     History of implantable cardioverter-defibrillator (ICD) placement     Medtronic    Hypertension     Myocardial infarct, old 2006    in setting of cocaine use   Peripheral neuropathy        Past Surgical History:   Procedure Laterality Date    APPENDECTOMY      CARDIAC DEFIBRILLATOR PLACEMENT  2006    MASTECTOMY FOR GYNECOMASTIA  09/2015    TOE AMPUTATION      Hallux amputation    TOE AMPUTATION Right 1/18/2018    Procedure: AMPUTATION 2ND TOE;  Surgeon: Alexander Soares DPM;  [facility];  Service: Podiatry    WOUND DEBRIDEMENT Left 6/16/2016    Procedure: DEBRIDEMENT FOOT/TOE [facility]); Transmetatasral vs Lisfranc amputation , bone biopsy,;  Surgeon: Alexander Soares DPM;  [facility];  Service:        History reviewed  No pertinent family history  I have reviewed and agree with the history as documented  Social History   Substance Use Topics    Smoking status: Former Smoker     Packs/day: 1 00     Years: 18 00     Types: Cigarettes     Start date: 1988     Quit date: 2006    Smokeless tobacco: Never Used    Alcohol use Yes      Comment: social-every few months        Review of Systems   Constitutional: Negative  Negative for fatigue and fever  HENT: Negative  Negative for congestion, ear pain and rhinorrhea  Eyes: Negative  Respiratory: Negative  Negative for cough and shortness of breath  Cardiovascular: Negative  Negative for chest pain  Gastrointestinal: Positive for nausea  Negative for abdominal pain, diarrhea and vomiting  Genitourinary: Negative  Musculoskeletal: Negative for myalgias and neck pain  Skin: Negative  Negative for rash  Allergic/Immunologic: Negative  Neurological: Negative  Negative for weakness, numbness and headaches  Hematological: Negative  Psychiatric/Behavioral: Negative  All other systems reviewed and are negative        Physical Exam  Physical Exam   Constitutional: He is oriented to person, place, and time  He appears well-developed and well-nourished  Non-toxic appearance  He does not have a sickly appearance  He does not appear ill  No distress  HENT:   Head: Normocephalic and atraumatic  Right Ear: External ear normal    Left Ear: External ear normal    Mouth/Throat: Oropharynx is clear and moist    Eyes: Conjunctivae are normal  Pupils are equal, round, and reactive to light  No scleral icterus  Cardiovascular: Normal rate, regular rhythm and normal heart sounds  Pulmonary/Chest: Effort normal and breath sounds normal    Abdominal: Soft  Bowel sounds are normal  He exhibits no distension and no mass  There is no tenderness  No hernia  Musculoskeletal: Normal range of motion  He exhibits no edema, tenderness or deformity  Neurological: He is alert and oriented to person, place, and time  He has normal strength and normal reflexes  He exhibits normal muscle tone  Skin: Skin is warm and dry  No rash noted  He is not diaphoretic  No erythema  No pallor  Psychiatric: He has a normal mood and affect  Nursing note and vitals reviewed        Vital Signs  ED Triage Vitals   Temperature Pulse Respirations Blood Pressure SpO2   06/09/18 2040 06/09/18 2040 06/09/18 2040 06/09/18 2042 06/09/18 2040   (!) 97 4 °F (36 3 °C) 75 18 128/83 96 %      Temp src Heart Rate Source Patient Position - Orthostatic VS BP Location FiO2 (%)   -- 06/09/18 2040 06/09/18 2040 06/09/18 2040 --    Monitor Sitting Right arm       Pain Score       06/09/18 2040       4           Vitals:    06/09/18 2040 06/09/18 2042   BP:  128/83   Pulse: 75    Patient Position - Orthostatic VS: Sitting        Visual Acuity      ED Medications  Medications   aluminum-magnesium hydroxide-simethicone (MYLANTA) 200-200-20 mg/5 mL oral suspension 30 mL (30 mL Oral Given 6/9/18 2142)   lidocaine viscous (XYLOCAINE) 2 % mucosal solution 10 mL (10 mL Swish & Swallow Given 6/9/18 2142)       Diagnostic Studies  Results Reviewed     Procedure Component Value Units Date/Time    Troponin I [[id number]]  (Normal) Collected:  06/09/18 2152    Lab Status:  Final result Specimen:  Blood from Arm, Right Updated:  06/09/18 2219     Troponin I 0 03 ng/mL     Narrative:         [employer] Chemistry analyzer 99% cutoff is > 0 04 ng/mL in network labs    o cTnI 99% cutoff is useful only when applied to patients in the clinical setting of myocardial ischemia  o cTnI 99% cutoff should be interpreted in the context of clinical history, ECG findings and possibly cardiac imaging to establish correct diagnosis  o cTnI 99% cutoff may be suggestive but clearly not indicative of a coronary event without the clinical setting of myocardial ischemia  Comprehensive metabolic panel [[id number]]  (Abnormal) Collected:  06/09/18 2152    Lab Status:  Final result Specimen:  Blood from Arm, Right Updated:  06/09/18 2217     Sodium 139 mmol/L      Potassium 3 9 mmol/L      Chloride 103 mmol/L      CO2 29 mmol/L      Anion Gap 7 mmol/L      BUN 25 mg/dL      Creatinine 3 22 (H) mg/dL      Glucose 178 (H) mg/dL      Calcium 9 4 mg/dL      AST 17 U/L      ALT 16 U/L      Alkaline Phosphatase 52 U/L      Total Protein 7 8 g/dL      Albumin 3 0 (L) g/dL      Total Bilirubin 0 27 mg/dL      eGFR 24 ml/min/1 73sq m     Narrative:         National Kidney Disease Education Program recommendations are as follows:  GFR calculation is accurate only with a steady state creatinine  Chronic Kidney disease less than 60 ml/min/1 73 sq  meters  Kidney failure less than 15 ml/min/1 73 sq  meters      Lipase [[id number]]  (Normal) Collected:  06/09/18 2152    Lab Status:  Final result Specimen:  Blood from Arm, Right Updated:  06/09/18 2217     Lipase 226 u/L     CBC and differential [[id number]]  (Abnormal) Collected:  06/09/18 2152    Lab Status:  Final result Specimen:  Blood from Arm, Right Updated:  06/09/18 2202     WBC 9 97 Thousand/uL RBC 3 61 (L) Million/uL      Hemoglobin 11 7 (L) g/dL      Hematocrit 33 8 (L) %      MCV 94 fL      MCH 32 4 pg      MCHC 34 6 g/dL      RDW 13 8 %      MPV 9 7 fL      Platelets 683 Thousands/uL      nRBC 0 /100 WBCs      Neutrophils Relative 52 %      Lymphocytes Relative 36 %      Monocytes Relative 10 %      Eosinophils Relative 2 %      Basophils Relative 1 %      Neutrophils Absolute 5 15 Thousands/µL      Lymphocytes Absolute 3 56 Thousands/µL      Monocytes Absolute 0 97 Thousand/µL      Eosinophils Absolute 0 24 Thousand/µL      Basophils Absolute 0 05 Thousands/µL                  No orders to display              Procedures  Procedures       Phone Contacts  ED Phone Contact    ED Course  ED Course as of Jun 09 2258   Sat Jun 09, 2018 2254 Patient  feeling much better  He was updated regarding his results specifically his creatinine  This is up from his baseline of 1 8  I did suggest admission for further workup, however patient does not want to be admitted he states he will follow up with his family physician  He also states that he has a history of gallstones  Re-examination of the abdomen is now completely nontender  Patient is stable for discharge                                MDM  Number of Diagnoses or Management Options  Diagnosis management comments: 48-year-old male presents with a 3 day history of heartburn  States this started after eating hot wings 3 days ago  He has been taking Tums without relief  The symptoms get better but never quite go away  He does have frequent belching  No diaphoresis or shortness of breath  On exam he is in no acute distress  He is belching frequently  His abdomen is completely nontender  Will check CBC, CMP, lipase, EKG and troponin given his history  Will try GI cocktail  This does sound like reflux    Will also do bedside ultrasound to assess       Amount and/or Complexity of Data Reviewed  Clinical lab tests: ordered and reviewed  Independent visualization of images, tracings, or specimens: yes      CritCare Time    Disposition  Final diagnoses:   Heartburn   Elevated serum creatinine     Time reflects when diagnosis was documented in both MDM as applicable and the Disposition within this note     Time User Action Codes Description Comment    6/9/2018 10:55 PM Clare ADAN Add [R12] Heartburn     6/9/2018 10:57 PM Clare ADAN Add [R79 89] Elevated serum creatinine       ED Disposition     ED Disposition Condition Comment    Discharge  Rae Bers  discharge to home/self care  Condition at discharge: Good        Follow-up Information     Follow up With Specialties Details Why Monica Santamaria MD [facility] Schedule an appointment as soon as possible for a visit in 2 days  [address]  [phone]            Patient's Medications   Discharge Prescriptions    OMEPRAZOLE (PRILOSEC) 20 MG DELAYED RELEASE CAPSULE    Take 1 capsule (20 mg total) by mouth daily       Start Date: 6/9/2018  End Date: --       Order Dose: 20 mg       Quantity: 30 capsule    Refills: 0    SUCRALFATE (CARAFATE) 1 G TABLET    Take 1 tablet (1 g total) by mouth 4 (four) times a day (before meals and at bedtime) for 14 days       Start Date: 6/9/2018  End Date: 6/23/2018       Order Dose: 1 g       Quantity: 56 tablet    Refills: 0     No discharge procedures on file      ED Provider  Electronically Signed by           Radha Yadav DO  06/09/18 7177

## 2018-06-10 NOTE — ED PROCEDURE NOTE
PROCEDURE  ECG 12 Lead Documentation  Date/Time: 6/9/2018 10:29 PM  Performed by: Lester Jay  Authorized by: Lester Jay     Indications / Diagnosis:  Heartburn  ECG reviewed by me, the ED Provider: yes    Patient location:  ED  Interpretation:     Interpretation: normal    Rate:     ECG rate assessment: normal    Rhythm:     Rhythm: sinus rhythm    Ectopy:     Ectopy: none    QRS:     QRS axis:  Normal  Conduction:     Conduction: normal    ST segments:     ST segments:  Normal  T waves:     T waves: normal           Sheral Eye, DO  06/09/18 1704

## 2018-06-10 NOTE — DISCHARGE INSTRUCTIONS
Gastroesophageal Reflux Disease   WHAT YOU NEED TO KNOW:   Gastroesophageal reflux occurs when acid and food in the stomach back up into the esophagus  Gastroesophageal reflux disease (GERD) is reflux that occurs more than twice a week for a few weeks  It usually causes heartburn and other symptoms  GERD can cause other health problems over time if it is not treated  DISCHARGE INSTRUCTIONS:   Return to the emergency department if:   · You feel full and cannot burp or vomit  · You have severe chest pain and sudden trouble breathing  · Your bowel movements are black, bloody, or tarry-looking  · Your vomit looks like coffee grounds or has blood in it  Contact your healthcare provider if:   · You vomit large amounts, or you vomit often  · You have trouble breathing after you vomit  · You have trouble swallowing, or pain with swallowing  · You are losing weight without trying  · Your symptoms get worse or do not improve with treatment  · You have questions or concerns about your condition or care  Medicines:   · Medicines  are used to decrease stomach acid  Medicine may also be used to help your lower esophageal sphincter and stomach contract (tighten) more  · Take your medicine as directed  Contact your healthcare provider if you think your medicine is not helping or if you have side effects  Tell him of her if you are allergic to any medicine  Keep a list of the medicines, vitamins, and herbs you take  Include the amounts, and when and why you take them  Bring the list or the pill bottles to follow-up visits  Carry your medicine list with you in case of an emergency  Manage GERD:   · Do not have foods or drinks that may increase heartburn  These include chocolate, peppermint, fried or fatty foods, drinks that contain caffeine, or carbonated drinks (soda)  Other foods include spicy foods, onions, tomatoes, and tomato-based foods   Do not have foods or drinks that can irritate your esophagus, such as citrus fruits, juices, and alcohol  · Do not eat large meals  When you eat a lot of food at one time, your stomach needs more acid to digest it  Eat 6 small meals each day instead of 3 large ones, and eat slowly  Do not eat meals 2 to 3 hours before bedtime  · Elevate the head of your bed  Place 6-inch blocks under the head of your bed frame  You may also use more than one pillow under your head and shoulders while you sleep  · Maintain a healthy weight  If you are overweight, weight loss may help relieve symptoms of GERD  · Do not smoke  Smoking weakens the lower esophageal sphincter and increases the risk of GERD  Ask your healthcare provider for information if you currently smoke and need help to quit  E-cigarettes or smokeless tobacco still contain nicotine  Talk to your healthcare provider before you use these products  · Do not wear clothing that is tight around your waist   Tight clothing can put pressure on your stomach and cause or worsen GERD symptoms  Follow up with your healthcare provider as directed:  Write down your questions so you remember to ask them during your visits  © 2017 [facility] Information is for End User's use only and may not be sold, redistributed or otherwise used for commercial purposes  All illustrations and images included in CareNotes® are the copyrighted property of [employer]  or [address]  The above information is an  only  It is not intended as medical advice for individual conditions or treatments  Talk to your doctor, nurse or pharmacist before following any medical regimen to see if it is safe and effective for you

## 2018-06-28 ENCOUNTER — TELEPHONE (OUTPATIENT)
Dept: FAMILY MEDICINE CLINIC | Facility: CLINIC | Age: 53
End: 2018-06-28

## 2018-06-28 ENCOUNTER — APPOINTMENT (OUTPATIENT)
Dept: LAB | Facility: HOSPITAL | Age: 53
End: 2018-06-28
Payer: COMMERCIAL

## 2018-06-28 ENCOUNTER — TRANSCRIBE ORDERS (OUTPATIENT)
Dept: ADMINISTRATIVE | Facility: HOSPITAL | Age: 53
End: 2018-06-28

## 2018-06-28 DIAGNOSIS — E66.9 OBESITY, UNSPECIFIED CLASSIFICATION, UNSPECIFIED OBESITY TYPE, UNSPECIFIED WHETHER SERIOUS COMORBIDITY PRESENT: ICD-10-CM

## 2018-06-28 DIAGNOSIS — E11.8 UNCONTROLLED TYPE 2 DIABETES MELLITUS WITH COMPLICATION, UNSPECIFIED WHETHER LONG TERM INSULIN USE: ICD-10-CM

## 2018-06-28 DIAGNOSIS — N18.30 CHRONIC KIDNEY DISEASE, STAGE III (MODERATE) (HCC): ICD-10-CM

## 2018-06-28 DIAGNOSIS — E11.8 UNCONTROLLED TYPE 2 DIABETES MELLITUS WITH COMPLICATION, UNSPECIFIED WHETHER LONG TERM INSULIN USE: Primary | ICD-10-CM

## 2018-06-28 DIAGNOSIS — E11.8 TYPE 2 DIABETES MELLITUS WITH COMPLICATION, UNSPECIFIED WHETHER LONG TERM INSULIN USE: Primary | ICD-10-CM

## 2018-06-28 DIAGNOSIS — I10 ESSENTIAL HYPERTENSION: ICD-10-CM

## 2018-06-28 DIAGNOSIS — E11.65 UNCONTROLLED TYPE 2 DIABETES MELLITUS WITH COMPLICATION, UNSPECIFIED WHETHER LONG TERM INSULIN USE: ICD-10-CM

## 2018-06-28 DIAGNOSIS — E11.65 UNCONTROLLED TYPE 2 DIABETES MELLITUS WITH COMPLICATION, UNSPECIFIED WHETHER LONG TERM INSULIN USE: Primary | ICD-10-CM

## 2018-06-28 LAB
BASOPHILS # BLD AUTO: 0.1 THOUSANDS/ΜL (ref 0–0.1)
BASOPHILS NFR BLD AUTO: 2 % (ref 0–1)
CHOLEST SERPL-MCNC: 185 MG/DL
CREAT UR-MCNC: 290 MG/DL
EOSINOPHIL # BLD AUTO: 0.3 THOUSAND/ΜL (ref 0–0.4)
EOSINOPHIL NFR BLD AUTO: 4 % (ref 0–6)
ERYTHROCYTE [DISTWIDTH] IN BLOOD BY AUTOMATED COUNT: 14.2 %
EST. AVERAGE GLUCOSE BLD GHB EST-MCNC: 117 MG/DL
HBA1C MFR BLD: 5.7 % (ref 4.2–6.3)
HCT VFR BLD AUTO: 37.5 % (ref 41–53)
HDLC SERPL-MCNC: 39 MG/DL (ref 40–59)
HGB BLD-MCNC: 12.7 G/DL (ref 13.5–17.5)
LDLC SERPL CALC-MCNC: 128 MG/DL
LYMPHOCYTES # BLD AUTO: 2 THOUSANDS/ΜL (ref 0.5–4)
LYMPHOCYTES NFR BLD AUTO: 34 % (ref 20–50)
MCH RBC QN AUTO: 32.9 PG (ref 26–34)
MCHC RBC AUTO-ENTMCNC: 34 G/DL (ref 31–36)
MCV RBC AUTO: 97 FL (ref 80–100)
MICROALBUMIN UR-MCNC: 5400 MG/L (ref 0–20)
MICROALBUMIN/CREAT 24H UR: 1862 MG/G CREATININE (ref 0–30)
MONOCYTES # BLD AUTO: 0.6 THOUSAND/ΜL (ref 0.2–0.9)
MONOCYTES NFR BLD AUTO: 11 % (ref 1–10)
NEUTROPHILS # BLD AUTO: 2.9 THOUSANDS/ΜL (ref 1.8–7.8)
NEUTS SEG NFR BLD AUTO: 50 % (ref 45–65)
NONHDLC SERPL-MCNC: 146 MG/DL
PLATELET # BLD AUTO: 302 THOUSANDS/UL (ref 150–450)
PMV BLD AUTO: 8.3 FL (ref 8.9–12.7)
RBC # BLD AUTO: 3.87 MILLION/UL (ref 4.5–5.9)
TRIGL SERPL-MCNC: 90 MG/DL
TSH SERPL DL<=0.05 MIU/L-ACNC: 1.22 UIU/ML (ref 0.47–4.68)
WBC # BLD AUTO: 5.9 THOUSAND/UL (ref 4.5–11)

## 2018-06-28 PROCEDURE — 84443 ASSAY THYROID STIM HORMONE: CPT

## 2018-06-28 PROCEDURE — 83036 HEMOGLOBIN GLYCOSYLATED A1C: CPT

## 2018-06-28 PROCEDURE — 82570 ASSAY OF URINE CREATININE: CPT | Performed by: FAMILY MEDICINE

## 2018-06-28 PROCEDURE — 82043 UR ALBUMIN QUANTITATIVE: CPT | Performed by: FAMILY MEDICINE

## 2018-06-28 PROCEDURE — 36415 COLL VENOUS BLD VENIPUNCTURE: CPT

## 2018-06-28 PROCEDURE — 80061 LIPID PANEL: CPT

## 2018-06-28 PROCEDURE — 85025 COMPLETE CBC W/AUTO DIFF WBC: CPT

## 2018-06-28 NOTE — TELEPHONE ENCOUNTER
Hector Machuca called from [facility] in regarding a patient who is going to their office for a visit on 6/28/18  He needs a referral for his visit for a wound on his foot  The referral can be sent to [phone] (fax) and if you have any questions you can call the office at [phone] and ask for Hector Machuca

## 2018-07-12 PROBLEM — N18.2 STAGE 2 CHRONIC KIDNEY DISEASE: Status: ACTIVE | Noted: 2017-08-16

## 2018-07-12 PROBLEM — K76.0 NONALCOHOLIC FATTY LIVER DISEASE: Status: ACTIVE | Noted: 2018-01-04

## 2018-08-11 DIAGNOSIS — Z79.4 TYPE 2 DIABETES MELLITUS WITH FOOT ULCER, WITH LONG-TERM CURRENT USE OF INSULIN (HCC): Primary | ICD-10-CM

## 2018-08-11 DIAGNOSIS — L97.509 TYPE 2 DIABETES MELLITUS WITH FOOT ULCER, WITH LONG-TERM CURRENT USE OF INSULIN (HCC): Primary | ICD-10-CM

## 2018-08-11 DIAGNOSIS — E11.621 TYPE 2 DIABETES MELLITUS WITH FOOT ULCER, WITH LONG-TERM CURRENT USE OF INSULIN (HCC): Primary | ICD-10-CM

## 2018-08-13 RX ORDER — SITAGLIPTIN 50 MG/1
TABLET, FILM COATED ORAL
Qty: 30 TABLET | Refills: 0 | Status: SHIPPED | OUTPATIENT
Start: 2018-08-13 | End: 2018-09-28 | Stop reason: SDUPTHER

## 2018-08-13 RX ORDER — EMPAGLIFLOZIN 25 MG/1
TABLET, FILM COATED ORAL
Qty: 30 TABLET | Refills: 0 | Status: SHIPPED | OUTPATIENT
Start: 2018-08-13 | End: 2018-09-28 | Stop reason: SDUPTHER

## 2018-08-13 RX ORDER — METOPROLOL SUCCINATE 25 MG/1
TABLET, EXTENDED RELEASE ORAL
Qty: 30 TABLET | Refills: 0 | Status: SHIPPED | OUTPATIENT
Start: 2018-08-13 | End: 2018-09-28 | Stop reason: SDUPTHER

## 2018-08-13 NOTE — TELEPHONE ENCOUNTER
sent to the pharmacy 30 day supplies of his medication with 0 refill  Patient is due for appointment to call the patient schedule blood work on appointment follow-up         Documentation

## 2018-09-28 DIAGNOSIS — L97.509 TYPE 2 DIABETES MELLITUS WITH FOOT ULCER, WITH LONG-TERM CURRENT USE OF INSULIN (HCC): ICD-10-CM

## 2018-09-28 DIAGNOSIS — E11.621 TYPE 2 DIABETES MELLITUS WITH FOOT ULCER, WITH LONG-TERM CURRENT USE OF INSULIN (HCC): ICD-10-CM

## 2018-09-28 DIAGNOSIS — Z79.4 TYPE 2 DIABETES MELLITUS WITH FOOT ULCER, WITH LONG-TERM CURRENT USE OF INSULIN (HCC): ICD-10-CM

## 2018-09-28 RX ORDER — METOPROLOL SUCCINATE 25 MG/1
TABLET, EXTENDED RELEASE ORAL
Qty: 30 TABLET | Refills: 0 | Status: SHIPPED | OUTPATIENT
Start: 2018-09-28 | End: 2018-10-28 | Stop reason: SDUPTHER

## 2018-09-28 RX ORDER — SITAGLIPTIN 50 MG/1
TABLET, FILM COATED ORAL
Qty: 30 TABLET | Refills: 0 | Status: SHIPPED | OUTPATIENT
Start: 2018-09-28 | End: 2018-10-28 | Stop reason: SDUPTHER

## 2018-09-28 RX ORDER — EMPAGLIFLOZIN 25 MG/1
TABLET, FILM COATED ORAL
Qty: 30 TABLET | Refills: 0 | Status: SHIPPED | OUTPATIENT
Start: 2018-09-28 | End: 2018-10-28 | Stop reason: SDUPTHER

## 2018-10-09 ENCOUNTER — OFFICE VISIT (OUTPATIENT)
Dept: FAMILY MEDICINE CLINIC | Facility: CLINIC | Age: 53
End: 2018-10-09
Payer: COMMERCIAL

## 2018-10-09 VITALS
HEART RATE: 56 BPM | TEMPERATURE: 97.9 F | HEIGHT: 72 IN | WEIGHT: 278 LBS | BODY MASS INDEX: 37.65 KG/M2 | SYSTOLIC BLOOD PRESSURE: 120 MMHG | DIASTOLIC BLOOD PRESSURE: 80 MMHG | OXYGEN SATURATION: 99 %

## 2018-10-09 DIAGNOSIS — Z23 NEED FOR INFLUENZA VACCINATION: ICD-10-CM

## 2018-10-09 DIAGNOSIS — K76.0 FATTY LIVER DISEASE, NONALCOHOLIC: ICD-10-CM

## 2018-10-09 DIAGNOSIS — F33.0 MILD EPISODE OF RECURRENT MAJOR DEPRESSIVE DISORDER (HCC): ICD-10-CM

## 2018-10-09 DIAGNOSIS — N18.2 STAGE 2 CHRONIC KIDNEY DISEASE: ICD-10-CM

## 2018-10-09 DIAGNOSIS — I10 ESSENTIAL HYPERTENSION: ICD-10-CM

## 2018-10-09 DIAGNOSIS — L91.8 SKIN TAG: ICD-10-CM

## 2018-10-09 DIAGNOSIS — Z00.01 ENCOUNTER FOR ROUTINE ADULT HEALTH EXAMINATION WITH ABNORMAL FINDINGS: Primary | ICD-10-CM

## 2018-10-09 DIAGNOSIS — E11.51 TYPE II DIABETES MELLITUS WITH PERIPHERAL CIRCULATORY DISORDER (HCC): ICD-10-CM

## 2018-10-09 PROBLEM — F33.9 RECURRENT MAJOR DEPRESSIVE DISORDER (HCC): Status: ACTIVE | Noted: 2018-10-09

## 2018-10-09 PROCEDURE — 99396 PREV VISIT EST AGE 40-64: CPT | Performed by: FAMILY MEDICINE

## 2018-10-09 PROCEDURE — 3725F SCREEN DEPRESSION PERFORMED: CPT | Performed by: FAMILY MEDICINE

## 2018-10-09 RX ORDER — SILDENAFIL 50 MG/1
50 TABLET, FILM COATED ORAL
COMMUNITY
Start: 2013-08-22 | End: 2019-10-18 | Stop reason: SINTOL

## 2018-10-09 RX ORDER — TRAMADOL HYDROCHLORIDE 50 MG/1
TABLET ORAL
COMMUNITY
End: 2018-12-13 | Stop reason: SINTOL

## 2018-10-09 RX ORDER — LANCETS 28 GAUGE
EACH MISCELLANEOUS
COMMUNITY
Start: 2015-02-17 | End: 2020-05-05 | Stop reason: ALTCHOICE

## 2018-10-09 NOTE — ASSESSMENT & PLAN NOTE
Lifestyle modification including increased activity 30 min a day 5 days a week and portion control and healthy diet discussed with the patient

## 2018-10-09 NOTE — ASSESSMENT & PLAN NOTE
Her depression screen is positive patient had history of depression we discussed the proper treatment and refer to psychotherapist and patient declined

## 2018-10-09 NOTE — PROGRESS NOTES
[facility] HEALTH MAINTENANCE OFFICE VISIT  [facility] [place] [facility] [place]    NAME: Radha Howard  AGE: 46 y o  SEX: male  : 1965     DATE: 10/9/2018    Assessment and Plan     Problem List Items Addressed This Visit     Hypertension    Relevant Orders    CBC and differential    Comprehensive metabolic panel    Hemoglobin A1C    Lipid panel    Microalbumin / creatinine urine ratio    TSH, 3rd generation with Free T4 reflex    Stage 2 chronic kidney disease    Relevant Orders    CBC and differential    Comprehensive metabolic panel    Hemoglobin A1C    Lipid panel    Microalbumin / creatinine urine ratio    TSH, 3rd generation with Free T4 reflex    BMI 37 0-37 9, adult     Lifestyle modification including increased activity 30 min a day 5 days a week and portion control and healthy diet discussed with the patient         Skin tag     Asymptomatic around the neck plan to refer the patient to Dermatology         Relevant Orders    Ambulatory referral to Dermatology    Recurrent major depressive disorder ([facility] [address] )     Her depression screen is positive patient had history of depression we discussed the proper treatment and refer to psychotherapist and patient declined           Other Visit Diagnoses     Encounter for routine adult health examination with abnormal findings    -  Primary    Increased activity healthy diet discussed with the patient also discussed immunization he declined flu shot and shingirix  well hydration and sunscreen    Relevant Orders    CBC and differential    Comprehensive metabolic panel    Hemoglobin A1C    Lipid panel    Microalbumin / creatinine urine ratio    TSH, 3rd generation with Free T4 reflex    PSA, Total Screen    Need for influenza vaccination        Relevant Orders    influenza vaccine, [id number], quadrivalent, recombinant, PF, 0 5 mL, for patients 18 yr+ (FLUBLOK)    Type II diabetes mellitus with peripheral circulatory disorder ([facility] [address] )        Relevant Orders    CBC and differential    Comprehensive metabolic panel    Hemoglobin A1C    Lipid panel    Microalbumin / creatinine urine ratio    TSH, 3rd generation with Free T4 reflex    Fatty liver disease, nonalcoholic        Relevant Orders    CBC and differential    Comprehensive metabolic panel    Hemoglobin A1C    Lipid panel    Microalbumin / creatinine urine ratio    TSH, 3rd generation with Free T4 reflex            · Patient Counseling:   · Nutrition: Stressed importance of a well balanced diet, moderation of sodium/saturated fat, caloric balance and sufficient intake of fiber  · Exercise: Stressed the importance of regular exercise with a goal of 150 minutes per week  · Dental Health: Discussed daily flossing and brushing and regular dental visits     · Immunizations reviewed Declined flu shot for today the patient is Tdap was last 5 years no record chart  · Discussed benefits of screening  Patient is up-to-date with colonoscopy 2017 and he has good for 5 years  · Discussed the patient's BMI with him    The BMI is above average; BMI management plan is completed         Chief Complaint     Chief Complaint   Patient presents with    Physical Exam       History of Present Illness     Patient here for his annual physical exam patient deny any chest pain short of breath no palpitation no headache no blurred vision no weakness or lateralized of the symptom no abdomen pain nausea vomiting or diarrhea no renal problem no rash no fever the patient is former smoker her rarely do exercise up-to-date with his colonoscopy  Patient concerned about the skin lesion around the neck and sometimes she get caught was color of the short and bleeding is not painful no change in the size        Well Adult Physical   Patient here for a comprehensive physical exam       Diet and Physical Activity  Diet: low carbohydrate diet and low salt diet  Weight concerns: Patient has class 2 obesity (BMI 35 0-39  9)  Exercise: rarely      Depression Screen  PHQ-9 Depression Screening    PHQ-9:    Frequency of the following problems over the past two weeks:       Little interest or pleasure in doing things:  0 - not at all  Feeling down, depressed, or hopeless:  2 - more than half the days  Trouble falling or staying asleep, or sleeping too much:  2 - more than half the days  Feeling tired or having little energy:  0 - not at all  Poor appetite or overeatin - not at all  Feeling bad about yourself - or that you are a failure or have let yourself or your family down:  0 - not at all  Trouble concentrating on things, such as reading the newspaper or watching television:  1 - several days  Moving or speaking so slowly that other people could have noticed  Or the opposite - being so fidgety or restless that you have been moving around a lot more than usual:  0 - not at all  Thoughts that you would be better off dead, or of hurting yourself in some way:  0 - not at all  PHQ-2 Score:  2  PHQ-9 Score:  5          General Health  Hearing: Normal:  bilateral  Vision: no vision problems  Dental: Patient has a denture        The following portions of the patient's history were reviewed and updated as appropriate: allergies, current medications, past family history, past medical history, past social history, past surgical history and problem list     Review of Systems     Review of Systems   Constitutional: Negative for fatigue and fever  HENT: Negative for ear pain, sinus pain, sinus pressure and sore throat  Eyes: Negative for pain and redness  Respiratory: Negative for cough, chest tightness and shortness of breath  Cardiovascular: Negative for chest pain, palpitations and leg swelling  Gastrointestinal: Negative for abdominal pain, blood in stool, constipation, diarrhea and nausea  Genitourinary: Negative for flank pain, frequency and hematuria  Musculoskeletal: Negative for back pain and joint swelling  Skin: Negative for rash  Neurological: Negative for dizziness, numbness and headaches  Hematological: Does not bruise/bleed easily  Past Medical History     Past Medical History:   Diagnosis Date    Anemia     CAD in native artery     Chronic kidney disease     Depression     Diabetes mellitus ([address] )     History of implantable cardioverter-defibrillator (ICD) placement     [employer]    Hypertension     Myocardial infarct, old 2006    in setting of cocaine use   Obesity     Peripheral neuropathy     Ventricular tachycardia [facility])        Past Surgical History     Past Surgical History:   Procedure Laterality Date    APPENDECTOMY      CARDIAC DEFIBRILLATOR PLACEMENT  2006    MASTECTOMY FOR GYNECOMASTIA  09/2015    REDUCTION MAMMAPLASTY  09/29/2015    TOE AMPUTATION      Hallux amputation    TOE AMPUTATION Right 1/18/2018    Procedure: AMPUTATION 2ND TOE;  Surgeon: Emma Mars DPM;  Location: [facility];  Service: Podiatry    WOUND DEBRIDEMENT Left 6/16/2016    Procedure: DEBRIDEMENT FOOT/TOE [facility] OUT); Transmetatasral vs Lisfranc amputation , bone biopsy,;  Surgeon: Emma Mars DPM;  Location: [facility];  Service:        Social History     Social History     Social History    Marital status: Single     Spouse name: N/A    Number of children: N/A    Years of education: N/A     Social History Main Topics    Smoking status: Former Smoker     Packs/day: 1 00     Years: 18 00     Types: Cigarettes     Start date: 1988     Quit date: 2006    Smokeless tobacco: Former User    Alcohol use Yes      Comment: social-every few months    Drug use: Yes     Frequency: 1 0 time per week     Types: Marijuana    Sexual activity: Yes     Partners: Female     Birth control/ protection: None     Other Topics Concern    None     Social History Narrative    None       Family History     History reviewed  No pertinent family history      Current Medications       Current Outpatient Prescriptions:   aspirin 81 MG tablet, Take 81 mg by mouth daily, Disp: , Rfl:     glucose blood test strip, take 1 by Topical route test bid, Disp: , Rfl:     JANUVIA 50 MG tablet, take 1 tablet by mouth once daily, Disp: 30 tablet, Rfl: 0    JARDIANCE 25 MG TABS, take 1 tablet by mouth every morning, Disp: 30 tablet, Rfl: 0    Lancets (FREESTYLE) lancets, Tests bid, Disp: , Rfl:     metoprolol succinate (TOPROL-XL) 25 mg 24 hr tablet, take 1 tablet by mouth once daily, Disp: 30 tablet, Rfl: 0    NIFEdipine ER (ADALAT CC) 30 MG 24 hr tablet, Take 30 mg by mouth daily, Disp: , Rfl:     omeprazole (PriLOSEC) 20 mg delayed release capsule, Take 1 capsule (20 mg total) by mouth daily, Disp: 30 capsule, Rfl: 0    sildenafil (VIAGRA) 50 MG tablet, 50 mg, Disp: , Rfl:     simvastatin (ZOCOR) 10 mg tablet, Take 10 mg by mouth daily at bedtime, Disp: , Rfl:     traMADol (ULTRAM) 50 mg tablet, tramadol 50 mg tablet, Disp: , Rfl:     sucralfate (CARAFATE) 1 g tablet, Take 1 tablet (1 g total) by mouth 4 (four) times a day (before meals and at bedtime) for 14 days, Disp: 56 tablet, Rfl: 0     Allergies     Allergies   Allergen Reactions    Penicillins Hives     Ancef TAKEN,Childhood       Objective     /80   Pulse 56   Temp 97 9 °F (36 6 °C) (Oral)   Ht 6' (1 829 m)   Wt 126 kg (278 lb)   SpO2 99%   BMI 37 70 kg/m²      Physical Exam   Constitutional: He is oriented to person, place, and time  He appears well-developed and well-nourished  HENT:   Head: Normocephalic  Right Ear: External ear normal    Left Ear: External ear normal    Eyes: Conjunctivae and EOM are normal  Right eye exhibits no discharge  Left eye exhibits no discharge  Neck: No JVD present  Cardiovascular: Normal rate, regular rhythm and normal heart sounds  Exam reveals no gallop  No murmur heard  Pulmonary/Chest: Effort normal  No respiratory distress  He has no wheezes  He has no rales  He exhibits no tenderness     Abdominal: He exhibits no mass  There is no tenderness  There is no rebound  Musculoskeletal: He exhibits no edema or tenderness  Neurological: He is alert and oriented to person, place, and time  Skin: No rash noted  No erythema           Health Maintenance     Health Maintenance   Topic Date Due    Depression Screening PHQ  1965    CRC Screening: Colonoscopy  1965    Diabetic Foot Exam  06/13/1974    DM Eye Exam  06/13/1974    DTaP,Tdap,and Td Vaccines (1 - Tdap) 06/13/1985    Pneumococcal PPSV23 Highest Risk Adult (2 of 3 - PCV13) 08/30/2017    INFLUENZA VACCINE  09/01/2018    URINE MICROALBUMIN  06/28/2019     Immunization History   Administered Date(s) Administered    Pneumococcal Polysaccharide PPV23 08/30/2016       Reanna Oseguera MD  [address] [place] [address]

## 2018-10-28 DIAGNOSIS — Z79.4 TYPE 2 DIABETES MELLITUS WITH FOOT ULCER, WITH LONG-TERM CURRENT USE OF INSULIN (HCC): ICD-10-CM

## 2018-10-28 DIAGNOSIS — E11.621 TYPE 2 DIABETES MELLITUS WITH FOOT ULCER, WITH LONG-TERM CURRENT USE OF INSULIN (HCC): ICD-10-CM

## 2018-10-28 DIAGNOSIS — L97.509 TYPE 2 DIABETES MELLITUS WITH FOOT ULCER, WITH LONG-TERM CURRENT USE OF INSULIN (HCC): ICD-10-CM

## 2018-10-28 RX ORDER — SITAGLIPTIN 50 MG/1
TABLET, FILM COATED ORAL
Qty: 30 TABLET | Refills: 0 | Status: SHIPPED | OUTPATIENT
Start: 2018-10-28 | End: 2018-11-27 | Stop reason: SDUPTHER

## 2018-10-28 RX ORDER — EMPAGLIFLOZIN 25 MG/1
TABLET, FILM COATED ORAL
Qty: 30 TABLET | Refills: 0 | Status: SHIPPED | OUTPATIENT
Start: 2018-10-28 | End: 2018-11-27 | Stop reason: SDUPTHER

## 2018-10-28 RX ORDER — METOPROLOL SUCCINATE 25 MG/1
TABLET, EXTENDED RELEASE ORAL
Qty: 30 TABLET | Refills: 0 | Status: SHIPPED | OUTPATIENT
Start: 2018-10-28 | End: 2018-11-27 | Stop reason: SDUPTHER

## 2018-11-06 DIAGNOSIS — R12 HEARTBURN: ICD-10-CM

## 2018-11-06 RX ORDER — OMEPRAZOLE 20 MG/1
20 CAPSULE, DELAYED RELEASE ORAL DAILY
Qty: 30 CAPSULE | Refills: 3 | Status: SHIPPED | OUTPATIENT
Start: 2018-11-06 | End: 2018-11-07 | Stop reason: SDUPTHER

## 2018-11-07 DIAGNOSIS — R12 HEARTBURN: ICD-10-CM

## 2018-11-07 RX ORDER — OMEPRAZOLE 20 MG/1
20 CAPSULE, DELAYED RELEASE ORAL DAILY
Qty: 30 CAPSULE | Refills: 1 | Status: SHIPPED | OUTPATIENT
Start: 2018-11-07 | End: 2019-01-24 | Stop reason: SDDI

## 2018-11-27 DIAGNOSIS — E11.621 TYPE 2 DIABETES MELLITUS WITH FOOT ULCER, WITH LONG-TERM CURRENT USE OF INSULIN (HCC): ICD-10-CM

## 2018-11-27 DIAGNOSIS — L97.509 TYPE 2 DIABETES MELLITUS WITH FOOT ULCER, WITH LONG-TERM CURRENT USE OF INSULIN (HCC): ICD-10-CM

## 2018-11-27 DIAGNOSIS — Z79.4 TYPE 2 DIABETES MELLITUS WITH FOOT ULCER, WITH LONG-TERM CURRENT USE OF INSULIN (HCC): ICD-10-CM

## 2018-11-27 RX ORDER — SITAGLIPTIN 50 MG/1
TABLET, FILM COATED ORAL
Qty: 30 TABLET | Refills: 0 | Status: SHIPPED | OUTPATIENT
Start: 2018-11-27 | End: 2018-12-13 | Stop reason: SDDI

## 2018-11-27 RX ORDER — EMPAGLIFLOZIN 25 MG/1
TABLET, FILM COATED ORAL
Qty: 30 TABLET | Refills: 0 | Status: SHIPPED | OUTPATIENT
Start: 2018-11-27 | End: 2018-12-13 | Stop reason: SINTOL

## 2018-11-27 RX ORDER — METOPROLOL SUCCINATE 25 MG/1
TABLET, EXTENDED RELEASE ORAL
Qty: 30 TABLET | Refills: 0 | Status: SHIPPED | OUTPATIENT
Start: 2018-11-27 | End: 2018-12-27 | Stop reason: SDUPTHER

## 2018-11-28 ENCOUNTER — APPOINTMENT (OUTPATIENT)
Dept: LAB | Facility: HOSPITAL | Age: 53
End: 2018-11-28
Payer: COMMERCIAL

## 2018-11-28 DIAGNOSIS — Z00.01 ENCOUNTER FOR ROUTINE ADULT HEALTH EXAMINATION WITH ABNORMAL FINDINGS: ICD-10-CM

## 2018-11-28 DIAGNOSIS — I10 ESSENTIAL HYPERTENSION: ICD-10-CM

## 2018-11-28 DIAGNOSIS — E11.51 TYPE II DIABETES MELLITUS WITH PERIPHERAL CIRCULATORY DISORDER (HCC): ICD-10-CM

## 2018-11-28 DIAGNOSIS — K76.0 FATTY LIVER DISEASE, NONALCOHOLIC: ICD-10-CM

## 2018-11-28 DIAGNOSIS — N18.2 STAGE 2 CHRONIC KIDNEY DISEASE: ICD-10-CM

## 2018-11-28 LAB
ALBUMIN SERPL BCP-MCNC: 4.4 G/DL (ref 3–5.2)
ALP SERPL-CCNC: 70 U/L (ref 43–122)
ALT SERPL W P-5'-P-CCNC: 17 U/L (ref 9–52)
ANION GAP SERPL CALCULATED.3IONS-SCNC: 7 MMOL/L (ref 5–14)
AST SERPL W P-5'-P-CCNC: 22 U/L (ref 17–59)
BASOPHILS # BLD AUTO: 0.1 THOUSANDS/ΜL (ref 0–0.1)
BASOPHILS NFR BLD AUTO: 1 % (ref 0–1)
BILIRUB SERPL-MCNC: 0.3 MG/DL
BUN SERPL-MCNC: 33 MG/DL (ref 5–25)
CALCIUM SERPL-MCNC: 9.1 MG/DL (ref 8.4–10.2)
CHLORIDE SERPL-SCNC: 107 MMOL/L (ref 97–108)
CHOLEST SERPL-MCNC: 189 MG/DL
CO2 SERPL-SCNC: 26 MMOL/L (ref 22–30)
CREAT SERPL-MCNC: 3.56 MG/DL (ref 0.7–1.5)
CREAT UR-MCNC: 222 MG/DL
EOSINOPHIL # BLD AUTO: 0.3 THOUSAND/ΜL (ref 0–0.4)
EOSINOPHIL NFR BLD AUTO: 5 % (ref 0–6)
ERYTHROCYTE [DISTWIDTH] IN BLOOD BY AUTOMATED COUNT: 14 %
EST. AVERAGE GLUCOSE BLD GHB EST-MCNC: 111 MG/DL
GFR SERPL CREATININE-BSD FRML MDRD: 21 ML/MIN/1.73SQ M
GLUCOSE SERPL-MCNC: 158 MG/DL (ref 70–99)
HBA1C MFR BLD: 5.5 % (ref 4.2–6.3)
HCT VFR BLD AUTO: 40.6 % (ref 41–53)
HDLC SERPL-MCNC: 29 MG/DL (ref 40–59)
HGB BLD-MCNC: 13.3 G/DL (ref 13.5–17.5)
LDLC SERPL CALC-MCNC: 127 MG/DL
LYMPHOCYTES # BLD AUTO: 2.7 THOUSANDS/ΜL (ref 0.5–4)
LYMPHOCYTES NFR BLD AUTO: 36 % (ref 20–50)
MCH RBC QN AUTO: 32.6 PG (ref 26–34)
MCHC RBC AUTO-ENTMCNC: 32.8 G/DL (ref 31–36)
MCV RBC AUTO: 100 FL (ref 80–100)
MICROALBUMIN UR-MCNC: 2890 MG/L (ref 0–20)
MICROALBUMIN/CREAT 24H UR: 1302 MG/G CREATININE (ref 0–30)
MONOCYTES # BLD AUTO: 0.7 THOUSAND/ΜL (ref 0.2–0.9)
MONOCYTES NFR BLD AUTO: 9 % (ref 1–10)
NEUTROPHILS # BLD AUTO: 3.8 THOUSANDS/ΜL (ref 1.8–7.8)
NEUTS SEG NFR BLD AUTO: 50 % (ref 45–65)
NONHDLC SERPL-MCNC: 160 MG/DL
PLATELET # BLD AUTO: 269 THOUSANDS/UL (ref 150–450)
PMV BLD AUTO: 8.7 FL (ref 8.9–12.7)
POTASSIUM SERPL-SCNC: 5 MMOL/L (ref 3.6–5)
PROT SERPL-MCNC: 8.5 G/DL (ref 5.9–8.4)
RBC # BLD AUTO: 4.08 MILLION/UL (ref 4.5–5.9)
SODIUM SERPL-SCNC: 140 MMOL/L (ref 137–147)
TRIGL SERPL-MCNC: 167 MG/DL
TSH SERPL DL<=0.05 MIU/L-ACNC: 1.02 UIU/ML (ref 0.47–4.68)
WBC # BLD AUTO: 7.6 THOUSAND/UL (ref 4.5–11)

## 2018-11-28 PROCEDURE — 82043 UR ALBUMIN QUANTITATIVE: CPT

## 2018-11-28 PROCEDURE — 80053 COMPREHEN METABOLIC PANEL: CPT

## 2018-11-28 PROCEDURE — 82570 ASSAY OF URINE CREATININE: CPT

## 2018-11-28 PROCEDURE — 36415 COLL VENOUS BLD VENIPUNCTURE: CPT

## 2018-11-28 PROCEDURE — 83036 HEMOGLOBIN GLYCOSYLATED A1C: CPT

## 2018-11-28 PROCEDURE — 85025 COMPLETE CBC W/AUTO DIFF WBC: CPT

## 2018-11-28 PROCEDURE — G0103 PSA SCREENING: HCPCS

## 2018-11-28 PROCEDURE — 80061 LIPID PANEL: CPT

## 2018-11-28 PROCEDURE — 84443 ASSAY THYROID STIM HORMONE: CPT

## 2018-11-30 LAB — PSA SERPL-MCNC: 0.4 NG/ML (ref 0–4)

## 2018-12-13 ENCOUNTER — OFFICE VISIT (OUTPATIENT)
Dept: FAMILY MEDICINE CLINIC | Facility: CLINIC | Age: 53
End: 2018-12-13
Payer: COMMERCIAL

## 2018-12-13 VITALS
BODY MASS INDEX: 38.22 KG/M2 | SYSTOLIC BLOOD PRESSURE: 150 MMHG | TEMPERATURE: 99 F | WEIGHT: 273 LBS | HEART RATE: 56 BPM | OXYGEN SATURATION: 100 % | HEIGHT: 71 IN | DIASTOLIC BLOOD PRESSURE: 90 MMHG

## 2018-12-13 DIAGNOSIS — I10 ESSENTIAL HYPERTENSION: ICD-10-CM

## 2018-12-13 DIAGNOSIS — Z79.4 TYPE 2 DIABETES MELLITUS WITH FOOT ULCER, WITH LONG-TERM CURRENT USE OF INSULIN (HCC): Primary | ICD-10-CM

## 2018-12-13 DIAGNOSIS — L97.509 TYPE 2 DIABETES MELLITUS WITH FOOT ULCER, WITH LONG-TERM CURRENT USE OF INSULIN (HCC): Primary | ICD-10-CM

## 2018-12-13 DIAGNOSIS — R80.8 OTHER PROTEINURIA: ICD-10-CM

## 2018-12-13 DIAGNOSIS — E11.9 DIABETIC EYE EXAM (HCC): ICD-10-CM

## 2018-12-13 DIAGNOSIS — Z01.00 DIABETIC EYE EXAM (HCC): ICD-10-CM

## 2018-12-13 DIAGNOSIS — K76.0 NONALCOHOLIC FATTY LIVER DISEASE: ICD-10-CM

## 2018-12-13 DIAGNOSIS — E11.621 TYPE 2 DIABETES MELLITUS WITH FOOT ULCER, WITH LONG-TERM CURRENT USE OF INSULIN (HCC): Primary | ICD-10-CM

## 2018-12-13 DIAGNOSIS — Z11.59 NEED FOR HEPATITIS C SCREENING TEST: ICD-10-CM

## 2018-12-13 DIAGNOSIS — E78.2 MIXED HYPERLIPIDEMIA: ICD-10-CM

## 2018-12-13 PROBLEM — N18.4 STAGE 4 CHRONIC KIDNEY DISEASE (HCC): Status: ACTIVE | Noted: 2018-12-13

## 2018-12-13 PROCEDURE — 3008F BODY MASS INDEX DOCD: CPT | Performed by: FAMILY MEDICINE

## 2018-12-13 PROCEDURE — 3066F NEPHROPATHY DOC TX: CPT | Performed by: FAMILY MEDICINE

## 2018-12-13 PROCEDURE — 1036F TOBACCO NON-USER: CPT | Performed by: FAMILY MEDICINE

## 2018-12-13 PROCEDURE — 99214 OFFICE O/P EST MOD 30 MIN: CPT | Performed by: FAMILY MEDICINE

## 2018-12-13 NOTE — PROGRESS NOTES
Subjective:   Chief Complaint   Patient presents with    Follow-up     chronic conditions        Patient ID: Theo Meneses  is a 46 y o  male  Patient here follow-up with a chronic condition  Patient's history of hyper tension he supposed to be on metoprolol and nifedipine and patient has not been taking his medication for more than 2 week and per patient has not been feeling good he had some diarrhea and vomiting so he stop his medication patient deny any chest pain short of breath no palpitation no headache and no blurred vision no weakness or lateralized of the symptom  Also patient has history of hyperlipidemia he supposed to be on statin he has not been taking it for more than 2 week  And patient was history of the chronic kidney disease and he has been followed by Nephrology recent blood work which show increase in creatinine decrease in the GFR and the patient and did the blood work after he had the vomiting and the diarrhea patient also on tramadol for his pain  Patient's history of nonalcoholic fatty liver and he is asymptomatic no abdomen pain and no diarrhea no constipation no weight change  Recent blood work discussed with the patient        The following portions of the patient's history were reviewed and updated as appropriate: allergies, current medications, past family history, past medical history, past social history, past surgical history and problem list     Review of Systems   Constitutional: Negative for fatigue and fever  HENT: Negative for ear pain, sinus pain, sinus pressure and sore throat  Eyes: Negative for pain and redness  Respiratory: Negative for cough, chest tightness and shortness of breath  Cardiovascular: Negative for chest pain, palpitations and leg swelling  Gastrointestinal: Negative for abdominal pain, blood in stool, constipation, diarrhea and nausea  Genitourinary: Negative for flank pain, frequency and hematuria     Musculoskeletal: Negative for back pain and joint swelling  Skin: Negative for rash  Neurological: Negative for dizziness, numbness and headaches  Hematological: Does not bruise/bleed easily  Objective:  Vitals:    12/13/18 1342   BP: 150/90   Pulse: 56   Temp: 99 °F (37 2 °C)   TempSrc: Oral   SpO2: 100%   Weight: 124 kg (273 lb)   Height: 5' 11" (1 803 m)      Physical Exam   Constitutional: He is oriented to person, place, and time  He appears well-developed and well-nourished  HENT:   Head: Normocephalic  Right Ear: External ear normal    Left Ear: External ear normal    Eyes: Conjunctivae and EOM are normal  Right eye exhibits no discharge  Left eye exhibits no discharge  Neck: No JVD present  Cardiovascular: Normal rate and regular rhythm  Exam reveals no gallop  Murmur heard  Pulmonary/Chest: Effort normal  No respiratory distress  He has no wheezes  He has no rales  He exhibits no tenderness  Abdominal: He exhibits no mass  There is no tenderness  There is no rebound  Musculoskeletal: He exhibits no edema or tenderness  Neurological: He is alert and oriented to person, place, and time  Skin: No rash noted  No erythema           Assessment/Plan:    Hypertension  Chronic uncontrolled per patient he did not the take his medication for the last couple days we discussed with the patient important compliant with the medication to avoid the complication and like heart attack and stroke  Will restart the the same medication including the nifedipine 30 mg metoprolol 25 mg  We discussed with the patient low-salt diet increased exercise and important lose weight    Nonalcoholic fatty liver disease  Chronic asymptomatic most probably secondary to obesity and the a diabetic his liver enzyme normal we encouraged patient to lose 10% of the weight and the care is diabetic and a control    Diabetes with ulcer of foot ([facility] )  Lab Results   Component Value Date    HGBA1C 5 5 11/28/2018     A chronic and secondary to the patient GFR declined we will stop the Marlinda Hu is a Will start Januvia  We discussed with the patient low carb diet important lose weight the will recommend to check his blood sugar number daily basis and keep log book will recheck in 3w      Stage 4 chronic kidney disease (HCC)  Increase in creatinine and decrease in the GFR  A patient and did not feel well he had a some diarrhea  Will stop Marlinda Hu it is will stop the Januvia  Also recommend to the patient to stop tramadol per patient he is not taking it  We discussed the patient the keep well hydration stay away from nonsteroidal anti-inflammatory drugs also stay away from the IV dye  Will recheck BMP in 2 week  Also recommended patient to call for appointment was his Nephrology    Other proteinuria  It can be multifactorial secondary to his diabetic and the blood pressure   Discussed with the patient important keep blood pressure under control and will keep his a blood the sugar under control       Diagnoses and all orders for this visit:    Type 2 diabetes mellitus with foot ulcer, with long-term current use of insulin ([facility] [address] )  -     Basic metabolic panel; Future    Essential hypertension  -     Basic metabolic panel; Future    Mixed hyperlipidemia  -     Basic metabolic panel; Future    Nonalcoholic fatty liver disease    Diabetic eye exam ([facility] [address] )    Need for hepatitis C screening test    Other proteinuria    Other orders  -     Cancel: Ambulatory referral to Ophthalmology; Future  -     Cancel: Hepatitis C antibody;  Future

## 2018-12-14 NOTE — ASSESSMENT & PLAN NOTE
Chronic asymptomatic most probably secondary to obesity and the a diabetic his liver enzyme normal we encouraged patient to lose 10% of the weight and the care is diabetic and a control

## 2018-12-14 NOTE — ASSESSMENT & PLAN NOTE
Chronic uncontrolled per patient he did not the take his medication for the last couple days we discussed with the patient important compliant with the medication to avoid the complication and like heart attack and stroke  Will restart the the same medication including the nifedipine 30 mg metoprolol 25 mg  We discussed with the patient low-salt diet increased exercise and important lose weight

## 2018-12-14 NOTE — ASSESSMENT & PLAN NOTE
Increase in creatinine and decrease in the GFR  A patient and did not feel well he had a some diarrhea  Will stop Brianda Tracy it is will stop the Januvia  Also recommend to the patient to stop tramadol per patient he is not taking it  We discussed the patient the keep well hydration stay away from nonsteroidal anti-inflammatory drugs also stay away from the IV dye  Will recheck BMP in 2 week  Also recommended patient to call for appointment was his Nephrology

## 2018-12-14 NOTE — ASSESSMENT & PLAN NOTE
It can be multifactorial secondary to his diabetic and the blood pressure   Discussed with the patient important keep blood pressure under control and will keep his a blood the sugar under control

## 2018-12-27 DIAGNOSIS — L97.509 TYPE 2 DIABETES MELLITUS WITH FOOT ULCER, WITH LONG-TERM CURRENT USE OF INSULIN (HCC): ICD-10-CM

## 2018-12-27 DIAGNOSIS — E11.621 TYPE 2 DIABETES MELLITUS WITH FOOT ULCER, WITH LONG-TERM CURRENT USE OF INSULIN (HCC): ICD-10-CM

## 2018-12-27 DIAGNOSIS — Z79.4 TYPE 2 DIABETES MELLITUS WITH FOOT ULCER, WITH LONG-TERM CURRENT USE OF INSULIN (HCC): ICD-10-CM

## 2018-12-27 RX ORDER — METOPROLOL SUCCINATE 25 MG/1
TABLET, EXTENDED RELEASE ORAL
Qty: 30 TABLET | Refills: 0 | Status: SHIPPED | OUTPATIENT
Start: 2018-12-27 | End: 2019-01-26 | Stop reason: SDUPTHER

## 2019-01-16 ENCOUNTER — APPOINTMENT (OUTPATIENT)
Dept: LAB | Facility: HOSPITAL | Age: 54
End: 2019-01-16
Payer: COMMERCIAL

## 2019-01-16 DIAGNOSIS — L97.509 TYPE 2 DIABETES MELLITUS WITH FOOT ULCER, WITH LONG-TERM CURRENT USE OF INSULIN (HCC): ICD-10-CM

## 2019-01-16 DIAGNOSIS — Z79.4 TYPE 2 DIABETES MELLITUS WITH FOOT ULCER, WITH LONG-TERM CURRENT USE OF INSULIN (HCC): ICD-10-CM

## 2019-01-16 DIAGNOSIS — I10 ESSENTIAL HYPERTENSION: ICD-10-CM

## 2019-01-16 DIAGNOSIS — E11.621 TYPE 2 DIABETES MELLITUS WITH FOOT ULCER, WITH LONG-TERM CURRENT USE OF INSULIN (HCC): ICD-10-CM

## 2019-01-16 DIAGNOSIS — E78.2 MIXED HYPERLIPIDEMIA: ICD-10-CM

## 2019-01-16 LAB
ANION GAP SERPL CALCULATED.3IONS-SCNC: 8 MMOL/L (ref 5–14)
BUN SERPL-MCNC: 30 MG/DL (ref 5–25)
CALCIUM SERPL-MCNC: 8.7 MG/DL (ref 8.4–10.2)
CHLORIDE SERPL-SCNC: 109 MMOL/L (ref 97–108)
CO2 SERPL-SCNC: 23 MMOL/L (ref 22–30)
CREAT SERPL-MCNC: 3.09 MG/DL (ref 0.7–1.5)
GFR SERPL CREATININE-BSD FRML MDRD: 25 ML/MIN/1.73SQ M
GLUCOSE P FAST SERPL-MCNC: 119 MG/DL (ref 70–99)
POTASSIUM SERPL-SCNC: 4.9 MMOL/L (ref 3.6–5)
SODIUM SERPL-SCNC: 140 MMOL/L (ref 137–147)

## 2019-01-16 PROCEDURE — 80048 BASIC METABOLIC PNL TOTAL CA: CPT

## 2019-01-16 PROCEDURE — 36415 COLL VENOUS BLD VENIPUNCTURE: CPT

## 2019-01-24 ENCOUNTER — OFFICE VISIT (OUTPATIENT)
Dept: FAMILY MEDICINE CLINIC | Facility: CLINIC | Age: 54
End: 2019-01-24
Payer: COMMERCIAL

## 2019-01-24 VITALS
SYSTOLIC BLOOD PRESSURE: 140 MMHG | OXYGEN SATURATION: 99 % | HEIGHT: 70 IN | WEIGHT: 281 LBS | HEART RATE: 72 BPM | BODY MASS INDEX: 40.23 KG/M2 | DIASTOLIC BLOOD PRESSURE: 80 MMHG | TEMPERATURE: 98.9 F

## 2019-01-24 DIAGNOSIS — N18.4 STAGE 4 CHRONIC KIDNEY DISEASE (HCC): ICD-10-CM

## 2019-01-24 DIAGNOSIS — E87.6 HYPOKALEMIA: ICD-10-CM

## 2019-01-24 DIAGNOSIS — E55.9 VITAMIN D DEFICIENCY: ICD-10-CM

## 2019-01-24 DIAGNOSIS — I10 ESSENTIAL HYPERTENSION: ICD-10-CM

## 2019-01-24 DIAGNOSIS — Z79.4 TYPE 2 DIABETES MELLITUS WITH FOOT ULCER, WITH LONG-TERM CURRENT USE OF INSULIN (HCC): ICD-10-CM

## 2019-01-24 DIAGNOSIS — I47.2 PAROXYSMAL VENTRICULAR TACHYCARDIA (HCC): Primary | ICD-10-CM

## 2019-01-24 DIAGNOSIS — E11.621 TYPE 2 DIABETES MELLITUS WITH FOOT ULCER, WITH LONG-TERM CURRENT USE OF INSULIN (HCC): ICD-10-CM

## 2019-01-24 DIAGNOSIS — Z11.59 NEED FOR HEPATITIS C SCREENING TEST: ICD-10-CM

## 2019-01-24 DIAGNOSIS — K76.0 NONALCOHOLIC FATTY LIVER DISEASE: ICD-10-CM

## 2019-01-24 DIAGNOSIS — L97.509 TYPE 2 DIABETES MELLITUS WITH FOOT ULCER, WITH LONG-TERM CURRENT USE OF INSULIN (HCC): ICD-10-CM

## 2019-01-24 DIAGNOSIS — G62.89 OTHER POLYNEUROPATHY: ICD-10-CM

## 2019-01-24 PROCEDURE — 99213 OFFICE O/P EST LOW 20 MIN: CPT | Performed by: FAMILY MEDICINE

## 2019-01-24 PROCEDURE — 1036F TOBACCO NON-USER: CPT | Performed by: FAMILY MEDICINE

## 2019-01-24 PROCEDURE — 3008F BODY MASS INDEX DOCD: CPT | Performed by: FAMILY MEDICINE

## 2019-01-24 PROCEDURE — 3066F NEPHROPATHY DOC TX: CPT | Performed by: FAMILY MEDICINE

## 2019-01-24 NOTE — PROGRESS NOTES
Subjective:   Chief Complaint   Patient presents with    Follow-up     chronic conditions        Patient ID: Radha Howard  is a 46 y o  male      Patient does follow up with a chronic condition patient was history of for proximal take ventricular tachycardia he is asymptomatic no chest pain or short of breast and no dizziness no light headache and heart rate is controlled patient on metoprolol 25 mg once a day the patient's history of hypertension and improved compared with the last visit asymptomatic no chest pain or short of breath no palpitation no headache no blurred vision no weakness or lateralized of the symptom patient and not compliant with his medication now he is just taking the metoprolol 25 mg and he stop all the rest of the medication for blood pressure patient's history of peripheral neuropathy most probably secondary to diabetic asymptomatic deny any numbness on his lower extremity patient does follow with the podiatric the patient was history of the chronic kidney disease stage of fall in asymptomatic no edema and no flank pain no blood in the urine recently adjustment on his medication repeat blood work which show improved in his creatinine and improve in his GFR        The following portions of the patient's history were reviewed and updated as appropriate: allergies, current medications, past family history, past medical history, past social history, past surgical history and problem list     Review of Systems      Objective:  Vitals:    01/24/19 1439   BP: 140/80   Pulse: 72   Temp: 98 9 °F (37 2 °C)   TempSrc: Oral   SpO2: 99%   Weight: 127 kg (281 lb)   Height: 5' 10" (1 778 m)      Physical Exam      Assessment/Plan:    Hypertension  Chronic asymptomatic better controlled compared with before patient he is just taking metoprolol XL 25 mg once a day we discussed with the patient important lose weight and low salt diet    Paroxysmal ventricular tachycardia (HCC)  Asymptomatic rate is controlled ,continue Metoprolol 25 mg po/day     Peripheral neuropathy  Chronic ,most probably 2 to diabetic ,patient does see Podiatric ,not on Gabapentin 2 to chronic kidney disease     Stage 4 chronic kidney disease (HCC)  Chronic Asymptomatic slight improve in GFR and in Creatinine , Avoid non steroid  anti-inflammatory drugs  Keep will hydration  Avoid contrast dye  Patient does followed by Nephrology       Diagnoses and all orders for this visit:    Paroxysmal ventricular tachycardia (HCC)    Other polyneuropathy    Essential hypertension  -     CBC and differential; Future  -     Comprehensive metabolic panel; Future  -     Lipid Panel with Direct LDL reflex; Future  -     TSH, 3rd generation with Free T4 reflex; Future  -     Hemoglobin A1C; Future  -     Microalbumin / creatinine urine ratio; Future    Stage 4 chronic kidney disease (HCC)  -     CBC and differential; Future  -     Comprehensive metabolic panel; Future  -     Lipid Panel with Direct LDL reflex; Future  -     TSH, 3rd generation with Free T4 reflex; Future  -     Hemoglobin A1C; Future  -     Microalbumin / creatinine urine ratio; Future    Need for hepatitis C screening test  -     Hepatitis C antibody; Future    Vitamin D deficiency  -     CBC and differential; Future  -     Comprehensive metabolic panel; Future  -     Lipid Panel with Direct LDL reflex; Future  -     TSH, 3rd generation with Free T4 reflex; Future  -     Hemoglobin A1C; Future  -     Microalbumin / creatinine urine ratio; Future    Type 2 diabetes mellitus with foot ulcer, with long-term current use of insulin (HCC)  -     CBC and differential; Future  -     Comprehensive metabolic panel; Future  -     Lipid Panel with Direct LDL reflex; Future  -     TSH, 3rd generation with Free T4 reflex; Future  -     Hemoglobin A1C; Future  -     Microalbumin / creatinine urine ratio;  Future    Nonalcoholic fatty liver disease  -     CBC and differential; Future  -     Comprehensive metabolic panel; Future  -     Lipid Panel with Direct LDL reflex; Future  -     TSH, 3rd generation with Free T4 reflex; Future  -     Hemoglobin A1C; Future  -     Microalbumin / creatinine urine ratio; Future    Hypokalemia  -     CBC and differential; Future  -     Comprehensive metabolic panel; Future  -     Lipid Panel with Direct LDL reflex; Future  -     TSH, 3rd generation with Free T4 reflex; Future  -     Hemoglobin A1C; Future  -     Microalbumin / creatinine urine ratio;  Future

## 2019-01-26 DIAGNOSIS — E11.621 TYPE 2 DIABETES MELLITUS WITH FOOT ULCER, WITH LONG-TERM CURRENT USE OF INSULIN (HCC): ICD-10-CM

## 2019-01-26 DIAGNOSIS — Z79.4 TYPE 2 DIABETES MELLITUS WITH FOOT ULCER, WITH LONG-TERM CURRENT USE OF INSULIN (HCC): ICD-10-CM

## 2019-01-26 DIAGNOSIS — L97.509 TYPE 2 DIABETES MELLITUS WITH FOOT ULCER, WITH LONG-TERM CURRENT USE OF INSULIN (HCC): ICD-10-CM

## 2019-01-26 RX ORDER — METOPROLOL SUCCINATE 25 MG/1
TABLET, EXTENDED RELEASE ORAL
Qty: 30 TABLET | Refills: 0 | Status: SHIPPED | OUTPATIENT
Start: 2019-01-26 | End: 2019-04-25 | Stop reason: ALTCHOICE

## 2019-01-27 PROBLEM — S98.131A TRAUMATIC AMPUTATION OF TOE OF RIGHT FOOT (HCC): Status: ACTIVE | Noted: 2019-01-27

## 2019-01-27 NOTE — ASSESSMENT & PLAN NOTE
Chronic Asymptomatic slight improve in GFR and in Creatinine , Avoid non steroid  anti-inflammatory drugs  Keep will hydration  Avoid contrast dye  Patient does followed by Nephrology

## 2019-01-27 NOTE — ASSESSMENT & PLAN NOTE
Chronic asymptomatic better controlled compared with before patient he is just taking metoprolol XL 25 mg once a day we discussed with the patient important lose weight and low salt diet

## 2019-01-27 NOTE — ASSESSMENT & PLAN NOTE
Chronic ,most probably 2 to diabetic ,patient does see Podiatric ,not on Gabapentin 2 to chronic kidney disease

## 2019-04-11 ENCOUNTER — APPOINTMENT (OUTPATIENT)
Dept: LAB | Facility: HOSPITAL | Age: 54
End: 2019-04-11
Payer: COMMERCIAL

## 2019-04-11 ENCOUNTER — TRANSCRIBE ORDERS (OUTPATIENT)
Dept: ADMINISTRATIVE | Facility: HOSPITAL | Age: 54
End: 2019-04-11

## 2019-04-11 DIAGNOSIS — R80.9 PROTEINURIA, UNSPECIFIED TYPE: ICD-10-CM

## 2019-04-11 DIAGNOSIS — N17.9 ACUTE RENAL FAILURE, UNSPECIFIED ACUTE RENAL FAILURE TYPE (HCC): ICD-10-CM

## 2019-04-11 DIAGNOSIS — R31.9 HEMATURIA, UNSPECIFIED TYPE: ICD-10-CM

## 2019-04-11 DIAGNOSIS — N18.30 CHRONIC KIDNEY DISEASE, STAGE III (MODERATE) (HCC): ICD-10-CM

## 2019-04-11 DIAGNOSIS — R94.4 DECREASED GFR: ICD-10-CM

## 2019-04-11 DIAGNOSIS — R94.4 DECREASED GFR: Primary | ICD-10-CM

## 2019-04-11 DIAGNOSIS — I10 HYPERTENSION, ESSENTIAL: ICD-10-CM

## 2019-04-12 ENCOUNTER — TELEPHONE (OUTPATIENT)
Dept: FAMILY MEDICINE CLINIC | Facility: CLINIC | Age: 54
End: 2019-04-12

## 2019-04-25 ENCOUNTER — OFFICE VISIT (OUTPATIENT)
Dept: FAMILY MEDICINE CLINIC | Facility: CLINIC | Age: 54
End: 2019-04-25
Payer: COMMERCIAL

## 2019-04-25 VITALS
TEMPERATURE: 97 F | SYSTOLIC BLOOD PRESSURE: 140 MMHG | BODY MASS INDEX: 39.2 KG/M2 | HEART RATE: 62 BPM | HEIGHT: 71 IN | OXYGEN SATURATION: 98 % | DIASTOLIC BLOOD PRESSURE: 90 MMHG | WEIGHT: 280 LBS

## 2019-04-25 DIAGNOSIS — F33.0 MILD EPISODE OF RECURRENT MAJOR DEPRESSIVE DISORDER (HCC): ICD-10-CM

## 2019-04-25 DIAGNOSIS — N18.4 STAGE 4 CHRONIC KIDNEY DISEASE (HCC): ICD-10-CM

## 2019-04-25 DIAGNOSIS — L97.509 TYPE 2 DIABETES MELLITUS WITH FOOT ULCER, WITH LONG-TERM CURRENT USE OF INSULIN (HCC): Primary | ICD-10-CM

## 2019-04-25 DIAGNOSIS — R80.8 OTHER PROTEINURIA: ICD-10-CM

## 2019-04-25 DIAGNOSIS — Z95.810 HISTORY OF IMPLANTABLE CARDIOVERTER-DEFIBRILLATOR (ICD) PLACEMENT: ICD-10-CM

## 2019-04-25 DIAGNOSIS — E78.2 MIXED HYPERLIPIDEMIA: ICD-10-CM

## 2019-04-25 DIAGNOSIS — Z79.4 TYPE 2 DIABETES MELLITUS WITH FOOT ULCER, WITH LONG-TERM CURRENT USE OF INSULIN (HCC): Primary | ICD-10-CM

## 2019-04-25 DIAGNOSIS — I10 ESSENTIAL HYPERTENSION: ICD-10-CM

## 2019-04-25 DIAGNOSIS — E11.621 TYPE 2 DIABETES MELLITUS WITH FOOT ULCER, WITH LONG-TERM CURRENT USE OF INSULIN (HCC): Primary | ICD-10-CM

## 2019-04-25 DIAGNOSIS — I47.2 VENTRICULAR TACHYCARDIA, INDUCIBLE (HCC): ICD-10-CM

## 2019-04-25 PROBLEM — M86.9 TOE OSTEOMYELITIS, RIGHT (HCC): Status: RESOLVED | Noted: 2018-01-17 | Resolved: 2019-04-25

## 2019-04-25 PROCEDURE — 3066F NEPHROPATHY DOC TX: CPT | Performed by: FAMILY MEDICINE

## 2019-04-25 PROCEDURE — 3008F BODY MASS INDEX DOCD: CPT | Performed by: FAMILY MEDICINE

## 2019-04-25 PROCEDURE — 99214 OFFICE O/P EST MOD 30 MIN: CPT | Performed by: FAMILY MEDICINE

## 2019-05-01 ENCOUNTER — TELEPHONE (OUTPATIENT)
Dept: FAMILY MEDICINE CLINIC | Facility: CLINIC | Age: 54
End: 2019-05-01

## 2019-05-01 DIAGNOSIS — E78.2 MIXED HYPERLIPIDEMIA: Primary | ICD-10-CM

## 2019-05-01 RX ORDER — SIMVASTATIN 10 MG
10 TABLET ORAL
Qty: 30 TABLET | Refills: 2 | Status: SHIPPED | OUTPATIENT
Start: 2019-05-01 | End: 2019-08-08 | Stop reason: SDUPTHER

## 2019-06-05 ENCOUNTER — APPOINTMENT (OUTPATIENT)
Dept: LAB | Facility: HOSPITAL | Age: 54
End: 2019-06-05
Payer: COMMERCIAL

## 2019-06-05 ENCOUNTER — TRANSCRIBE ORDERS (OUTPATIENT)
Dept: ADMINISTRATIVE | Facility: HOSPITAL | Age: 54
End: 2019-06-05

## 2019-06-05 DIAGNOSIS — N28.9 ACUTE RENAL INSUFFICIENCY: ICD-10-CM

## 2019-06-05 DIAGNOSIS — N28.9 ACUTE RENAL INSUFFICIENCY: Primary | ICD-10-CM

## 2019-06-05 LAB
ALBUMIN SERPL BCP-MCNC: 3.5 G/DL (ref 3–5.2)
ANION GAP SERPL CALCULATED.3IONS-SCNC: 8 MMOL/L (ref 5–14)
APTT PPP: 28 SECONDS (ref 23–34)
BACTERIA UR QL AUTO: ABNORMAL /HPF
BASOPHILS # BLD AUTO: 0.1 THOUSANDS/ΜL (ref 0–0.1)
BASOPHILS NFR BLD AUTO: 1 % (ref 0–1)
BILIRUB UR QL STRIP: NEGATIVE
BUN SERPL-MCNC: 31 MG/DL (ref 5–25)
CALCIUM SERPL-MCNC: 7.4 MG/DL (ref 8.4–10.2)
CHLORIDE SERPL-SCNC: 107 MMOL/L (ref 97–108)
CLARITY UR: CLEAR
CO2 SERPL-SCNC: 24 MMOL/L (ref 22–30)
COLOR UR: ABNORMAL
CREAT SERPL-MCNC: 4.19 MG/DL (ref 0.7–1.5)
CREAT UR-MCNC: 122 MG/DL
EOSINOPHIL # BLD AUTO: 0.7 THOUSAND/ΜL (ref 0–0.4)
EOSINOPHIL NFR BLD AUTO: 7 % (ref 0–6)
ERYTHROCYTE [DISTWIDTH] IN BLOOD BY AUTOMATED COUNT: 13.1 %
GFR SERPL CREATININE-BSD FRML MDRD: 17 ML/MIN/1.73SQ M
GLUCOSE P FAST SERPL-MCNC: 184 MG/DL (ref 70–99)
GLUCOSE UR STRIP-MCNC: ABNORMAL MG/DL
HCT VFR BLD AUTO: 37.6 % (ref 41–53)
HGB BLD-MCNC: 12.6 G/DL (ref 13.5–17.5)
HGB UR QL STRIP.AUTO: 50
HYALINE CASTS #/AREA URNS LPF: ABNORMAL /LPF
INR PPP: 0.93 (ref 0.89–1.1)
KETONES UR STRIP-MCNC: NEGATIVE MG/DL
LEUKOCYTE ESTERASE UR QL STRIP: NEGATIVE
LYMPHOCYTES # BLD AUTO: 3 THOUSANDS/ΜL (ref 0.5–4)
LYMPHOCYTES NFR BLD AUTO: 32 % (ref 25–45)
MCH RBC QN AUTO: 33.5 PG (ref 26–34)
MCHC RBC AUTO-ENTMCNC: 33.6 G/DL (ref 31–36)
MCV RBC AUTO: 100 FL (ref 80–100)
MONOCYTES # BLD AUTO: 0.9 THOUSAND/ΜL (ref 0.2–0.9)
MONOCYTES NFR BLD AUTO: 9 % (ref 1–10)
NEUTROPHILS # BLD AUTO: 4.8 THOUSANDS/ΜL (ref 1.8–7.8)
NEUTS SEG NFR BLD AUTO: 51 % (ref 45–65)
NITRITE UR QL STRIP: NEGATIVE
NON-SQ EPI CELLS URNS QL MICRO: ABNORMAL /HPF
PH UR STRIP.AUTO: 6 [PH]
PHOSPHATE SERPL-MCNC: 3.4 MG/DL (ref 2.5–4.8)
PLATELET # BLD AUTO: 286 THOUSANDS/UL (ref 150–450)
PMV BLD AUTO: 9.6 FL (ref 8.9–12.7)
POTASSIUM SERPL-SCNC: 4.4 MMOL/L (ref 3.6–5)
PROT UR STRIP-MCNC: >=500 MG/DL
PROT UR-MCNC: 1037 MG/DL
PROT/CREAT UR: 8.5 MG/G{CREAT} (ref 0–0.1)
PROTHROMBIN TIME: 9.9 SECONDS (ref 9.5–11.6)
RBC # BLD AUTO: 3.78 MILLION/UL (ref 4.5–5.9)
RBC #/AREA URNS AUTO: ABNORMAL /HPF
SODIUM SERPL-SCNC: 139 MMOL/L (ref 137–147)
SP GR UR STRIP.AUTO: 1.01 (ref 1–1.04)
UROBILINOGEN UA: NEGATIVE MG/DL
WBC # BLD AUTO: 9.4 THOUSAND/UL (ref 4.5–11)
WBC #/AREA URNS AUTO: ABNORMAL /HPF

## 2019-06-05 PROCEDURE — 87389 HIV-1 AG W/HIV-1&-2 AB AG IA: CPT

## 2019-06-05 PROCEDURE — 36415 COLL VENOUS BLD VENIPUNCTURE: CPT

## 2019-06-05 PROCEDURE — 86706 HEP B SURFACE ANTIBODY: CPT

## 2019-06-05 PROCEDURE — 86705 HEP B CORE ANTIBODY IGM: CPT

## 2019-06-05 PROCEDURE — 84165 PROTEIN E-PHORESIS SERUM: CPT

## 2019-06-05 PROCEDURE — 80069 RENAL FUNCTION PANEL: CPT

## 2019-06-05 PROCEDURE — 84165 PROTEIN E-PHORESIS SERUM: CPT | Performed by: PATHOLOGY

## 2019-06-05 PROCEDURE — 81001 URINALYSIS AUTO W/SCOPE: CPT | Performed by: INTERNAL MEDICINE

## 2019-06-05 PROCEDURE — 82570 ASSAY OF URINE CREATININE: CPT | Performed by: INTERNAL MEDICINE

## 2019-06-05 PROCEDURE — 87340 HEPATITIS B SURFACE AG IA: CPT

## 2019-06-05 PROCEDURE — 84166 PROTEIN E-PHORESIS/URINE/CSF: CPT | Performed by: PATHOLOGY

## 2019-06-05 PROCEDURE — 86803 HEPATITIS C AB TEST: CPT

## 2019-06-05 PROCEDURE — 84166 PROTEIN E-PHORESIS/URINE/CSF: CPT | Performed by: INTERNAL MEDICINE

## 2019-06-05 PROCEDURE — 85610 PROTHROMBIN TIME: CPT

## 2019-06-05 PROCEDURE — 85730 THROMBOPLASTIN TIME PARTIAL: CPT

## 2019-06-05 PROCEDURE — 85025 COMPLETE CBC W/AUTO DIFF WBC: CPT

## 2019-06-05 PROCEDURE — 84156 ASSAY OF PROTEIN URINE: CPT | Performed by: INTERNAL MEDICINE

## 2019-06-06 LAB
HBV CORE IGM SER QL: NORMAL
HBV SURFACE AB SER-ACNC: 3.58 MIU/ML
HBV SURFACE AG SER QL: NORMAL
HCV AB SER QL: NORMAL

## 2019-06-07 LAB
ALBUMIN SERPL ELPH-MCNC: 3.29 G/DL (ref 3.5–5)
ALBUMIN SERPL ELPH-MCNC: 48.4 % (ref 52–65)
ALBUMIN UR ELPH-MCNC: 71 %
ALPHA1 GLOB MFR UR ELPH: 4.8 %
ALPHA1 GLOB SERPL ELPH-MCNC: 0.33 G/DL (ref 0.1–0.4)
ALPHA1 GLOB SERPL ELPH-MCNC: 4.9 % (ref 2.5–5)
ALPHA2 GLOB MFR UR ELPH: 5 %
ALPHA2 GLOB SERPL ELPH-MCNC: 1.1 G/DL (ref 0.4–1.2)
ALPHA2 GLOB SERPL ELPH-MCNC: 16.2 % (ref 7–13)
B-GLOBULIN MFR UR ELPH: 7 %
BETA GLOB ABNORMAL SERPL ELPH-MCNC: 0.38 G/DL (ref 0.4–0.8)
BETA1 GLOB SERPL ELPH-MCNC: 5.6 % (ref 5–13)
BETA2 GLOB SERPL ELPH-MCNC: 7.3 % (ref 2–8)
BETA2+GAMMA GLOB SERPL ELPH-MCNC: 0.5 G/DL (ref 0.2–0.5)
GAMMA GLOB ABNORMAL SERPL ELPH-MCNC: 1.2 G/DL (ref 0.5–1.6)
GAMMA GLOB MFR UR ELPH: 12.2 %
GAMMA GLOB SERPL ELPH-MCNC: 17.6 % (ref 12–22)
HIV 1+2 AB+HIV1 P24 AG SERPL QL IA: NORMAL
IGG/ALB SER: 0.94 {RATIO} (ref 1.1–1.8)
PROT PATTERN SERPL ELPH-IMP: ABNORMAL
PROT PATTERN UR ELPH-IMP: ABNORMAL
PROT SERPL-MCNC: 6.8 G/DL (ref 6.4–8.2)
PROT UR-MCNC: 1018 MG/DL

## 2019-06-10 ENCOUNTER — APPOINTMENT (OUTPATIENT)
Dept: LAB | Facility: HOSPITAL | Age: 54
End: 2019-06-10
Payer: COMMERCIAL

## 2019-06-10 DIAGNOSIS — N28.9 ACUTE RENAL INSUFFICIENCY: ICD-10-CM

## 2019-06-10 PROCEDURE — 83883 ASSAY NEPHELOMETRY NOT SPEC: CPT

## 2019-06-13 LAB
KAPPA LC UR-MCNC: 589 MG/L (ref 1.35–24.19)
KAPPA LC/LAMBDA UR: 8.37 {RATIO} (ref 2.04–10.37)
LAMBDA LC UR-MCNC: 70.4 MG/L (ref 0.24–6.66)

## 2019-06-18 ENCOUNTER — TELEPHONE (OUTPATIENT)
Dept: FAMILY MEDICINE CLINIC | Facility: CLINIC | Age: 54
End: 2019-06-18

## 2019-07-15 ENCOUNTER — TRANSCRIBE ORDERS (OUTPATIENT)
Dept: ADMINISTRATIVE | Facility: HOSPITAL | Age: 54
End: 2019-07-15

## 2019-07-15 ENCOUNTER — APPOINTMENT (OUTPATIENT)
Dept: LAB | Facility: HOSPITAL | Age: 54
End: 2019-07-15
Payer: COMMERCIAL

## 2019-07-15 DIAGNOSIS — N28.9 KIDNEY INSUFFICIENCY: Primary | ICD-10-CM

## 2019-07-15 DIAGNOSIS — N28.9 KIDNEY INSUFFICIENCY: ICD-10-CM

## 2019-07-15 LAB
ALBUMIN SERPL BCP-MCNC: 3.9 G/DL (ref 3–5.2)
ANION GAP SERPL CALCULATED.3IONS-SCNC: 7 MMOL/L (ref 5–14)
APTT PPP: 28 SECONDS (ref 23–34)
BASOPHILS # BLD AUTO: 0.1 THOUSANDS/ΜL (ref 0–0.1)
BASOPHILS NFR BLD AUTO: 2 % (ref 0–1)
BUN SERPL-MCNC: 38 MG/DL (ref 5–25)
CALCIUM SERPL-MCNC: 8.7 MG/DL (ref 8.4–10.2)
CHLORIDE SERPL-SCNC: 109 MMOL/L (ref 97–108)
CO2 SERPL-SCNC: 26 MMOL/L (ref 22–30)
CREAT SERPL-MCNC: 4.84 MG/DL (ref 0.7–1.5)
EOSINOPHIL # BLD AUTO: 0.6 THOUSAND/ΜL (ref 0–0.4)
EOSINOPHIL NFR BLD AUTO: 7 % (ref 0–6)
ERYTHROCYTE [DISTWIDTH] IN BLOOD BY AUTOMATED COUNT: 13.9 %
GFR SERPL CREATININE-BSD FRML MDRD: 14 ML/MIN/1.73SQ M
GLUCOSE SERPL-MCNC: 141 MG/DL (ref 70–99)
HCT VFR BLD AUTO: 37.1 % (ref 41–53)
HGB BLD-MCNC: 12.6 G/DL (ref 13.5–17.5)
INR PPP: 0.94 (ref 0.89–1.1)
LYMPHOCYTES # BLD AUTO: 2.7 THOUSANDS/ΜL (ref 0.5–4)
LYMPHOCYTES NFR BLD AUTO: 32 % (ref 25–45)
MCH RBC QN AUTO: 33 PG (ref 26–34)
MCHC RBC AUTO-ENTMCNC: 34.1 G/DL (ref 31–36)
MCV RBC AUTO: 97 FL (ref 80–100)
MONOCYTES # BLD AUTO: 0.7 THOUSAND/ΜL (ref 0.2–0.9)
MONOCYTES NFR BLD AUTO: 8 % (ref 1–10)
NEUTROPHILS # BLD AUTO: 4.4 THOUSANDS/ΜL (ref 1.8–7.8)
NEUTS SEG NFR BLD AUTO: 52 % (ref 45–65)
PHOSPHATE SERPL-MCNC: 3.8 MG/DL (ref 2.5–4.8)
PLATELET # BLD AUTO: 312 THOUSANDS/UL (ref 150–450)
PMV BLD AUTO: 8.4 FL (ref 8.9–12.7)
POTASSIUM SERPL-SCNC: 5 MMOL/L (ref 3.6–5)
PROTHROMBIN TIME: 10 SECONDS (ref 9.5–11.6)
RBC # BLD AUTO: 3.82 MILLION/UL (ref 4.5–5.9)
SODIUM SERPL-SCNC: 142 MMOL/L (ref 137–147)
WBC # BLD AUTO: 8.5 THOUSAND/UL (ref 4.5–11)

## 2019-07-15 PROCEDURE — 80069 RENAL FUNCTION PANEL: CPT

## 2019-07-15 PROCEDURE — 85025 COMPLETE CBC W/AUTO DIFF WBC: CPT

## 2019-07-15 PROCEDURE — 85610 PROTHROMBIN TIME: CPT

## 2019-07-15 PROCEDURE — 85730 THROMBOPLASTIN TIME PARTIAL: CPT

## 2019-07-15 PROCEDURE — 36415 COLL VENOUS BLD VENIPUNCTURE: CPT

## 2019-07-29 ENCOUNTER — APPOINTMENT (OUTPATIENT)
Dept: LAB | Facility: HOSPITAL | Age: 54
End: 2019-07-29
Payer: COMMERCIAL

## 2019-07-29 DIAGNOSIS — E78.2 MIXED HYPERLIPIDEMIA: ICD-10-CM

## 2019-07-29 DIAGNOSIS — E11.621 TYPE 2 DIABETES MELLITUS WITH FOOT ULCER, WITH LONG-TERM CURRENT USE OF INSULIN (HCC): ICD-10-CM

## 2019-07-29 DIAGNOSIS — F33.0 MILD EPISODE OF RECURRENT MAJOR DEPRESSIVE DISORDER (HCC): ICD-10-CM

## 2019-07-29 DIAGNOSIS — L97.509 TYPE 2 DIABETES MELLITUS WITH FOOT ULCER, WITH LONG-TERM CURRENT USE OF INSULIN (HCC): ICD-10-CM

## 2019-07-29 DIAGNOSIS — R80.8 OTHER PROTEINURIA: ICD-10-CM

## 2019-07-29 DIAGNOSIS — N18.4 STAGE 4 CHRONIC KIDNEY DISEASE (HCC): ICD-10-CM

## 2019-07-29 DIAGNOSIS — Z79.4 TYPE 2 DIABETES MELLITUS WITH FOOT ULCER, WITH LONG-TERM CURRENT USE OF INSULIN (HCC): ICD-10-CM

## 2019-07-29 LAB
ANION GAP SERPL CALCULATED.3IONS-SCNC: 6 MMOL/L (ref 5–14)
BASOPHILS # BLD AUTO: 0.1 THOUSANDS/ΜL (ref 0–0.1)
BASOPHILS NFR BLD AUTO: 1 % (ref 0–1)
BUN SERPL-MCNC: 42 MG/DL (ref 5–25)
CALCIUM SERPL-MCNC: 8.3 MG/DL (ref 8.4–10.2)
CHLORIDE SERPL-SCNC: 108 MMOL/L (ref 97–108)
CHOLEST SERPL-MCNC: 302 MG/DL
CO2 SERPL-SCNC: 25 MMOL/L (ref 22–30)
CREAT SERPL-MCNC: 5.44 MG/DL (ref 0.7–1.5)
EOSINOPHIL # BLD AUTO: 0.5 THOUSAND/ΜL (ref 0–0.4)
EOSINOPHIL NFR BLD AUTO: 8 % (ref 0–6)
ERYTHROCYTE [DISTWIDTH] IN BLOOD BY AUTOMATED COUNT: 14.1 %
EST. AVERAGE GLUCOSE BLD GHB EST-MCNC: 117 MG/DL
GFR SERPL CREATININE-BSD FRML MDRD: 13 ML/MIN/1.73SQ M
GLUCOSE P FAST SERPL-MCNC: 116 MG/DL (ref 70–99)
HBA1C MFR BLD: 5.7 % (ref 4.2–6.3)
HCT VFR BLD AUTO: 36 % (ref 41–53)
HDLC SERPL-MCNC: 26 MG/DL (ref 40–59)
HGB BLD-MCNC: 12.3 G/DL (ref 13.5–17.5)
LDLC SERPL CALC-MCNC: 244 MG/DL
LYMPHOCYTES # BLD AUTO: 2.5 THOUSANDS/ΜL (ref 0.5–4)
LYMPHOCYTES NFR BLD AUTO: 37 % (ref 25–45)
MCH RBC QN AUTO: 32.7 PG (ref 26–34)
MCHC RBC AUTO-ENTMCNC: 34.2 G/DL (ref 31–36)
MCV RBC AUTO: 96 FL (ref 80–100)
MONOCYTES # BLD AUTO: 0.6 THOUSAND/ΜL (ref 0.2–0.9)
MONOCYTES NFR BLD AUTO: 9 % (ref 1–10)
NEUTROPHILS # BLD AUTO: 3 THOUSANDS/ΜL (ref 1.8–7.8)
NEUTS SEG NFR BLD AUTO: 45 % (ref 45–65)
PLATELET # BLD AUTO: 295 THOUSANDS/UL (ref 150–450)
PMV BLD AUTO: 8.6 FL (ref 8.9–12.7)
POTASSIUM SERPL-SCNC: 5 MMOL/L (ref 3.6–5)
RBC # BLD AUTO: 3.77 MILLION/UL (ref 4.5–5.9)
SODIUM SERPL-SCNC: 139 MMOL/L (ref 137–147)
TRIGL SERPL-MCNC: 161 MG/DL
TSH SERPL DL<=0.05 MIU/L-ACNC: 1.22 UIU/ML (ref 0.47–4.68)
WBC # BLD AUTO: 6.7 THOUSAND/UL (ref 4.5–11)

## 2019-07-29 PROCEDURE — 84443 ASSAY THYROID STIM HORMONE: CPT

## 2019-07-29 PROCEDURE — 36415 COLL VENOUS BLD VENIPUNCTURE: CPT

## 2019-07-29 PROCEDURE — 83036 HEMOGLOBIN GLYCOSYLATED A1C: CPT

## 2019-07-29 PROCEDURE — 80061 LIPID PANEL: CPT

## 2019-07-29 PROCEDURE — 80048 BASIC METABOLIC PNL TOTAL CA: CPT

## 2019-07-29 PROCEDURE — 85025 COMPLETE CBC W/AUTO DIFF WBC: CPT

## 2019-08-08 ENCOUNTER — OFFICE VISIT (OUTPATIENT)
Dept: FAMILY MEDICINE CLINIC | Facility: CLINIC | Age: 54
End: 2019-08-08
Payer: COMMERCIAL

## 2019-08-08 VITALS
HEIGHT: 71 IN | SYSTOLIC BLOOD PRESSURE: 140 MMHG | DIASTOLIC BLOOD PRESSURE: 80 MMHG | TEMPERATURE: 98.7 F | WEIGHT: 272 LBS | BODY MASS INDEX: 38.08 KG/M2 | OXYGEN SATURATION: 99 % | HEART RATE: 67 BPM

## 2019-08-08 DIAGNOSIS — E78.2 MIXED HYPERLIPIDEMIA: ICD-10-CM

## 2019-08-08 DIAGNOSIS — I10 ESSENTIAL HYPERTENSION: Primary | ICD-10-CM

## 2019-08-08 DIAGNOSIS — E11.621 TYPE 2 DIABETES MELLITUS WITH FOOT ULCER, WITH LONG-TERM CURRENT USE OF INSULIN (HCC): ICD-10-CM

## 2019-08-08 DIAGNOSIS — R80.8 OTHER PROTEINURIA: ICD-10-CM

## 2019-08-08 DIAGNOSIS — Z79.4 TYPE 2 DIABETES MELLITUS WITH FOOT ULCER, WITH LONG-TERM CURRENT USE OF INSULIN (HCC): ICD-10-CM

## 2019-08-08 DIAGNOSIS — L97.509 TYPE 2 DIABETES MELLITUS WITH FOOT ULCER, WITH LONG-TERM CURRENT USE OF INSULIN (HCC): ICD-10-CM

## 2019-08-08 PROCEDURE — 3008F BODY MASS INDEX DOCD: CPT | Performed by: FAMILY MEDICINE

## 2019-08-08 PROCEDURE — 99214 OFFICE O/P EST MOD 30 MIN: CPT | Performed by: FAMILY MEDICINE

## 2019-08-08 PROCEDURE — 1036F TOBACCO NON-USER: CPT | Performed by: FAMILY MEDICINE

## 2019-08-08 RX ORDER — PRAVASTATIN SODIUM 40 MG
40 TABLET ORAL DAILY
Qty: 30 TABLET | Refills: 2 | Status: SHIPPED | OUTPATIENT
Start: 2019-08-08 | End: 2019-10-18 | Stop reason: SINTOL

## 2019-08-08 RX ORDER — NIFEDIPINE 30 MG/1
TABLET, FILM COATED, EXTENDED RELEASE ORAL
Refills: 0 | COMMUNITY
Start: 2019-08-07 | End: 2019-10-18 | Stop reason: SINTOL

## 2019-08-08 NOTE — PROGRESS NOTES
Subjective:   Chief Complaint   Patient presents with    Follow-up     chronic conditions        Patient ID: Rebecca Gonzalez  is a 53 y o  male  Patient here follow-up with a chronic condition patient was history of hypertension fair control on current medication tolerated well without side effect deny any chest pain short of breath no palpitation no dyspnea on exertion no lower extremity edema patient was history of hyperlipidemia he stop taking his simvastatin and per patient he has some muscle spasm the patient deny any chest pain short of breath no palpitation no TIA patient's history of non-insulin-dependent diabetic hemoglobin A1c will control try to controlled it was low-the carb diet deny increased thirsty increased frequency urination no dizziness and no headache the patient and on the he developed ulcer on the bottom of his 3rd toe patient has been follow up with the podiatric for that patient's history of chronic kidney disease stage 4 and he had the acute kidney failure been seen by Nephrology who is who recommend kidney transplant  Recent blood work discussed with the patient the      The following portions of the patient's history were reviewed and updated as appropriate: allergies, current medications, past family history, past medical history, past social history, past surgical history and problem list     Review of Systems   Constitutional: Negative for fatigue and fever  HENT: Negative for ear pain, sinus pressure, sinus pain and sore throat  Eyes: Negative for pain and redness  Respiratory: Negative for cough, chest tightness and shortness of breath  Cardiovascular: Negative for chest pain, palpitations and leg swelling  Gastrointestinal: Negative for abdominal pain, blood in stool, constipation, diarrhea and nausea  Genitourinary: Negative for flank pain, frequency and hematuria  Musculoskeletal: Negative for back pain and joint swelling  Skin: Negative for rash  Neurological: Negative for dizziness, numbness and headaches  Hematological: Does not bruise/bleed easily  Objective:  Vitals:    08/08/19 1440   BP: 140/80   Pulse: 67   Temp: 98 7 °F (37 1 °C)   TempSrc: Tympanic   SpO2: 99%   Weight: 123 kg (272 lb)   Height: 5' 11" (1 803 m)      Physical Exam   Constitutional: He is oriented to person, place, and time  He appears well-developed and well-nourished  HENT:   Head: Normocephalic  Right Ear: External ear normal    Left Ear: External ear normal    Eyes: Conjunctivae and EOM are normal  Right eye exhibits no discharge  Left eye exhibits no discharge  Neck: No JVD present  Cardiovascular: Normal rate and regular rhythm  Exam reveals no gallop  Murmur heard  Pulmonary/Chest: Effort normal  No respiratory distress  He has no wheezes  He has no rales  He exhibits no tenderness  Abdominal: He exhibits no mass  There is no tenderness  There is no rebound  Musculoskeletal: He exhibits no edema or tenderness  Neurological: He is alert and oriented to person, place, and time  Skin: No rash noted  No erythema         Assessment/Plan:    Diabetes with ulcer of foot ([facility] [address] )  Lab Results   Component Value Date    HGBA1C 5 7 07/29/2019     Chronic asymptomatic fair control was in low carb diet discussed the patient important lose weight and follow up with the low carb diet patient currently follow-up with the podiatric for ulcer on his right foot    Hypertension  Chronic asymptomatic fair control continue current management encouraged patient to lose weight and follow up with the low salt diet    Hyperlipidemia  The a chronic uncontrolled restart the patient on pravastatin 40 mg once a day proper use of medication possible side effect discussed with the patient recommend low-fat diet important lose weight    Other proteinuria  Multifactorial including diabetic and had obesity chronic kidney disease discussed the patient important lose weight , patient does follow up with the Nephrology    Stage 4 chronic kidney disease ([facility] [place] [address] )  Chronic uncontrolled patient does follow up with the Nephrology  possible kidney transplant  Avoid non steroid  anti-inflammatory drugs  Keep will hydration  Avoid contrast dye       Diagnoses and all orders for this visit:    Essential hypertension  -     CBC and differential; Future  -     Comprehensive metabolic panel; Future  -     Lipid Panel with Direct LDL reflex; Future  -     TSH, 3rd generation with Free T4 reflex; Future  -     Hemoglobin A1C; Future  -     Microalbumin / creatinine urine ratio    Type 2 diabetes mellitus with foot ulcer, with long-term current use of insulin (HCC)  -     pravastatin (PRAVACHOL) 40 mg tablet; Take 1 tablet (40 mg total) by mouth daily  -     CBC and differential; Future  -     Comprehensive metabolic panel; Future  -     Lipid Panel with Direct LDL reflex; Future  -     TSH, 3rd generation with Free T4 reflex; Future  -     Hemoglobin A1C; Future  -     Microalbumin / creatinine urine ratio    Mixed hyperlipidemia  -     CBC and differential; Future  -     Comprehensive metabolic panel; Future  -     Lipid Panel with Direct LDL reflex; Future  -     TSH, 3rd generation with Free T4 reflex; Future  -     Hemoglobin A1C; Future  -     Microalbumin / creatinine urine ratio    Other proteinuria  -     CBC and differential; Future  -     Comprehensive metabolic panel; Future  -     Lipid Panel with Direct LDL reflex; Future  -     TSH, 3rd generation with Free T4 reflex;  Future  -     Hemoglobin A1C; Future  -     Microalbumin / creatinine urine ratio    Other orders  -     NIFEdipine ER (ADALAT CC) 30 MG 24 hr tablet

## 2019-08-11 PROBLEM — R31.21 ASYMPTOMATIC MICROSCOPIC HEMATURIA: Status: ACTIVE | Noted: 2019-06-03

## 2019-08-12 NOTE — ASSESSMENT & PLAN NOTE
The a chronic uncontrolled restart the patient on pravastatin 40 mg once a day proper use of medication possible side effect discussed with the patient recommend low-fat diet important lose weight

## 2019-08-12 NOTE — ASSESSMENT & PLAN NOTE
Lab Results   Component Value Date    HGBA1C 5 7 07/29/2019     Chronic asymptomatic fair control was in low carb diet discussed the patient important lose weight and follow up with the low carb diet patient currently follow-up with the podiatric for ulcer on his right foot

## 2019-08-12 NOTE — ASSESSMENT & PLAN NOTE
Multifactorial including diabetic and had obesity chronic kidney disease discussed the patient important lose weight , patient does follow up with the Nephrology

## 2019-08-12 NOTE — ASSESSMENT & PLAN NOTE
Chronic uncontrolled patient does follow up with the Nephrology  possible kidney transplant  Avoid non steroid  anti-inflammatory drugs  Keep will hydration  Avoid contrast dye

## 2019-08-12 NOTE — ASSESSMENT & PLAN NOTE
Chronic asymptomatic fair control continue current management encouraged patient to lose weight and follow up with the low salt diet

## 2019-09-03 ENCOUNTER — APPOINTMENT (OUTPATIENT)
Dept: LAB | Facility: HOSPITAL | Age: 54
End: 2019-09-03
Payer: COMMERCIAL

## 2019-09-03 ENCOUNTER — TRANSCRIBE ORDERS (OUTPATIENT)
Dept: ADMINISTRATIVE | Facility: HOSPITAL | Age: 54
End: 2019-09-03

## 2019-09-03 DIAGNOSIS — N28.9 ACUTE RENAL INSUFFICIENCY: ICD-10-CM

## 2019-09-03 DIAGNOSIS — E11.621 TYPE 2 DIABETES MELLITUS WITH FOOT ULCER, WITH LONG-TERM CURRENT USE OF INSULIN (HCC): ICD-10-CM

## 2019-09-03 DIAGNOSIS — L97.509 TYPE 2 DIABETES MELLITUS WITH FOOT ULCER, WITH LONG-TERM CURRENT USE OF INSULIN (HCC): ICD-10-CM

## 2019-09-03 DIAGNOSIS — Z79.4 TYPE 2 DIABETES MELLITUS WITH FOOT ULCER, WITH LONG-TERM CURRENT USE OF INSULIN (HCC): ICD-10-CM

## 2019-09-03 DIAGNOSIS — E78.2 MIXED HYPERLIPIDEMIA: ICD-10-CM

## 2019-09-03 DIAGNOSIS — R80.8 OTHER PROTEINURIA: ICD-10-CM

## 2019-09-03 DIAGNOSIS — I10 ESSENTIAL HYPERTENSION: ICD-10-CM

## 2019-09-03 DIAGNOSIS — N28.9 ACUTE RENAL INSUFFICIENCY: Primary | ICD-10-CM

## 2019-09-03 LAB
ALBUMIN SERPL BCP-MCNC: 3.8 G/DL (ref 3–5.2)
ALBUMIN SERPL BCP-MCNC: 3.8 G/DL (ref 3–5.2)
ALP SERPL-CCNC: 77 U/L (ref 43–122)
ALT SERPL W P-5'-P-CCNC: 12 U/L (ref 9–52)
ANION GAP SERPL CALCULATED.3IONS-SCNC: 7 MMOL/L (ref 5–14)
ANION GAP SERPL CALCULATED.3IONS-SCNC: 7 MMOL/L (ref 5–14)
APTT PPP: 28 SECONDS (ref 25–32)
AST SERPL W P-5'-P-CCNC: 26 U/L (ref 17–59)
BASOPHILS # BLD AUTO: 0.1 THOUSANDS/ΜL (ref 0–0.1)
BASOPHILS NFR BLD AUTO: 1 % (ref 0–1)
BILIRUB SERPL-MCNC: 0.1 MG/DL
BUN SERPL-MCNC: 42 MG/DL (ref 5–25)
BUN SERPL-MCNC: 42 MG/DL (ref 5–25)
CALCIUM SERPL-MCNC: 7.7 MG/DL (ref 8.4–10.2)
CALCIUM SERPL-MCNC: 7.8 MG/DL (ref 8.4–10.2)
CHLORIDE SERPL-SCNC: 111 MMOL/L (ref 97–108)
CHLORIDE SERPL-SCNC: 111 MMOL/L (ref 97–108)
CHOLEST SERPL-MCNC: 150 MG/DL
CO2 SERPL-SCNC: 23 MMOL/L (ref 22–30)
CO2 SERPL-SCNC: 23 MMOL/L (ref 22–30)
CREAT SERPL-MCNC: 5.58 MG/DL (ref 0.7–1.5)
CREAT SERPL-MCNC: 5.71 MG/DL (ref 0.7–1.5)
EOSINOPHIL # BLD AUTO: 0.7 THOUSAND/ΜL (ref 0–0.4)
EOSINOPHIL NFR BLD AUTO: 9 % (ref 0–6)
ERYTHROCYTE [DISTWIDTH] IN BLOOD BY AUTOMATED COUNT: 14.5 %
EST. AVERAGE GLUCOSE BLD GHB EST-MCNC: 100 MG/DL
GFR SERPL CREATININE-BSD FRML MDRD: 12 ML/MIN/1.73SQ M
GFR SERPL CREATININE-BSD FRML MDRD: 12 ML/MIN/1.73SQ M
GLUCOSE P FAST SERPL-MCNC: 126 MG/DL (ref 70–99)
GLUCOSE P FAST SERPL-MCNC: 126 MG/DL (ref 70–99)
HBA1C MFR BLD: 5.1 % (ref 4.2–6.3)
HCT VFR BLD AUTO: 32.9 % (ref 41–53)
HDLC SERPL-MCNC: 28 MG/DL (ref 40–59)
HGB BLD-MCNC: 11.1 G/DL (ref 13.5–17.5)
INR PPP: 0.91 (ref 0.91–1.09)
LDLC SERPL CALC-MCNC: 104 MG/DL
LYMPHOCYTES # BLD AUTO: 2.2 THOUSANDS/ΜL (ref 0.5–4)
LYMPHOCYTES NFR BLD AUTO: 28 % (ref 25–45)
MCH RBC QN AUTO: 32.6 PG (ref 26–34)
MCHC RBC AUTO-ENTMCNC: 33.6 G/DL (ref 31–36)
MCV RBC AUTO: 97 FL (ref 80–100)
MONOCYTES # BLD AUTO: 0.6 THOUSAND/ΜL (ref 0.2–0.9)
MONOCYTES NFR BLD AUTO: 8 % (ref 1–10)
NEUTROPHILS # BLD AUTO: 4.4 THOUSANDS/ΜL (ref 1.8–7.8)
NEUTS SEG NFR BLD AUTO: 55 % (ref 45–65)
PHOSPHATE SERPL-MCNC: 4.3 MG/DL (ref 2.5–4.8)
PLATELET # BLD AUTO: 259 THOUSANDS/UL (ref 150–450)
PMV BLD AUTO: 8.8 FL (ref 8.9–12.7)
POTASSIUM SERPL-SCNC: 4.9 MMOL/L (ref 3.6–5)
POTASSIUM SERPL-SCNC: 5.1 MMOL/L (ref 3.6–5)
PROT SERPL-MCNC: 7.4 G/DL (ref 5.9–8.4)
PROTHROMBIN TIME: 10 SECONDS (ref 9.8–12)
RBC # BLD AUTO: 3.39 MILLION/UL (ref 4.5–5.9)
SODIUM SERPL-SCNC: 141 MMOL/L (ref 137–147)
SODIUM SERPL-SCNC: 141 MMOL/L (ref 137–147)
TRIGL SERPL-MCNC: 88 MG/DL
TSH SERPL DL<=0.05 MIU/L-ACNC: 1.31 UIU/ML (ref 0.47–4.68)
WBC # BLD AUTO: 8 THOUSAND/UL (ref 4.5–11)

## 2019-09-03 PROCEDURE — 85610 PROTHROMBIN TIME: CPT

## 2019-09-03 PROCEDURE — 84443 ASSAY THYROID STIM HORMONE: CPT

## 2019-09-03 PROCEDURE — 80069 RENAL FUNCTION PANEL: CPT

## 2019-09-03 PROCEDURE — 36415 COLL VENOUS BLD VENIPUNCTURE: CPT

## 2019-09-03 PROCEDURE — 83036 HEMOGLOBIN GLYCOSYLATED A1C: CPT

## 2019-09-03 PROCEDURE — 80061 LIPID PANEL: CPT

## 2019-09-03 PROCEDURE — 80053 COMPREHEN METABOLIC PANEL: CPT

## 2019-09-03 PROCEDURE — 85730 THROMBOPLASTIN TIME PARTIAL: CPT

## 2019-09-03 PROCEDURE — 85025 COMPLETE CBC W/AUTO DIFF WBC: CPT

## 2019-09-24 ENCOUNTER — APPOINTMENT (OUTPATIENT)
Dept: LAB | Facility: HOSPITAL | Age: 54
End: 2019-09-24
Payer: COMMERCIAL

## 2019-09-24 DIAGNOSIS — N28.9 ACUTE RENAL INSUFFICIENCY: ICD-10-CM

## 2019-09-24 LAB
25(OH)D3 SERPL-MCNC: 9.9 NG/ML (ref 30–100)
ALBUMIN SERPL BCP-MCNC: 3.8 G/DL (ref 3–5.2)
ANION GAP SERPL CALCULATED.3IONS-SCNC: 8 MMOL/L (ref 5–14)
BUN SERPL-MCNC: 45 MG/DL (ref 5–25)
CALCIUM SERPL-MCNC: 8 MG/DL (ref 8.4–10.2)
CHLORIDE SERPL-SCNC: 110 MMOL/L (ref 97–108)
CO2 SERPL-SCNC: 23 MMOL/L (ref 22–30)
CREAT SERPL-MCNC: 6.16 MG/DL (ref 0.7–1.5)
CREAT UR-MCNC: 240 MG/DL
GFR SERPL CREATININE-BSD FRML MDRD: 11 ML/MIN/1.73SQ M
GLUCOSE P FAST SERPL-MCNC: 99 MG/DL (ref 70–99)
HCT VFR BLD AUTO: 32.1 % (ref 41–53)
HGB BLD-MCNC: 10.9 G/DL (ref 13.5–17.5)
MICROALBUMIN UR-MCNC: 6310 MG/L (ref 0–20)
MICROALBUMIN/CREAT 24H UR: 2629 MG/G CREATININE (ref 0–30)
PHOSPHATE SERPL-MCNC: 3.8 MG/DL (ref 2.5–4.8)
POTASSIUM SERPL-SCNC: 5.3 MMOL/L (ref 3.6–5)
PTH-INTACT SERPL-MCNC: 334 PG/ML (ref 16.7–78.9)
SODIUM SERPL-SCNC: 141 MMOL/L (ref 137–147)

## 2019-09-24 PROCEDURE — 85014 HEMATOCRIT: CPT

## 2019-09-24 PROCEDURE — 82570 ASSAY OF URINE CREATININE: CPT | Performed by: FAMILY MEDICINE

## 2019-09-24 PROCEDURE — 85018 HEMOGLOBIN: CPT

## 2019-09-24 PROCEDURE — 82043 UR ALBUMIN QUANTITATIVE: CPT | Performed by: FAMILY MEDICINE

## 2019-09-24 PROCEDURE — 80069 RENAL FUNCTION PANEL: CPT

## 2019-09-24 PROCEDURE — 82306 VITAMIN D 25 HYDROXY: CPT

## 2019-09-24 PROCEDURE — 36415 COLL VENOUS BLD VENIPUNCTURE: CPT

## 2019-09-24 PROCEDURE — 83970 ASSAY OF PARATHORMONE: CPT

## 2019-10-17 LAB
LEFT EYE DIABETIC RETINOPATHY: NORMAL
RIGHT EYE DIABETIC RETINOPATHY: NORMAL

## 2019-10-18 ENCOUNTER — APPOINTMENT (OUTPATIENT)
Dept: LAB | Facility: HOSPITAL | Age: 54
End: 2019-10-18
Payer: COMMERCIAL

## 2019-10-18 ENCOUNTER — TRANSCRIBE ORDERS (OUTPATIENT)
Dept: ADMINISTRATIVE | Facility: HOSPITAL | Age: 54
End: 2019-10-18

## 2019-10-18 ENCOUNTER — OFFICE VISIT (OUTPATIENT)
Dept: FAMILY MEDICINE CLINIC | Facility: CLINIC | Age: 54
End: 2019-10-18
Payer: COMMERCIAL

## 2019-10-18 VITALS
SYSTOLIC BLOOD PRESSURE: 154 MMHG | HEIGHT: 71 IN | BODY MASS INDEX: 37.8 KG/M2 | WEIGHT: 270 LBS | TEMPERATURE: 98.2 F | RESPIRATION RATE: 16 BRPM | HEART RATE: 68 BPM | OXYGEN SATURATION: 99 % | DIASTOLIC BLOOD PRESSURE: 90 MMHG

## 2019-10-18 DIAGNOSIS — Z00.01 ENCOUNTER FOR WELL ADULT EXAM WITH ABNORMAL FINDINGS: Primary | ICD-10-CM

## 2019-10-18 DIAGNOSIS — N18.5 CHRONIC KIDNEY DISEASE, STAGE V (HCC): ICD-10-CM

## 2019-10-18 DIAGNOSIS — N18.5 CHRONIC KIDNEY DISEASE, STAGE V (HCC): Primary | ICD-10-CM

## 2019-10-18 DIAGNOSIS — E11.621 TYPE 2 DIABETES MELLITUS WITH FOOT ULCER, WITH LONG-TERM CURRENT USE OF INSULIN (HCC): ICD-10-CM

## 2019-10-18 DIAGNOSIS — I15.9 SECONDARY HYPERTENSION: ICD-10-CM

## 2019-10-18 DIAGNOSIS — E87.5 HIGH POTASSIUM: ICD-10-CM

## 2019-10-18 DIAGNOSIS — E66.01 SEVERE OBESITY (BMI 35.0-39.9) WITH COMORBIDITY (HCC): ICD-10-CM

## 2019-10-18 DIAGNOSIS — Z79.4 TYPE 2 DIABETES MELLITUS WITH FOOT ULCER, WITH LONG-TERM CURRENT USE OF INSULIN (HCC): ICD-10-CM

## 2019-10-18 DIAGNOSIS — F33.0 MILD EPISODE OF RECURRENT MAJOR DEPRESSIVE DISORDER (HCC): ICD-10-CM

## 2019-10-18 DIAGNOSIS — I10 ESSENTIAL HYPERTENSION: ICD-10-CM

## 2019-10-18 DIAGNOSIS — L97.509 TYPE 2 DIABETES MELLITUS WITH FOOT ULCER, WITH LONG-TERM CURRENT USE OF INSULIN (HCC): ICD-10-CM

## 2019-10-18 LAB
ALBUMIN SERPL BCP-MCNC: 4.1 G/DL (ref 3–5.2)
ANION GAP SERPL CALCULATED.3IONS-SCNC: 9 MMOL/L (ref 5–14)
BUN SERPL-MCNC: 47 MG/DL (ref 5–25)
CALCIUM SERPL-MCNC: 7.7 MG/DL (ref 8.4–10.2)
CHLORIDE SERPL-SCNC: 109 MMOL/L (ref 97–108)
CO2 SERPL-SCNC: 23 MMOL/L (ref 22–30)
CREAT SERPL-MCNC: 6.24 MG/DL (ref 0.7–1.5)
CREAT UR-MCNC: 205 MG/DL
ERYTHROCYTE [DISTWIDTH] IN BLOOD BY AUTOMATED COUNT: 14.5 %
FERRITIN SERPL-MCNC: 350 NG/ML (ref 8–388)
GFR SERPL CREATININE-BSD FRML MDRD: 11 ML/MIN/1.73SQ M
GLUCOSE P FAST SERPL-MCNC: 95 MG/DL (ref 70–99)
HCT VFR BLD AUTO: 31.2 % (ref 41–53)
HGB BLD-MCNC: 10.5 G/DL (ref 13.5–17.5)
IRON SATN MFR SERPL: 37 %
IRON SERPL-MCNC: 82 UG/DL (ref 65–175)
MCH RBC QN AUTO: 31.9 PG (ref 26–34)
MCHC RBC AUTO-ENTMCNC: 33.6 G/DL (ref 31–36)
MCV RBC AUTO: 95 FL (ref 80–100)
PHOSPHATE SERPL-MCNC: 3.5 MG/DL (ref 2.5–4.8)
PLATELET # BLD AUTO: 270 THOUSANDS/UL (ref 150–450)
PMV BLD AUTO: 8.4 FL (ref 8.9–12.7)
POTASSIUM SERPL-SCNC: 5.5 MMOL/L (ref 3.6–5)
PROT UR-MCNC: 760 MG/DL
PROT/CREAT UR: 3.71 MG/G{CREAT} (ref 0–0.1)
PTH-INTACT SERPL-MCNC: 410 PG/ML (ref 16.7–78.9)
RBC # BLD AUTO: 3.28 MILLION/UL (ref 4.5–5.9)
SODIUM SERPL-SCNC: 141 MMOL/L (ref 137–147)
TIBC SERPL-MCNC: 220 UG/DL (ref 250–450)
WBC # BLD AUTO: 6.9 THOUSAND/UL (ref 4.5–11)

## 2019-10-18 PROCEDURE — 83540 ASSAY OF IRON: CPT

## 2019-10-18 PROCEDURE — 80069 RENAL FUNCTION PANEL: CPT

## 2019-10-18 PROCEDURE — 83550 IRON BINDING TEST: CPT

## 2019-10-18 PROCEDURE — 36415 COLL VENOUS BLD VENIPUNCTURE: CPT

## 2019-10-18 PROCEDURE — 83970 ASSAY OF PARATHORMONE: CPT

## 2019-10-18 PROCEDURE — 84156 ASSAY OF PROTEIN URINE: CPT | Performed by: NURSE PRACTITIONER

## 2019-10-18 PROCEDURE — 82570 ASSAY OF URINE CREATININE: CPT | Performed by: NURSE PRACTITIONER

## 2019-10-18 PROCEDURE — 82728 ASSAY OF FERRITIN: CPT

## 2019-10-18 PROCEDURE — 85027 COMPLETE CBC AUTOMATED: CPT

## 2019-10-18 PROCEDURE — 99396 PREV VISIT EST AGE 40-64: CPT | Performed by: FAMILY MEDICINE

## 2019-10-18 RX ORDER — NIFEDIPINE 60 MG/1
60 TABLET, FILM COATED, EXTENDED RELEASE ORAL
COMMUNITY
Start: 2019-10-11 | End: 2020-03-10 | Stop reason: ALTCHOICE

## 2019-10-18 RX ORDER — ERGOCALCIFEROL (VITAMIN D2) 1250 MCG
50000 CAPSULE ORAL
COMMUNITY
Start: 2019-10-11 | End: 2019-12-10

## 2019-10-18 NOTE — PROGRESS NOTES
FAMILY PRACTICE HEALTH MAINTENANCE OFFICE VISIT  [facility] [place] [facility] [place]    NAME: Sujit Barnes  AGE: 53 y o  SEX: male  : 1965     DATE: 10/19/2019    Assessment and Plan     1  Encounter for well adult exam with abnormal findings  Assessment & Plan:  Advice and education were given regarding nutrition, aerobic exercises, weight bearing exercises, cardiovascular risk reduction, fall risk reduction, and age appropriate supplements  The patient was counseled regarding instructions for management, risk factor reductions, prognosis, risks and benefits of treatment options, patient and family education, and importance of compliance with treatment  Patient declined flu shot for today      2  Severe obesity (BMI 35 0-39  9) with comorbidity ([address] )  Assessment & Plan:  The BMI is above average  BMI counseling and education was provided to the patient  Nutrition recommendations include reducing portion sizes, decreasing overall calorie intake, 3-5 servings of fruits/vegetables daily, reducing fast food intake, consuming healthier snacks, decreasing soda and/or juice intake, moderation in carbohydrate intake and reducing intake of saturated fat and trans fat  Exercise recommendations include moderate aerobic physical activity for 150 minutes/week, exercising 3-5 times per week and joining a gym  3  Essential hypertension  -     CBC and differential; Future  -     Basic metabolic panel; Future  -     Lipid Panel with Direct LDL reflex; Future  -     Hemoglobin A1C; Future    4  Type 2 diabetes mellitus with foot ulcer, with long-term current use of insulin (HCC)  -     CBC and differential; Future  -     Basic metabolic panel; Future  -     Lipid Panel with Direct LDL reflex; Future  -     Hemoglobin A1C; Future    5  High potassium  -     Potassium; Future    6   Mild episode of recurrent major depressive disorder [facility])  Assessment & Plan:  Patient depression screen is positive he declined to see a therapist            · Patient Counseling:   · Nutrition: Stressed importance of a well balanced diet, moderation of sodium/saturated fat, caloric balance and sufficient intake of fiber  · Exercise: Stressed the importance of regular exercise with a goal of 150 minutes per week  · Dental Health: Discussed daily flossing and brushing and regular dental visits     · Immunizations reviewed: Declined recommended vaccinations  · Discussed benefits of:  Colon Cancer Screening and Prostate Cancer Screening    BMI Counseling: Body mass index is 37 66 kg/m²  Discussed with patient's BMI with him        Chief Complaint     Chief Complaint   Patient presents with    Physical Exam     Yearly physical exam        History of Present Illness     Patient here for annual physical exam he deny any chest pain short of breath no palpitation no cough no wheezing no head the blurred vision no weakness or lateralized of the symptom no abdomen pain nausea vomiting or diarrhea no renal problem no rash no fever no change in the weight and no change in the mood patient is not smoker anymore      Well Adult Physical   Patient here for a comprehensive physical exam       Diet and Physical Activity  Diet: low carbohydrate diet and low salt diet  Exercise: frequently      Depression Screen  PHQ-9 Depression Screening    PHQ-9:    Frequency of the following problems over the past two weeks:       Little interest or pleasure in doing things:  3 - nearly every day  Feeling down, depressed, or hopeless:  3 - nearly every day  Trouble falling or staying asleep, or sleeping too much:  3 - nearly every day  Feeling tired or having little energy:  3 - nearly every day  Poor appetite or overeatin - not at all  Feeling bad about yourself - or that you are a failure or have let yourself or your family down:  0 - not at all  Trouble concentrating on things, such as reading the newspaper or watching television:  0 - not at all  Moving or speaking so slowly that other people could have noticed  Or the opposite - being so fidgety or restless that you have been moving around a lot more than usual:  0 - not at all  Thoughts that you would be better off dead, or of hurting yourself in some way:  0 - not at all  PHQ-2 Score:  6  PHQ-9 Score:  12          General Health  Hearing: Normal:  bilateral  Vision: wears glasses  Dental: regular dental visits          The following portions of the patient's history were reviewed and updated as appropriate: allergies, current medications, past family history, past medical history, past social history, past surgical history and problem list     Review of Systems     Review of Systems   Constitutional: Negative for fatigue and fever  HENT: Negative for ear pain, sinus pressure, sinus pain and sore throat  Eyes: Negative for pain and redness  Respiratory: Negative for cough, chest tightness and shortness of breath  Cardiovascular: Negative for chest pain, palpitations and leg swelling  Gastrointestinal: Negative for abdominal pain, blood in stool, constipation, diarrhea and nausea  Genitourinary: Negative for flank pain, frequency and hematuria  Musculoskeletal: Negative for back pain and joint swelling  Skin: Negative for rash  Neurological: Negative for dizziness, numbness and headaches  Hematological: Does not bruise/bleed easily  Psychiatric/Behavioral: Negative for agitation and behavioral problems  Past Medical History     Past Medical History:   Diagnosis Date    Acute osteomyelitis of metatarsal bone of left foot ([facility] [address] ) 6/13/2016    Anemia     CAD in native artery     Cellulitis of foot, left 2/2/2016    Chronic kidney disease     Depression     Diabetes mellitus ([facility] [address] )     History of implantable cardioverter-defibrillator (ICD) placement     [employer]    Hypertension     Myocardial infarct, old 2006    in setting of cocaine use      Obesity     Peripheral neuropathy     Toe osteomyelitis, right ([address] ) 2018    Added automatically from request for surgery [id number]    Ventricular tachycardia [facility])        Past Surgical History     Past Surgical History:   Procedure Laterality Date    APPENDECTOMY      CARDIAC DEFIBRILLATOR PLACEMENT      MASTECTOMY FOR GYNECOMASTIA  2015    REDUCTION MAMMAPLASTY  2015    TOE AMPUTATION      Hallux amputation    TOE AMPUTATION Right 2018    Procedure: AMPUTATION 2ND TOE;  Surgeon: Neena Shaw DPM;  Location: [facility];  Service: Podiatry    WOUND DEBRIDEMENT Left 2016    Procedure: DEBRIDEMENT FOOT/TOE [facility] OUT);  Transmetatasral vs Lisfranc amputation , bone biopsy,;  Surgeon: Neena Shaw DPM;  Location: [facility];  Service:        Social History     Social History     Socioeconomic History    Marital status: Single     Spouse name: None    Number of children: None    Years of education: None    Highest education level: None   Occupational History    None   Social Needs    Financial resource strain: Not hard at all   [facility] insecurity:     Worry: Never true     Inability: Never true    Transportation needs:     Medical: No     Non-medical: No   Tobacco Use    Smoking status: Former Smoker     Packs/day: 1 00     Years: 18 00     Pack years: 18 00     Types: Cigarettes     Start date:      Last attempt to quit: 2006     Years since quittin 8    Smokeless tobacco: Former User    Tobacco comment: quite smoking    Substance and Sexual Activity    Alcohol use: Not Currently     Frequency: Never     Binge frequency: Never    Drug use: Not Currently     Frequency: 1 0 times per week     Types: Marijuana    Sexual activity: Yes     Partners: Female     Birth control/protection: None   Lifestyle    Physical activity:     Days per week: 3 days     Minutes per session: 70 min    Stress: Not at all   Relationships    Social connections:     Talks on phone: More than three times a week     Gets together: More than three times a week     Attends Shinto service: Never     Active member of club or organization: No     Attends meetings of clubs or organizations: Never     Relationship status: Never     Intimate partner violence:     Fear of current or ex partner: No     Emotionally abused: No     Physically abused: No     Forced sexual activity: No   Other Topics Concern    None   Social History Narrative    None       Family History     Family History   Adopted: Yes       Current Medications       Current Outpatient Medications:     aspirin 81 MG tablet, Take 81 mg by mouth daily, Disp: , Rfl:     ergocalciferol (ERGOCALCIFEROL) 47128 units capsule, Take 50,000 Units by mouth, Disp: , Rfl:     glucose blood test strip, take 1 by Topical route test bid, Disp: , Rfl:     Lancets (FREESTYLE) lancets, Tests bid, Disp: , Rfl:     NIFEdipine ER (ADALAT CC) 60 MG 24 hr tablet, Take 60 mg by mouth, Disp: , Rfl:      Allergies     Allergies   Allergen Reactions    Penicillins Hives     Ancef TAKEN,Childhood       Objective     /90 (BP Location: Right arm, Patient Position: Sitting, Cuff Size: Large)   Pulse 68   Temp 98 2 °F (36 8 °C) (Tympanic)   Resp 16   Ht 5' 11" (1 803 m)   Wt 122 kg (270 lb)   SpO2 99%   BMI 37 66 kg/m²      Physical Exam   Constitutional: He is oriented to person, place, and time  He appears well-developed and well-nourished  HENT:   Head: Normocephalic  Right Ear: External ear normal    Left Ear: External ear normal    Eyes: Conjunctivae and EOM are normal  Right eye exhibits no discharge  Left eye exhibits no discharge  Neck: No JVD present  Cardiovascular: Normal rate, regular rhythm and normal heart sounds  Exam reveals no gallop  No murmur heard  Pulmonary/Chest: Effort normal  No respiratory distress  He has no wheezes  He has no rales  He exhibits no tenderness  Abdominal: He exhibits no mass   There is no tenderness  There is no rebound  Musculoskeletal: He exhibits no edema or tenderness  Neurological: He is alert and oriented to person, place, and time  Skin: No rash noted  No erythema  Psychiatric: He has a normal mood and affect  His behavior is normal            Adriana Jama MD  [place] [facility]  BMI Counseling: Body mass index is 37 66 kg/m²  The BMI is above normal  Nutrition recommendations include reducing portion sizes, decreasing overall calorie intake, 3-5 servings of fruits/vegetables daily, reducing fast food intake and consuming healthier snacks  Exercise recommendations include moderate aerobic physical activity for 150 minutes/week

## 2019-10-18 NOTE — PATIENT INSTRUCTIONS

## 2019-10-19 PROBLEM — Z00.01 ENCOUNTER FOR WELL ADULT EXAM WITH ABNORMAL FINDINGS: Status: ACTIVE | Noted: 2019-10-18

## 2019-10-19 PROBLEM — L97.509 NEUROPATHIC ULCER OF TOE (HCC): Status: ACTIVE | Noted: 2019-08-20

## 2019-10-19 PROBLEM — E11.40 DIABETIC NEUROPATHY (HCC): Status: ACTIVE | Noted: 2019-08-20

## 2019-10-19 PROBLEM — N25.81 SECONDARY HYPERPARATHYROIDISM OF RENAL ORIGIN (HCC): Status: ACTIVE | Noted: 2019-10-08

## 2019-10-19 PROBLEM — Z00.01 ENCOUNTER FOR WELL ADULT EXAM WITH ABNORMAL FINDINGS: Status: ACTIVE | Noted: 2019-10-19

## 2019-10-19 PROBLEM — I13.10 HYPERTENSIVE HEART AND CHRONIC KIDNEY DISEASE WITHOUT HEART FAILURE, WITH STAGE 1 THROUGH STAGE 4 CHRONIC KIDNEY DISEASE, OR UNSPECIFIED CHRONIC KIDNEY DISEASE: Status: ACTIVE | Noted: 2019-09-11

## 2019-10-19 NOTE — ASSESSMENT & PLAN NOTE
Advice and education were given regarding nutrition, aerobic exercises, weight bearing exercises, cardiovascular risk reduction, fall risk reduction, and age appropriate supplements  The patient was counseled regarding instructions for management, risk factor reductions, prognosis, risks and benefits of treatment options, patient and family education, and importance of compliance with treatment       Patient declined flu shot for today

## 2019-10-22 ENCOUNTER — TELEPHONE (OUTPATIENT)
Dept: FAMILY MEDICINE CLINIC | Facility: CLINIC | Age: 54
End: 2019-10-22

## 2019-10-22 DIAGNOSIS — E87.6 HYPOKALEMIA: Primary | ICD-10-CM

## 2019-10-22 DIAGNOSIS — E87.5 HYPERKALEMIA: ICD-10-CM

## 2019-10-22 NOTE — TELEPHONE ENCOUNTER
----- Message from Narinder Staton MD sent at 10/21/2019  4:59 PM EDT -----  Potassium level still elevated ,recomened low potassium diet   To recheck in 1 week

## 2019-10-30 ENCOUNTER — APPOINTMENT (OUTPATIENT)
Dept: LAB | Facility: HOSPITAL | Age: 54
End: 2019-10-30
Payer: COMMERCIAL

## 2019-10-30 DIAGNOSIS — I10 ESSENTIAL HYPERTENSION: ICD-10-CM

## 2019-10-30 DIAGNOSIS — E87.5 HYPERKALEMIA: ICD-10-CM

## 2019-10-30 DIAGNOSIS — L97.509 TYPE 2 DIABETES MELLITUS WITH FOOT ULCER, WITH LONG-TERM CURRENT USE OF INSULIN (HCC): ICD-10-CM

## 2019-10-30 DIAGNOSIS — Z79.4 TYPE 2 DIABETES MELLITUS WITH FOOT ULCER, WITH LONG-TERM CURRENT USE OF INSULIN (HCC): ICD-10-CM

## 2019-10-30 DIAGNOSIS — E11.621 TYPE 2 DIABETES MELLITUS WITH FOOT ULCER, WITH LONG-TERM CURRENT USE OF INSULIN (HCC): ICD-10-CM

## 2019-10-30 LAB
ANION GAP SERPL CALCULATED.3IONS-SCNC: 11 MMOL/L (ref 5–14)
BASOPHILS # BLD AUTO: 0.1 THOUSANDS/ΜL (ref 0–0.1)
BASOPHILS NFR BLD AUTO: 1 % (ref 0–1)
BUN SERPL-MCNC: 35 MG/DL (ref 5–25)
CALCIUM SERPL-MCNC: 7.4 MG/DL (ref 8.4–10.2)
CHLORIDE SERPL-SCNC: 109 MMOL/L (ref 97–108)
CHOLEST SERPL-MCNC: 186 MG/DL
CO2 SERPL-SCNC: 22 MMOL/L (ref 22–30)
CREAT SERPL-MCNC: 6.35 MG/DL (ref 0.7–1.5)
EOSINOPHIL # BLD AUTO: 0.5 THOUSAND/ΜL (ref 0–0.4)
EOSINOPHIL NFR BLD AUTO: 7 % (ref 0–6)
ERYTHROCYTE [DISTWIDTH] IN BLOOD BY AUTOMATED COUNT: 15.3 %
EST. AVERAGE GLUCOSE BLD GHB EST-MCNC: 82 MG/DL
GFR SERPL CREATININE-BSD FRML MDRD: 10 ML/MIN/1.73SQ M
GLUCOSE P FAST SERPL-MCNC: 101 MG/DL (ref 70–99)
HBA1C MFR BLD: 4.5 % (ref 4.2–6.3)
HCT VFR BLD AUTO: 30.7 % (ref 41–53)
HDLC SERPL-MCNC: 28 MG/DL
HGB BLD-MCNC: 10.5 G/DL (ref 13.5–17.5)
LDLC SERPL CALC-MCNC: 132 MG/DL
LYMPHOCYTES # BLD AUTO: 2.6 THOUSANDS/ΜL (ref 0.5–4)
LYMPHOCYTES NFR BLD AUTO: 33 % (ref 25–45)
MCH RBC QN AUTO: 32.2 PG (ref 26–34)
MCHC RBC AUTO-ENTMCNC: 34.1 G/DL (ref 31–36)
MCV RBC AUTO: 95 FL (ref 80–100)
MONOCYTES # BLD AUTO: 0.6 THOUSAND/ΜL (ref 0.2–0.9)
MONOCYTES NFR BLD AUTO: 8 % (ref 1–10)
NEUTROPHILS # BLD AUTO: 4 THOUSANDS/ΜL (ref 1.8–7.8)
NEUTS SEG NFR BLD AUTO: 51 % (ref 45–65)
PLATELET # BLD AUTO: 295 THOUSANDS/UL (ref 150–450)
PMV BLD AUTO: 7.8 FL (ref 8.9–12.7)
POTASSIUM SERPL-SCNC: 5.1 MMOL/L (ref 3.6–5)
RBC # BLD AUTO: 3.25 MILLION/UL (ref 4.5–5.9)
SODIUM SERPL-SCNC: 142 MMOL/L (ref 137–147)
TRIGL SERPL-MCNC: 130 MG/DL
WBC # BLD AUTO: 7.9 THOUSAND/UL (ref 4.5–11)

## 2019-10-30 PROCEDURE — 80048 BASIC METABOLIC PNL TOTAL CA: CPT

## 2019-10-30 PROCEDURE — 85025 COMPLETE CBC W/AUTO DIFF WBC: CPT

## 2019-10-30 PROCEDURE — 36415 COLL VENOUS BLD VENIPUNCTURE: CPT

## 2019-10-30 PROCEDURE — 80061 LIPID PANEL: CPT

## 2019-10-30 PROCEDURE — 83036 HEMOGLOBIN GLYCOSYLATED A1C: CPT

## 2019-10-31 ENCOUNTER — TELEPHONE (OUTPATIENT)
Dept: FAMILY MEDICINE CLINIC | Facility: CLINIC | Age: 54
End: 2019-10-31

## 2020-02-29 DIAGNOSIS — E78.2 MIXED HYPERLIPIDEMIA: ICD-10-CM

## 2020-03-02 ENCOUNTER — TRANSITIONAL CARE MANAGEMENT (OUTPATIENT)
Dept: FAMILY MEDICINE CLINIC | Facility: CLINIC | Age: 55
End: 2020-03-02

## 2020-03-02 DIAGNOSIS — I10 ESSENTIAL HYPERTENSION: Primary | ICD-10-CM

## 2020-03-02 DIAGNOSIS — D63.8 ANEMIA IN OTHER CHRONIC DISEASES CLASSIFIED ELSEWHERE: ICD-10-CM

## 2020-03-02 DIAGNOSIS — E78.2 MIXED HYPERLIPIDEMIA: ICD-10-CM

## 2020-03-02 DIAGNOSIS — N18.2 STAGE 2 CHRONIC KIDNEY DISEASE: ICD-10-CM

## 2020-03-02 DIAGNOSIS — E11.51 TYPE II DIABETES MELLITUS WITH PERIPHERAL CIRCULATORY DISORDER (HCC): ICD-10-CM

## 2020-03-02 RX ORDER — SIMVASTATIN 10 MG
TABLET ORAL
Qty: 30 TABLET | Refills: 0 | Status: SHIPPED | OUTPATIENT
Start: 2020-03-02 | End: 2020-03-10 | Stop reason: ALTCHOICE

## 2020-03-10 ENCOUNTER — OFFICE VISIT (OUTPATIENT)
Dept: FAMILY MEDICINE CLINIC | Facility: CLINIC | Age: 55
End: 2020-03-10
Payer: COMMERCIAL

## 2020-03-10 VITALS
TEMPERATURE: 98.9 F | OXYGEN SATURATION: 97 % | DIASTOLIC BLOOD PRESSURE: 80 MMHG | HEIGHT: 71 IN | SYSTOLIC BLOOD PRESSURE: 138 MMHG | HEART RATE: 73 BPM | BODY MASS INDEX: 34.44 KG/M2 | WEIGHT: 246 LBS | RESPIRATION RATE: 16 BRPM

## 2020-03-10 DIAGNOSIS — S98.131D TRAUMATIC AMPUTATION OF TOE OF RIGHT FOOT, SUBSEQUENT ENCOUNTER (HCC): ICD-10-CM

## 2020-03-10 DIAGNOSIS — E66.01 SEVERE OBESITY (BMI 35.0-39.9) WITH COMORBIDITY (HCC): ICD-10-CM

## 2020-03-10 DIAGNOSIS — F33.0 MILD EPISODE OF RECURRENT MAJOR DEPRESSIVE DISORDER (HCC): ICD-10-CM

## 2020-03-10 DIAGNOSIS — R74.01 TRANSAMINITIS: Primary | ICD-10-CM

## 2020-03-10 DIAGNOSIS — D69.6 THROMBOCYTOPENIA (HCC): ICD-10-CM

## 2020-03-10 DIAGNOSIS — D63.8 ANEMIA IN OTHER CHRONIC DISEASES CLASSIFIED ELSEWHERE: ICD-10-CM

## 2020-03-10 DIAGNOSIS — Z99.2 CHRONIC KIDNEY DISEASE WITH END STAGE RENAL FAILURE ON DIALYSIS (HCC): ICD-10-CM

## 2020-03-10 DIAGNOSIS — R57.0 CARDIOGENIC SHOCK (HCC): ICD-10-CM

## 2020-03-10 DIAGNOSIS — N18.6 ESRD (END STAGE RENAL DISEASE) (HCC): ICD-10-CM

## 2020-03-10 DIAGNOSIS — N18.6 CHRONIC KIDNEY DISEASE WITH END STAGE RENAL FAILURE ON DIALYSIS (HCC): ICD-10-CM

## 2020-03-10 PROCEDURE — 99495 TRANSJ CARE MGMT MOD F2F 14D: CPT | Performed by: FAMILY MEDICINE

## 2020-03-10 RX ORDER — PRAVASTATIN SODIUM 40 MG
40 TABLET ORAL
COMMUNITY
End: 2020-03-31 | Stop reason: SDUPTHER

## 2020-03-10 RX ORDER — CALCIUM ACETATE 667 MG/1
CAPSULE ORAL
COMMUNITY
Start: 2020-02-13 | End: 2020-10-27 | Stop reason: SDDI

## 2020-03-10 RX ORDER — LIDOCAINE AND PRILOCAINE 25; 25 MG/G; MG/G
CREAM TOPICAL
COMMUNITY
Start: 2020-01-15 | End: 2020-06-11 | Stop reason: ALTCHOICE

## 2020-03-10 RX ORDER — ISOSORBIDE MONONITRATE 30 MG/1
30 TABLET, EXTENDED RELEASE ORAL DAILY
COMMUNITY
Start: 2020-02-29 | End: 2020-10-27 | Stop reason: SDDI

## 2020-03-10 RX ORDER — NITROGLYCERIN 0.4 MG/1
0.4 TABLET SUBLINGUAL
COMMUNITY
Start: 2020-02-28 | End: 2021-02-27

## 2020-03-10 RX ORDER — GLUCOSAMINE/CHONDR SU A SOD 750-600 MG
TABLET ORAL
COMMUNITY
Start: 2020-02-21 | End: 2020-10-27 | Stop reason: SDDI

## 2020-03-10 RX ORDER — CARVEDILOL 6.25 MG/1
6.25 TABLET ORAL
COMMUNITY
Start: 2020-02-28 | End: 2020-10-27 | Stop reason: SDDI

## 2020-03-10 RX ORDER — FOLIC ACID 1 MG/1
1 TABLET ORAL DAILY
COMMUNITY
Start: 2020-02-29 | End: 2020-10-27 | Stop reason: SDDI

## 2020-03-10 RX ORDER — PANTOPRAZOLE SODIUM 40 MG/1
40 TABLET, DELAYED RELEASE ORAL
COMMUNITY
Start: 2020-02-29 | End: 2020-10-27 | Stop reason: SDDI

## 2020-03-10 NOTE — PROGRESS NOTES
Assessment/Plan:     Cardiogenic shock ([facility] )   Status post hospitalization patient ejection fraction a 20-30% with the decrease the on the wall motion and the severe left ventricular dysfunction patient on Coreg mononitrate isosorbide and Plavix and patient will follow up with the Cardiology appointment the and on March 12 a discussed the patient important compliant with the medication and follow-up with the appointment    Chronic kidney disease with end stage renal failure on dialysis [facility])  Patient on dialysis 3 times a week does he does follow up with the Nephrology discussed avoid nonsteroidal anti-inflammatory drugs keep will hydration    Transaminitis  A most probably secondary to the cardiogenic shock liver enzyme is improving the plan to repeat a LFT in 1 week    Traumatic amputation of toe of right foot ([facility] )  The history of amputation secondary to osteomyelitis secondary to uncontrolled diabetic patient patient does follow up with the podiatric periodically    Recurrent major depressive disorder [facility])  Patient would like to 100 the he decline seen therapist or psychiatric    Severe obesity (BMI 35 0-39  9) with comorbidity ([facility] )  A chronic uncontrolled encouraged patient to lose weight low carb diet smaller portion low-fat diet discussed with the patient also increase physical activity    Thrombocytopenia ([facility] )  A new diagnosis status post hospitalization patient seen by Oncology Hematology during the hospital visit finding most probably secondary to blood transfusion or versus hemolysis plan to repeat CBC     Anemia  Status post hospitalization status post 4 unit of red blood cell transfusion his hemoglobin above 7 4 on the discharge patient on dialysis 3 times a day he is already on Epogen shot plan to recheck CBC       Diagnoses and all orders for this visit:    Transaminitis  -     Comprehensive metabolic panel;  Future    ESRD (end stage renal disease) ([facility] )  -     CBC and differential; Future    Thrombocytopenia (HCC)  -     CBC and differential; Future    Cardiogenic shock (HCC)    Chronic kidney disease with end stage renal failure on dialysis [facility])    Traumatic amputation of toe of right foot, subsequent encounter ([facility] )    Mild episode of recurrent major depressive disorder (HCC)    Severe obesity (BMI 35 0-39  9) with comorbidity ([facility] )    Anemia in other chronic diseases classified elsewhere    Other orders  -     carvedilol (COREG) 6 25 mg tablet; Take 6 25 mg by mouth  -     RA VITAMIN D-3 50 MCG (2000 UT) CAPS; take 1 capsule by mouth once daily as directed  -     folic acid (FOLVITE) 1 mg tablet; Take 1 mg by mouth daily  -     isosorbide mononitrate (IMDUR) 30 mg 24 hr tablet; Take 30 mg by mouth daily  -     linaGLIPtin 5 MG TABS; Take 5 mg by mouth daily  -     pravastatin (PRAVACHOL) 40 mg tablet; Take 40 mg by mouth  -     pantoprazole (PROTONIX) 40 mg tablet; Take 40 mg by mouth  -     nitroglycerin (NITROSTAT) 0 4 mg SL tablet; Place 0 4 mg under the tongue  -     lidocaine-prilocaine (EMLA) cream; Apply topically  -     epoetin patel (EPOGEN,PROCRIT) 10,000 units/mL; Inject 10,000 Units under the skin  -     calcium acetate (PHOSLO) capsule; TAKE 2 CAPSULES BY MOUTH THREE TIMES DAILY WITH MEALS  -     Multiple Vitamin (MULTI-VITAMIN DAILY PO); Take 1 capsule by mouth daily      BMI Counseling: Body mass index is 34 31 kg/m²  The BMI is above normal  Nutrition recommendations include decreasing portion sizes and decreasing fast food intake  Exercise recommendations include exercising 3-5 times per week  Subjective:     Patient ID: Hughes Blizzard  is a 53 y o  male      Patient here status post hospitalization on February 21st patient went to the [facility] Emergency Room complained about urine eyes weakness and dizziness and fatigue the blood work it show his hemoglobin 6 6 white blood cell 23 1 and his bicarb 16 anion gap is 27 his troponin was above 40 and lactate 16 patient was admitted to ICU with the diagnosis of cardiogenic shock patient did receive a 2 unit of red blood cell he did have RHC  show elevated PCwp a echocardiogram was done and showed ejection fraction is 30% with hypokinesis the and worse with the wall motion pulmonary hypertension patient was started on aspirin Plavix and heparin drip left heart catheterization was done in February 28  He had LHC with severe left ventricular systolic dysfunction with the EF a 20-30% patient did have multiple transfusion to keep his hemoglobin above 7 5 he did the received in total 4 unit a during hospitalization patient did have thrombocytopenia the Hematology Oncology was consult and the started thrombocytopenia related to of blood transfusion versus hemolysis a during hospitalization also patient had the elevated trance immunized the secondary to liver shock but the liver insulin enzyme has been gradually improved blood culture was negative the stool panel was negative and patient was on antibiotic vancomycin/cefepime which is start on February 25th patient was discharge on February 28 with the recommendation to take it region the as a treatment for her is diabetic patient was also started on carvedilol 6 25 mg 1 tablet twice a day he is currently also on Tylenol for the pain Plavix folic acid and the isosorbide mononitrate hospital record and medication reconciled with the patient  The patient today in the office still feel fatigue but better compared with before a deny any ecchymosis no short of breath no lower extremity edema per patient he been taking his medication as prescribed patient also has a chronic kidney disease stage 5 on dialysis 3 times a week      Review of Systems   Constitutional: Positive for fatigue  Negative for fever  HENT: Negative for ear pain, sinus pressure, sinus pain and sore throat  Eyes: Negative for pain and redness     Respiratory: Negative for cough, chest tightness and shortness of breath  Cardiovascular: Negative for chest pain, palpitations and leg swelling  Gastrointestinal: Negative for abdominal pain, blood in stool, constipation, diarrhea and nausea  Genitourinary: Negative for flank pain, frequency and hematuria  Musculoskeletal: Negative for back pain and joint swelling  Skin: Negative for rash  Neurological: Negative for dizziness, numbness and headaches  Hematological: Does not bruise/bleed easily  Objective:     Physical Exam   Constitutional: He is oriented to person, place, and time  He appears well-developed and well-nourished  HENT:   Head: Normocephalic  Right Ear: External ear normal    Left Ear: External ear normal    Eyes: Conjunctivae and EOM are normal  Right eye exhibits no discharge  Left eye exhibits no discharge  Neck: No JVD present  Cardiovascular: Normal rate, regular rhythm and normal heart sounds  Exam reveals no gallop  No murmur heard  Pulmonary/Chest: Effort normal  No respiratory distress  He has no wheezes  He has no rales  He exhibits no tenderness  Abdominal: He exhibits no mass  There is no tenderness  There is no rebound  Musculoskeletal: He exhibits no edema or tenderness  Neurological: He is alert and oriented to person, place, and time  Skin: No rash noted  No erythema  Vitals:    03/10/20 1120   BP: 138/80   BP Location: Right arm   Patient Position: Sitting   Cuff Size: Large   Pulse: 73   Resp: 16   Temp: 98 9 °F (37 2 °C)   TempSrc: Tympanic   SpO2: 97%   Weight: 112 kg (246 lb)   Height: 5' 11" (1 803 m)       Transitional Care Management Review:  Odalys Wood  is a 53 y o  male here for TCM follow up       During the TCM phone call patient stated:    TCM Call (since 2/10/2020)     Date and time call was made  3/2/2020  9:03 AM    Hospital care reviewed  Records reviewed    Patient was hospitialized at  [facility]    Date of Admission  02/21/20    Date of discharge  02/28/20 Diagnosis  Stage 5 CKD ESRD    Disposition  Home    Were the patients medications reviewed and updated  Yes    Current Symptoms  Weakness    Weakness severity  Severe      TCM Call (since 2/10/2020)     Post hospital issues  None    Should patient be enrolled in anticoag monitoring? No    Scheduled for follow up?   Yes    Did you obtain your prescribed medications  Yes    Do you need help managing your prescriptions or medications  No    Is transportation to your appointment needed  No    I have advised the patient to call PCP with any new or worsening symptoms  sally Jackson 21  Family    The type of support provided  Emotional    Do you have social support  Yes, as much as I need    Are you recieving any outpatient services  No    Are you recieving home care services  No    Are you using any community resources  No    Current waiver services  No    Have you fallen in the last 12 months  No    Interperter language line needed  No    Counseling  Patient    Counseling topics  Prognosis    Comments  scheduled 3/10/2020          Ana Lilia Johnson MD

## 2020-03-12 PROBLEM — D69.6 THROMBOCYTOPENIA (HCC): Status: ACTIVE | Noted: 2020-03-12

## 2020-03-12 PROBLEM — D69.6 THROMBOCYTOPENIA (HCC): Status: ACTIVE | Noted: 2020-03-10

## 2020-03-12 PROBLEM — Z99.2 CHRONIC KIDNEY DISEASE WITH END STAGE RENAL FAILURE ON DIALYSIS (HCC): Status: ACTIVE | Noted: 2020-02-21

## 2020-03-12 NOTE — ASSESSMENT & PLAN NOTE
Patient on dialysis 3 times a week does he does follow up with the Nephrology discussed avoid nonsteroidal anti-inflammatory drugs keep will hydration

## 2020-03-12 NOTE — ASSESSMENT & PLAN NOTE
A new diagnosis status post hospitalization patient seen by Oncology Hematology during the hospital visit finding most probably secondary to blood transfusion or versus hemolysis plan to repeat CBC

## 2020-03-12 NOTE — ASSESSMENT & PLAN NOTE
Status post hospitalization patient ejection fraction a 20-30% with the decrease the on the wall motion and the severe left ventricular dysfunction patient on Coreg mononitrate isosorbide and Plavix and patient will follow up with the Cardiology appointment the and on March 12 a discussed the patient important compliant with the medication and follow-up with the appointment

## 2020-03-12 NOTE — ASSESSMENT & PLAN NOTE
Status post hospitalization status post 4 unit of red blood cell transfusion his hemoglobin above 7 4 on the discharge patient on dialysis 3 times a day he is already on Epogen shot plan to recheck CBC

## 2020-03-12 NOTE — ASSESSMENT & PLAN NOTE
A chronic uncontrolled encouraged patient to lose weight low carb diet smaller portion low-fat diet discussed with the patient also increase physical activity

## 2020-03-12 NOTE — ASSESSMENT & PLAN NOTE
A most probably secondary to the cardiogenic shock liver enzyme is improving the plan to repeat a LFT in 1 week

## 2020-03-12 NOTE — ASSESSMENT & PLAN NOTE
The history of amputation secondary to osteomyelitis secondary to uncontrolled diabetic patient patient does follow up with the podiatric periodically

## 2020-03-21 DIAGNOSIS — E78.2 MIXED HYPERLIPIDEMIA: ICD-10-CM

## 2020-03-22 RX ORDER — SIMVASTATIN 10 MG
TABLET ORAL
Qty: 30 TABLET | Refills: 2 | OUTPATIENT
Start: 2020-03-22

## 2020-03-29 DIAGNOSIS — E78.2 MIXED HYPERLIPIDEMIA: ICD-10-CM

## 2020-03-30 RX ORDER — SIMVASTATIN 10 MG
TABLET ORAL
Qty: 30 TABLET | Refills: 2 | OUTPATIENT
Start: 2020-03-30

## 2020-03-31 DIAGNOSIS — E78.2 MIXED HYPERLIPIDEMIA: Primary | ICD-10-CM

## 2020-03-31 RX ORDER — PRAVASTATIN SODIUM 40 MG
40 TABLET ORAL DAILY
Qty: 30 TABLET | Refills: 2 | Status: SHIPPED | OUTPATIENT
Start: 2020-03-31 | End: 2020-10-27 | Stop reason: SDDI

## 2020-04-14 ENCOUNTER — OFFICE VISIT (OUTPATIENT)
Dept: URGENT CARE | Age: 55
End: 2020-04-14
Payer: COMMERCIAL

## 2020-04-14 ENCOUNTER — APPOINTMENT (OUTPATIENT)
Dept: RADIOLOGY | Age: 55
End: 2020-04-14
Payer: COMMERCIAL

## 2020-04-14 VITALS
BODY MASS INDEX: 31.59 KG/M2 | SYSTOLIC BLOOD PRESSURE: 113 MMHG | TEMPERATURE: 98.1 F | WEIGHT: 233.2 LBS | RESPIRATION RATE: 20 BRPM | DIASTOLIC BLOOD PRESSURE: 68 MMHG | HEART RATE: 77 BPM | HEIGHT: 72 IN | OXYGEN SATURATION: 99 %

## 2020-04-14 DIAGNOSIS — M79.601 RIGHT ARM PAIN: ICD-10-CM

## 2020-04-14 DIAGNOSIS — M79.601 RIGHT ARM PAIN: Primary | ICD-10-CM

## 2020-04-14 PROCEDURE — 99283 EMERGENCY DEPT VISIT LOW MDM: CPT | Performed by: PHYSICIAN ASSISTANT

## 2020-04-14 PROCEDURE — 29125 APPL SHORT ARM SPLINT STATIC: CPT | Performed by: PHYSICIAN ASSISTANT

## 2020-04-14 PROCEDURE — 73090 X-RAY EXAM OF FOREARM: CPT

## 2020-04-14 PROCEDURE — G0382 LEV 3 HOSP TYPE B ED VISIT: HCPCS | Performed by: PHYSICIAN ASSISTANT

## 2020-04-14 PROCEDURE — 73130 X-RAY EXAM OF HAND: CPT

## 2020-04-14 RX ORDER — ACETAMINOPHEN 325 MG/1
650 TABLET ORAL EVERY 8 HOURS PRN
Qty: 12 TABLET | Refills: 0 | Status: SHIPPED | OUTPATIENT
Start: 2020-04-14 | End: 2020-04-14

## 2020-04-14 RX ORDER — ACETAMINOPHEN AND CODEINE PHOSPHATE 300; 30 MG/1; MG/1
1 TABLET ORAL EVERY 6 HOURS PRN
Qty: 16 TABLET | Refills: 0 | Status: SHIPPED | OUTPATIENT
Start: 2020-04-14 | End: 2020-06-11 | Stop reason: ALTCHOICE

## 2020-05-05 DIAGNOSIS — E11.621 TYPE 2 DIABETES MELLITUS WITH FOOT ULCER, WITH LONG-TERM CURRENT USE OF INSULIN (HCC): Primary | ICD-10-CM

## 2020-05-05 DIAGNOSIS — Z79.4 TYPE 2 DIABETES MELLITUS WITH FOOT ULCER, WITH LONG-TERM CURRENT USE OF INSULIN (HCC): Primary | ICD-10-CM

## 2020-05-05 DIAGNOSIS — L97.509 TYPE 2 DIABETES MELLITUS WITH FOOT ULCER, WITH LONG-TERM CURRENT USE OF INSULIN (HCC): Primary | ICD-10-CM

## 2020-05-06 RX ORDER — GUAIFENESIN 400 MG
TABLET ORAL 3 TIMES DAILY
Qty: 30 EACH | Refills: 5 | Status: SHIPPED | OUTPATIENT
Start: 2020-05-06

## 2020-05-06 RX ORDER — LANCETS
EACH MISCELLANEOUS
Qty: 100 EACH | Refills: 2 | Status: SHIPPED | OUTPATIENT
Start: 2020-05-06

## 2020-05-06 RX ORDER — PEN NEEDLE, DIABETIC 31 GX5/16"
NEEDLE, DISPOSABLE MISCELLANEOUS 3 TIMES DAILY
Qty: 100 EACH | Refills: 2 | Status: SHIPPED | OUTPATIENT
Start: 2020-05-06

## 2020-06-09 ENCOUNTER — APPOINTMENT (OUTPATIENT)
Dept: LAB | Facility: HOSPITAL | Age: 55
End: 2020-06-09
Payer: COMMERCIAL

## 2020-06-09 DIAGNOSIS — N18.2 STAGE 2 CHRONIC KIDNEY DISEASE: ICD-10-CM

## 2020-06-09 DIAGNOSIS — D69.6 THROMBOCYTOPENIA (HCC): ICD-10-CM

## 2020-06-09 DIAGNOSIS — E11.51 TYPE II DIABETES MELLITUS WITH PERIPHERAL CIRCULATORY DISORDER (HCC): ICD-10-CM

## 2020-06-09 DIAGNOSIS — I10 ESSENTIAL HYPERTENSION: ICD-10-CM

## 2020-06-09 DIAGNOSIS — R74.01 TRANSAMINITIS: ICD-10-CM

## 2020-06-09 DIAGNOSIS — N18.6 ESRD (END STAGE RENAL DISEASE) (HCC): ICD-10-CM

## 2020-06-09 DIAGNOSIS — E78.2 MIXED HYPERLIPIDEMIA: ICD-10-CM

## 2020-06-09 LAB
ALBUMIN SERPL BCP-MCNC: 4.1 G/DL (ref 3–5.2)
ALP SERPL-CCNC: 73 U/L (ref 43–122)
ALT SERPL W P-5'-P-CCNC: 20 U/L (ref 9–52)
ANION GAP SERPL CALCULATED.3IONS-SCNC: 12 MMOL/L (ref 5–14)
ANISOCYTOSIS BLD QL SMEAR: PRESENT
AST SERPL W P-5'-P-CCNC: 29 U/L (ref 17–59)
BASOPHILS # BLD AUTO: 0.1 THOUSANDS/ΜL (ref 0–0.1)
BASOPHILS NFR BLD AUTO: 1 % (ref 0–1)
BILIRUB SERPL-MCNC: 0.7 MG/DL
BUN SERPL-MCNC: 18 MG/DL (ref 5–25)
CALCIUM SERPL-MCNC: 9.4 MG/DL (ref 8.4–10.2)
CHLORIDE SERPL-SCNC: 89 MMOL/L (ref 97–108)
CHOLEST SERPL-MCNC: 192 MG/DL
CO2 SERPL-SCNC: 36 MMOL/L (ref 22–30)
CREAT SERPL-MCNC: 6.59 MG/DL (ref 0.7–1.5)
EOSINOPHIL # BLD AUTO: 0.4 THOUSAND/ΜL (ref 0–0.4)
EOSINOPHIL NFR BLD AUTO: 7 % (ref 0–6)
ERYTHROCYTE [DISTWIDTH] IN BLOOD BY AUTOMATED COUNT: 18.7 %
EST. AVERAGE GLUCOSE BLD GHB EST-MCNC: 131 MG/DL
GFR SERPL CREATININE-BSD FRML MDRD: 10 ML/MIN/1.73SQ M
GLUCOSE P FAST SERPL-MCNC: 171 MG/DL (ref 70–99)
HBA1C MFR BLD: 6.2 %
HCT VFR BLD AUTO: 39.7 % (ref 41–53)
HDLC SERPL-MCNC: 37 MG/DL
HGB BLD-MCNC: 13.5 G/DL (ref 13.5–17.5)
LDLC SERPL CALC-MCNC: 132 MG/DL
LYMPHOCYTES # BLD AUTO: 2.6 THOUSANDS/ΜL (ref 0.5–4)
LYMPHOCYTES NFR BLD AUTO: 42 % (ref 25–45)
MACROCYTES BLD QL AUTO: PRESENT
MCH RBC QN AUTO: 34.1 PG (ref 26–34)
MCHC RBC AUTO-ENTMCNC: 34 G/DL (ref 31–36)
MCV RBC AUTO: 100 FL (ref 80–100)
MONOCYTES # BLD AUTO: 0.7 THOUSAND/ΜL (ref 0.2–0.9)
MONOCYTES NFR BLD AUTO: 11 % (ref 1–10)
NEUTROPHILS # BLD AUTO: 2.5 THOUSANDS/ΜL (ref 1.8–7.8)
NEUTS SEG NFR BLD AUTO: 39 % (ref 45–65)
NONHDLC SERPL-MCNC: 155 MG/DL
PLATELET # BLD AUTO: 221 THOUSANDS/UL (ref 150–450)
PLATELET BLD QL SMEAR: ADEQUATE
PMV BLD AUTO: 8.1 FL (ref 8.9–12.7)
POLYCHROMASIA BLD QL SMEAR: PRESENT
POTASSIUM SERPL-SCNC: 3.6 MMOL/L (ref 3.6–5)
PROT SERPL-MCNC: 8.5 G/DL (ref 5.9–8.4)
RBC # BLD AUTO: 3.96 MILLION/UL (ref 4.5–5.9)
RBC MORPH BLD: NORMAL
SODIUM SERPL-SCNC: 137 MMOL/L (ref 137–147)
T4 FREE SERPL-MCNC: 1.43 NG/DL (ref 0.76–1.46)
TRIGL SERPL-MCNC: 117 MG/DL
TSH SERPL DL<=0.05 MIU/L-ACNC: <0.015 UIU/ML (ref 0.47–4.68)
WBC # BLD AUTO: 6.3 THOUSAND/UL (ref 4.5–11)

## 2020-06-09 PROCEDURE — 84439 ASSAY OF FREE THYROXINE: CPT

## 2020-06-09 PROCEDURE — 85025 COMPLETE CBC W/AUTO DIFF WBC: CPT

## 2020-06-09 PROCEDURE — 83036 HEMOGLOBIN GLYCOSYLATED A1C: CPT

## 2020-06-09 PROCEDURE — 84443 ASSAY THYROID STIM HORMONE: CPT

## 2020-06-09 PROCEDURE — 80053 COMPREHEN METABOLIC PANEL: CPT

## 2020-06-09 PROCEDURE — 36415 COLL VENOUS BLD VENIPUNCTURE: CPT

## 2020-06-09 PROCEDURE — 80061 LIPID PANEL: CPT

## 2020-06-11 ENCOUNTER — OFFICE VISIT (OUTPATIENT)
Dept: FAMILY MEDICINE CLINIC | Facility: CLINIC | Age: 55
End: 2020-06-11
Payer: COMMERCIAL

## 2020-06-11 VITALS
RESPIRATION RATE: 16 BRPM | DIASTOLIC BLOOD PRESSURE: 84 MMHG | HEART RATE: 79 BPM | TEMPERATURE: 98.4 F | SYSTOLIC BLOOD PRESSURE: 126 MMHG | HEIGHT: 72 IN | BODY MASS INDEX: 30.61 KG/M2 | WEIGHT: 226 LBS | OXYGEN SATURATION: 91 %

## 2020-06-11 DIAGNOSIS — E11.42 DIABETIC POLYNEUROPATHY ASSOCIATED WITH TYPE 2 DIABETES MELLITUS (HCC): ICD-10-CM

## 2020-06-11 DIAGNOSIS — I25.5 ISCHEMIC DILATED CARDIOMYOPATHY (HCC): ICD-10-CM

## 2020-06-11 DIAGNOSIS — N18.6 CHRONIC KIDNEY DISEASE WITH END STAGE RENAL FAILURE ON DIALYSIS (HCC): ICD-10-CM

## 2020-06-11 DIAGNOSIS — I42.0 ISCHEMIC DILATED CARDIOMYOPATHY (HCC): ICD-10-CM

## 2020-06-11 DIAGNOSIS — N25.81 SECONDARY HYPERPARATHYROIDISM OF RENAL ORIGIN (HCC): ICD-10-CM

## 2020-06-11 DIAGNOSIS — E78.2 MIXED HYPERLIPIDEMIA: ICD-10-CM

## 2020-06-11 DIAGNOSIS — Z91.14 NONCOMPLIANCE WITH MEDICATION REGIMEN: ICD-10-CM

## 2020-06-11 DIAGNOSIS — E11.621 TYPE 2 DIABETES MELLITUS WITH FOOT ULCER, WITHOUT LONG-TERM CURRENT USE OF INSULIN (HCC): ICD-10-CM

## 2020-06-11 DIAGNOSIS — L97.509 TYPE 2 DIABETES MELLITUS WITH FOOT ULCER, WITHOUT LONG-TERM CURRENT USE OF INSULIN (HCC): ICD-10-CM

## 2020-06-11 DIAGNOSIS — D69.6 THROMBOCYTOPENIA (HCC): ICD-10-CM

## 2020-06-11 DIAGNOSIS — Z99.2 CHRONIC KIDNEY DISEASE WITH END STAGE RENAL FAILURE ON DIALYSIS (HCC): ICD-10-CM

## 2020-06-11 DIAGNOSIS — I47.2 PAROXYSMAL VENTRICULAR TACHYCARDIA (HCC): ICD-10-CM

## 2020-06-11 DIAGNOSIS — G56.01 CARPAL TUNNEL SYNDROME OF RIGHT WRIST: Primary | ICD-10-CM

## 2020-06-11 PROBLEM — N18.4 STAGE 4 CHRONIC KIDNEY DISEASE (HCC): Status: RESOLVED | Noted: 2018-12-13 | Resolved: 2020-06-11

## 2020-06-11 PROCEDURE — 2022F DILAT RTA XM EVC RTNOPTHY: CPT | Performed by: FAMILY MEDICINE

## 2020-06-11 PROCEDURE — 3066F NEPHROPATHY DOC TX: CPT | Performed by: FAMILY MEDICINE

## 2020-06-11 PROCEDURE — 99214 OFFICE O/P EST MOD 30 MIN: CPT | Performed by: FAMILY MEDICINE

## 2020-06-11 PROCEDURE — 1036F TOBACCO NON-USER: CPT | Performed by: FAMILY MEDICINE

## 2020-06-11 PROCEDURE — 3044F HG A1C LEVEL LT 7.0%: CPT | Performed by: FAMILY MEDICINE

## 2020-06-12 PROBLEM — Z91.148 NONCOMPLIANCE WITH MEDICATION REGIMEN: Status: ACTIVE | Noted: 2020-06-12

## 2020-06-12 PROBLEM — Z91.148 NONCOMPLIANCE WITH MEDICATION REGIMEN: Status: ACTIVE | Noted: 2020-06-11

## 2020-06-12 PROBLEM — Z91.14 NONCOMPLIANCE WITH MEDICATION REGIMEN: Status: ACTIVE | Noted: 2020-06-12

## 2020-06-12 PROBLEM — D69.6 THROMBOCYTOPENIA (HCC): Status: RESOLVED | Noted: 2020-03-10 | Resolved: 2020-06-12

## 2020-06-12 PROBLEM — Z91.14 NONCOMPLIANCE WITH MEDICATION REGIMEN: Status: ACTIVE | Noted: 2020-06-11

## 2020-06-17 LAB — HCV AB SER-ACNC: NORMAL

## 2020-09-21 ENCOUNTER — TELEPHONE (OUTPATIENT)
Dept: ADMINISTRATIVE | Facility: OTHER | Age: 55
End: 2020-09-21

## 2020-09-21 NOTE — TELEPHONE ENCOUNTER
----- Message from Annika Palencia sent at 9/21/2020  9:30 AM EDT -----  Regarding: hep c  09/21/20 9:30 AM    Hello, our patient David Ponce  has had Hepatitis C completed/performed  Please assist in updating the patient chart by pulling the Care Everywhere (CE) document  The date of service is 06/17/2020       Thank you,  [id number] [place] [phone]

## 2020-09-22 NOTE — TELEPHONE ENCOUNTER
Upon review of the In Basket request we were able to locate, review, and update the patient chart as requested for Hepatitis C   Any additional questions or concerns should be emailed to the Practice Liaisons via [email or web address] email, please do not reply via In Basket      Thank you  Ira Flores

## 2020-10-13 ENCOUNTER — HOSPITAL ENCOUNTER (OUTPATIENT)
Dept: NEUROLOGY | Facility: CLINIC | Age: 55
Discharge: HOME | End: 2020-10-13
Payer: COMMERCIAL

## 2020-10-13 DIAGNOSIS — G56.01 CARPAL TUNNEL SYNDROME OF RIGHT WRIST: ICD-10-CM

## 2020-10-13 PROCEDURE — 95886 MUSC TEST DONE W/N TEST COMP: CPT | Performed by: PSYCHIATRY & NEUROLOGY

## 2020-10-13 PROCEDURE — 95909 NRV CNDJ TST 5-6 STUDIES: CPT | Performed by: PSYCHIATRY & NEUROLOGY

## 2020-10-15 ENCOUNTER — TELEPHONE (OUTPATIENT)
Dept: FAMILY MEDICINE CLINIC | Facility: CLINIC | Age: 55
End: 2020-10-15

## 2020-10-27 ENCOUNTER — OFFICE VISIT (OUTPATIENT)
Dept: FAMILY MEDICINE CLINIC | Facility: CLINIC | Age: 55
End: 2020-10-27
Payer: COMMERCIAL

## 2020-10-27 DIAGNOSIS — Z28.21 IMMUNIZATION REFUSED: ICD-10-CM

## 2020-10-27 DIAGNOSIS — Z00.01 ENCOUNTER FOR WELL ADULT EXAM WITH ABNORMAL FINDINGS: Primary | ICD-10-CM

## 2020-10-27 DIAGNOSIS — G56.01 CARPAL TUNNEL SYNDROME OF RIGHT WRIST: ICD-10-CM

## 2020-10-27 DIAGNOSIS — E87.6 HYPOKALEMIA: ICD-10-CM

## 2020-10-27 DIAGNOSIS — E78.2 MIXED HYPERLIPIDEMIA: ICD-10-CM

## 2020-10-27 DIAGNOSIS — E11.42 DIABETIC POLYNEUROPATHY ASSOCIATED WITH TYPE 2 DIABETES MELLITUS (HCC): ICD-10-CM

## 2020-10-27 DIAGNOSIS — I10 ESSENTIAL HYPERTENSION: ICD-10-CM

## 2020-10-27 DIAGNOSIS — D63.8 ANEMIA IN OTHER CHRONIC DISEASES CLASSIFIED ELSEWHERE: ICD-10-CM

## 2020-10-27 DIAGNOSIS — Z99.2 DEPENDENCE ON RENAL DIALYSIS (HCC): ICD-10-CM

## 2020-10-27 LAB — SL AMB POCT HEMOGLOBIN AIC: 5.6 (ref ?–6.5)

## 2020-10-27 PROCEDURE — 83036 HEMOGLOBIN GLYCOSYLATED A1C: CPT | Performed by: FAMILY MEDICINE

## 2020-10-27 PROCEDURE — 82570 ASSAY OF URINE CREATININE: CPT | Performed by: FAMILY MEDICINE

## 2020-10-27 PROCEDURE — 99396 PREV VISIT EST AGE 40-64: CPT | Performed by: FAMILY MEDICINE

## 2020-10-27 PROCEDURE — 99214 OFFICE O/P EST MOD 30 MIN: CPT | Performed by: FAMILY MEDICINE

## 2020-10-27 PROCEDURE — 82043 UR ALBUMIN QUANTITATIVE: CPT | Performed by: FAMILY MEDICINE

## 2020-10-28 LAB
CREAT UR-MCNC: 235 MG/DL
MICROALBUMIN UR-MCNC: 620 MG/L (ref 0–20)
MICROALBUMIN/CREAT 24H UR: 264 MG/G CREATININE (ref 0–30)

## 2020-10-29 VITALS
DIASTOLIC BLOOD PRESSURE: 84 MMHG | OXYGEN SATURATION: 98 % | RESPIRATION RATE: 16 BRPM | SYSTOLIC BLOOD PRESSURE: 124 MMHG | BODY MASS INDEX: 32.1 KG/M2 | TEMPERATURE: 97.7 F | HEIGHT: 72 IN | WEIGHT: 237 LBS | HEART RATE: 66 BPM

## 2020-10-29 PROBLEM — D62 ACUTE POSTHEMORRHAGIC ANEMIA: Status: ACTIVE | Noted: 2020-02-21

## 2020-10-29 PROBLEM — D63.1 ANEMIA IN CHRONIC KIDNEY DISEASE: Status: ACTIVE | Noted: 2020-01-29

## 2020-10-29 PROBLEM — Z28.21 IMMUNIZATION REFUSED: Status: ACTIVE | Noted: 2020-10-29

## 2020-10-29 PROBLEM — N18.9 ANEMIA IN CHRONIC KIDNEY DISEASE: Status: ACTIVE | Noted: 2020-01-29

## 2020-10-29 PROBLEM — Z99.2 DEPENDENCE ON RENAL DIALYSIS (HCC): Status: ACTIVE | Noted: 2020-02-21

## 2020-12-01 ENCOUNTER — TELEPHONE (OUTPATIENT)
Dept: OBGYN CLINIC | Facility: MEDICAL CENTER | Age: 55
End: 2020-12-01

## 2020-12-01 ENCOUNTER — OFFICE VISIT (OUTPATIENT)
Dept: OBGYN CLINIC | Facility: MEDICAL CENTER | Age: 55
End: 2020-12-01
Payer: COMMERCIAL

## 2020-12-01 VITALS
BODY MASS INDEX: 32.1 KG/M2 | TEMPERATURE: 97.8 F | HEIGHT: 72 IN | SYSTOLIC BLOOD PRESSURE: 157 MMHG | WEIGHT: 237 LBS | HEART RATE: 71 BPM | DIASTOLIC BLOOD PRESSURE: 81 MMHG

## 2020-12-01 DIAGNOSIS — G56.01 CARPAL TUNNEL SYNDROME OF RIGHT WRIST: ICD-10-CM

## 2020-12-01 PROCEDURE — 99244 OFF/OP CNSLTJ NEW/EST MOD 40: CPT | Performed by: ORTHOPAEDIC SURGERY

## 2020-12-01 RX ORDER — LIDOCAINE HYDROCHLORIDE AND EPINEPHRINE 10; 10 MG/ML; UG/ML
20 INJECTION, SOLUTION INFILTRATION; PERINEURAL
Status: CANCELLED | OUTPATIENT
Start: 2020-12-01 | End: 2020-12-02

## 2021-04-13 DIAGNOSIS — E78.2 MIXED HYPERLIPIDEMIA: ICD-10-CM

## 2021-04-13 DIAGNOSIS — E11.51 DIABETES MELLITUS WITH PERIPHERAL CIRCULATORY DISORDER (HCC): Primary | ICD-10-CM

## 2021-08-05 ENCOUNTER — APPOINTMENT (OUTPATIENT)
Dept: LAB | Facility: HOSPITAL | Age: 56
End: 2021-08-05
Payer: COMMERCIAL

## 2021-08-05 DIAGNOSIS — E78.2 MIXED HYPERLIPIDEMIA: ICD-10-CM

## 2021-08-05 DIAGNOSIS — E11.51 DIABETES MELLITUS WITH PERIPHERAL CIRCULATORY DISORDER (HCC): ICD-10-CM

## 2021-08-05 LAB
ALBUMIN SERPL BCP-MCNC: 4.2 G/DL (ref 3–5.2)
ALP SERPL-CCNC: 48 U/L (ref 43–122)
ALT SERPL W P-5'-P-CCNC: 8 U/L
ANION GAP SERPL CALCULATED.3IONS-SCNC: 13 MMOL/L (ref 5–14)
AST SERPL W P-5'-P-CCNC: 23 U/L (ref 17–59)
BASOPHILS # BLD AUTO: 0.06 THOUSAND/UL (ref 0–0.1)
BASOPHILS NFR MAR MANUAL: 1 % (ref 0–1)
BILIRUB SERPL-MCNC: 0.7 MG/DL
BUN SERPL-MCNC: 27 MG/DL (ref 5–25)
CALCIUM SERPL-MCNC: 8.8 MG/DL (ref 8.4–10.2)
CHLORIDE SERPL-SCNC: 94 MMOL/L (ref 97–108)
CHOLEST SERPL-MCNC: 195 MG/DL
CO2 SERPL-SCNC: 32 MMOL/L (ref 22–30)
CREAT SERPL-MCNC: 7.47 MG/DL (ref 0.7–1.5)
EOSINOPHIL # BLD AUTO: 0.24 THOUSAND/UL (ref 0–0.4)
EOSINOPHIL NFR BLD MANUAL: 4 % (ref 0–6)
ERYTHROCYTE [DISTWIDTH] IN BLOOD BY AUTOMATED COUNT: 15 %
EST. AVERAGE GLUCOSE BLD GHB EST-MCNC: 111 MG/DL
GFR SERPL CREATININE-BSD FRML MDRD: 8 ML/MIN/1.73SQ M
GLUCOSE P FAST SERPL-MCNC: 117 MG/DL (ref 70–99)
HBA1C MFR BLD: 5.5 %
HCT VFR BLD AUTO: 41 % (ref 41–53)
HDLC SERPL-MCNC: 37 MG/DL
HGB BLD-MCNC: 13.7 G/DL (ref 13.5–17.5)
LDLC SERPL CALC-MCNC: 140 MG/DL
LYMPHOCYTES # BLD AUTO: 2.38 THOUSAND/UL (ref 0.5–4)
LYMPHOCYTES # BLD AUTO: 39 % (ref 25–45)
MACROCYTES BLD QL AUTO: PRESENT
MCH RBC QN AUTO: 36 PG (ref 26–34)
MCHC RBC AUTO-ENTMCNC: 33.5 G/DL (ref 31–36)
MCV RBC AUTO: 107 FL (ref 80–100)
MONOCYTES # BLD AUTO: 0.73 THOUSAND/UL (ref 0.2–0.9)
MONOCYTES NFR BLD AUTO: 12 % (ref 1–10)
NEUTS SEG # BLD: 2.68 THOUSAND/UL (ref 1.8–7.8)
NEUTS SEG NFR BLD AUTO: 44 %
PLATELET # BLD AUTO: 169 THOUSANDS/UL (ref 150–450)
PLATELET BLD QL SMEAR: ADEQUATE
PMV BLD AUTO: 10.2 FL (ref 8.9–12.7)
POTASSIUM SERPL-SCNC: 4.7 MMOL/L (ref 3.6–5)
PROT SERPL-MCNC: 8 G/DL (ref 5.9–8.4)
RBC # BLD AUTO: 3.82 MILLION/UL (ref 4.5–5.9)
RBC MORPH BLD: ABNORMAL
SODIUM SERPL-SCNC: 139 MMOL/L (ref 137–147)
TOTAL CELLS COUNTED SPEC: 100
TRIGL SERPL-MCNC: 91 MG/DL
TSH SERPL DL<=0.05 MIU/L-ACNC: 1.41 UIU/ML (ref 0.47–4.68)
WBC # BLD AUTO: 6.1 THOUSAND/UL (ref 4.5–11)

## 2021-08-05 PROCEDURE — 80061 LIPID PANEL: CPT

## 2021-08-05 PROCEDURE — 83036 HEMOGLOBIN GLYCOSYLATED A1C: CPT

## 2021-08-05 PROCEDURE — 36415 COLL VENOUS BLD VENIPUNCTURE: CPT

## 2021-08-05 PROCEDURE — 85027 COMPLETE CBC AUTOMATED: CPT

## 2021-08-05 PROCEDURE — 80053 COMPREHEN METABOLIC PANEL: CPT

## 2021-08-05 PROCEDURE — 85007 BL SMEAR W/DIFF WBC COUNT: CPT

## 2021-08-05 PROCEDURE — 84443 ASSAY THYROID STIM HORMONE: CPT

## 2021-08-10 ENCOUNTER — OFFICE VISIT (OUTPATIENT)
Dept: FAMILY MEDICINE CLINIC | Facility: CLINIC | Age: 56
End: 2021-08-10
Payer: COMMERCIAL

## 2021-08-10 VITALS
OXYGEN SATURATION: 96 % | BODY MASS INDEX: 31.97 KG/M2 | HEART RATE: 80 BPM | SYSTOLIC BLOOD PRESSURE: 110 MMHG | DIASTOLIC BLOOD PRESSURE: 60 MMHG | WEIGHT: 236 LBS | TEMPERATURE: 96.5 F | HEIGHT: 72 IN

## 2021-08-10 DIAGNOSIS — E78.2 MIXED HYPERLIPIDEMIA: ICD-10-CM

## 2021-08-10 DIAGNOSIS — E78.5 DYSLIPIDEMIA, GOAL LDL BELOW 70: ICD-10-CM

## 2021-08-10 DIAGNOSIS — N18.6 CHRONIC KIDNEY DISEASE WITH END STAGE RENAL FAILURE ON DIALYSIS (HCC): ICD-10-CM

## 2021-08-10 DIAGNOSIS — Z99.2 DEPENDENCE ON RENAL DIALYSIS (HCC): ICD-10-CM

## 2021-08-10 DIAGNOSIS — E11.42 DIABETIC POLYNEUROPATHY ASSOCIATED WITH TYPE 2 DIABETES MELLITUS (HCC): ICD-10-CM

## 2021-08-10 DIAGNOSIS — F33.0 MILD EPISODE OF RECURRENT MAJOR DEPRESSIVE DISORDER (HCC): ICD-10-CM

## 2021-08-10 DIAGNOSIS — E11.51 DIABETES MELLITUS WITH PERIPHERAL CIRCULATORY DISORDER (HCC): ICD-10-CM

## 2021-08-10 DIAGNOSIS — Z79.899 MEDICAL MARIJUANA USE: ICD-10-CM

## 2021-08-10 DIAGNOSIS — Z99.2 CHRONIC KIDNEY DISEASE WITH END STAGE RENAL FAILURE ON DIALYSIS (HCC): ICD-10-CM

## 2021-08-10 PROCEDURE — 99214 OFFICE O/P EST MOD 30 MIN: CPT | Performed by: FAMILY MEDICINE

## 2021-08-10 RX ORDER — LIDOCAINE AND PRILOCAINE 25; 25 MG/G; MG/G
CREAM TOPICAL
COMMUNITY
Start: 2021-08-04

## 2021-08-10 RX ORDER — ATORVASTATIN CALCIUM 20 MG/1
20 TABLET, FILM COATED ORAL DAILY
Qty: 30 TABLET | Refills: 2 | Status: SHIPPED | OUTPATIENT
Start: 2021-08-10 | End: 2021-12-02

## 2021-08-10 NOTE — PROGRESS NOTES
BMI Counseling: Body mass index is 32 01 kg/m²  The BMI is above normal  Nutrition recommendations include decreasing portion sizes, decreasing fast food intake and limiting drinks that contain sugar  Exercise recommendations include exercising 3-5 times per week  No pharmacotherapy was ordered  Subjective:   Chief Complaint   Patient presents with    Follow-up     chronic conditions        Patient ID: Gail Morris  is a 61 y o  male  The patient here follow-up with a chronic condition patient history of non-insulin-dependent diabetic try to controlled with the low carb diet deny increased thirsty increased frequency urination no dizziness no headache and no abdomen pain patient had multiple complication of diabetic including neuropathy nephropathy and peripheral vascular disease patient had amputation on the toe and he had history of foot ulcer for what he been following up with the podiatric patient history of chronic kidney disease on dialysis the a 3 times a week deny any abdomen pain no flank pain no blood in the urine and he does have history of hyperlipidemia not on any medication he did stop it while ago and deny any chest pain short of breath no palpitation patient was history of depression and the recently start the marijuana by different provider as the midshin all treatment for the depression tolerated well an symptom improved blood work review with the patient      The following portions of the patient's history were reviewed and updated as appropriate: allergies, current medications, past family history, past medical history, past social history, past surgical history and problem list     Review of Systems   Constitutional: Negative for activity change, appetite change, fatigue and fever  HENT: Negative for congestion, ear pain, sinus pressure, sinus pain and sore throat  Eyes: Negative for pain, discharge, redness and itching     Respiratory: Negative for cough, chest tightness, shortness of breath and stridor  Cardiovascular: Negative for chest pain, palpitations and leg swelling  Gastrointestinal: Negative for abdominal pain, blood in stool, constipation, diarrhea and nausea  Genitourinary: Negative for dysuria, flank pain, frequency and hematuria  Musculoskeletal: Negative for back pain, joint swelling and neck pain  Skin: Negative for pallor and rash  Neurological: Negative for dizziness, tremors, weakness, numbness and headaches  Hematological: Does not bruise/bleed easily  Objective:  Vitals:    08/10/21 0941   BP: 110/60   Pulse: 80   Temp: (!) 96 5 °F (35 8 °C)   TempSrc: Tympanic   SpO2: 96%   Weight: 107 kg (236 lb)   Height: 6' (1 829 m)      Physical Exam  Vitals and nursing note reviewed  Constitutional:       General: He is not in acute distress  Appearance: Normal appearance  He is well-developed  He is not diaphoretic  HENT:      Head: Normocephalic  Right Ear: Tympanic membrane, ear canal and external ear normal       Left Ear: Tympanic membrane, ear canal and external ear normal       Nose: Nose normal  No congestion or rhinorrhea  Mouth/Throat:      Mouth: Mucous membranes are moist       Pharynx: Oropharynx is clear  No oropharyngeal exudate or posterior oropharyngeal erythema  Eyes:      General:         Right eye: No discharge  Left eye: No discharge  Conjunctiva/sclera: Conjunctivae normal    Neck:      Vascular: No JVD  Cardiovascular:      Rate and Rhythm: Normal rate and regular rhythm  Heart sounds: Normal heart sounds  No murmur heard  No gallop  Pulmonary:      Effort: Pulmonary effort is normal  No respiratory distress  Breath sounds: Normal breath sounds  No stridor  No wheezing or rales  Chest:      Chest wall: No tenderness  Abdominal:      General: There is no distension  Palpations: Abdomen is soft  There is no mass  Tenderness:  There is no abdominal tenderness  There is no rebound  Musculoskeletal:         General: No tenderness  Normal range of motion  Cervical back: Normal range of motion and neck supple  Lymphadenopathy:      Cervical: No cervical adenopathy  Skin:     General: Skin is warm  Findings: No erythema or rash  Neurological:      Mental Status: He is alert and oriented to person, place, and time  Sensory: No sensory deficit        Gait: Gait normal    Psychiatric:         Mood and Affect: Mood normal          Behavior: Behavior normal            Assessment/Plan:    Diabetic neuropathy (HCC)    Lab Results   Component Value Date    HGBA1C 5 5 08/05/2021     A chronic asymptomatic hemoglobin A1c fair controlled patient the try to controlled with the low carb diet currently not on any medication discussed the patient continue low carb diet discussed important lose weight will start the patient on statin today patient does follow up with the podiatric for diabetic foot care    Diabetes mellitus with peripheral circulatory disorder [facility])    Lab Results   Component Value Date    HGBA1C 5 5 08/05/2021    a chronic patient does have a foot the also secondary to uncontrolled diabetic history of infection and peripheral vascular disease he is not on any statin patient high was on it but the stop it before we restart atorvastatin patient continued to follow up with the podiatric periodically    Chronic kidney disease with end stage renal failure on dialysis [facility])  Lab Results   Component Value Date    EGFR 8 (L) 08/05/2021    EGFR 10 (L) 06/09/2020    EGFR 10 (L) 10/30/2019    CREATININE 7 47 (H) 08/05/2021    CREATININE 6 59 (H) 06/09/2020    CREATININE 6 35 (H) 10/30/2019    a chronic patient on dialysis 3 times a week and follow up with the Nephrology periodically discussed important keep will hydration avoid nonsteroidal anti-inflammatory drugs    Dependence on renal dialysis ([address] )   On dialysis 3 times a week Monday once in Friday    Hyperlipidemia   A chronic asymptomatic LDL sub therapeutic in diabetic patient start him on atorvastatin 20 mg once a day proper use and possible side effect discussed with the patient    Recurrent major depressive disorder ([facility] )   A chronic asymptomatic stable patient does use medical marijuana for treat the depression     BMI 32 0-32 9,adult   The BMI is above average  BMI counseling and education was provided to the patient  Nutrition recommendations include reducing portion sizes, decreasing overall calorie intake, 3-5 servings of fruits/vegetables daily, reducing fast food intake, consuming healthier snacks, decreasing soda and/or juice intake, moderation in carbohydrate intake and reducing intake of saturated fat and trans fat  Exercise recommendations include moderate aerobic physical activity for 150 minutes/week, exercising 3-5 times per week and joining a gym  Medical marijuana use   Patient has been evaluated by other provider for his depression and recommend murmur 1 to treated depression patient tolerated well        Diagnoses and all orders for this visit:    BMI 32 0-32 9,adult    Chronic kidney disease with end stage renal failure on dialysis ([place] [id number] )    Dyslipidemia, goal LDL below 70  -     atorvastatin (LIPITOR) 20 mg tablet; Take 1 tablet (20 mg total) by mouth daily  -     Lipid Panel with Direct LDL reflex;  Future    Dependence on renal dialysis (HCC)    Mild episode of recurrent major depressive disorder ([facility] )    Diabetic polyneuropathy associated with type 2 diabetes mellitus ([facility] )    Diabetes mellitus with peripheral circulatory disorder ([facility] )    Mixed hyperlipidemia    Medical marijuana use    Other orders  -     lidocaine-prilocaine (EMLA) cream; APPLY 1/2 HOUR PRIOR TO DIALYSIS AS DIRECTED

## 2021-08-13 PROBLEM — Z79.899 MEDICAL MARIJUANA USE: Status: ACTIVE | Noted: 2021-08-10

## 2021-08-13 PROBLEM — Z79.899 MEDICAL MARIJUANA USE: Status: ACTIVE | Noted: 2021-08-13

## 2021-08-13 NOTE — ASSESSMENT & PLAN NOTE
A chronic asymptomatic LDL sub therapeutic in diabetic patient start him on atorvastatin 20 mg once a day proper use and possible side effect discussed with the patient

## 2021-08-13 NOTE — ASSESSMENT & PLAN NOTE
Lab Results   Component Value Date    EGFR 8 (L) 08/05/2021    EGFR 10 (L) 06/09/2020    EGFR 10 (L) 10/30/2019    CREATININE 7 47 (H) 08/05/2021    CREATININE 6 59 (H) 06/09/2020    CREATININE 6 35 (H) 10/30/2019    a chronic patient on dialysis 3 times a week and follow up with the Nephrology periodically discussed important keep will hydration avoid nonsteroidal anti-inflammatory drugs

## 2021-08-13 NOTE — ASSESSMENT & PLAN NOTE
Lab Results   Component Value Date    HGBA1C 5 5 08/05/2021     A chronic asymptomatic hemoglobin A1c fair controlled patient the try to controlled with the low carb diet currently not on any medication discussed the patient continue low carb diet discussed important lose weight will start the patient on statin today patient does follow up with the podiatric for diabetic foot care

## 2021-08-13 NOTE — ASSESSMENT & PLAN NOTE
Lab Results   Component Value Date    HGBA1C 5 5 08/05/2021    a chronic patient does have a foot the also secondary to uncontrolled diabetic history of infection and peripheral vascular disease he is not on any statin patient high was on it but the stop it before we restart atorvastatin patient continued to follow up with the podiatric periodically

## 2021-11-01 ENCOUNTER — APPOINTMENT (EMERGENCY)
Dept: RADIOLOGY | Facility: HOSPITAL | Age: 56
End: 2021-11-01
Payer: COMMERCIAL

## 2021-11-01 ENCOUNTER — HOSPITAL ENCOUNTER (OUTPATIENT)
Facility: HOSPITAL | Age: 56
Setting detail: OBSERVATION
Discharge: HOME | End: 2021-11-02
Attending: EMERGENCY MEDICINE | Admitting: INTERNAL MEDICINE
Payer: COMMERCIAL

## 2021-11-01 DIAGNOSIS — N18.6 ESRD (END STAGE RENAL DISEASE) (HCC): ICD-10-CM

## 2021-11-01 DIAGNOSIS — R94.31 EKG ABNORMALITY: ICD-10-CM

## 2021-11-01 DIAGNOSIS — R06.00 DYSPNEA: ICD-10-CM

## 2021-11-01 DIAGNOSIS — R07.9 CHEST PAIN: Primary | ICD-10-CM

## 2021-11-01 DIAGNOSIS — R77.8 ELEVATED TROPONIN: ICD-10-CM

## 2021-11-01 DIAGNOSIS — I42.0 CARDIOMYOPATHY, DILATED (HCC): ICD-10-CM

## 2021-11-01 LAB
ANION GAP SERPL CALCULATED.3IONS-SCNC: 7 MMOL/L (ref 4–13)
BASOPHILS # BLD AUTO: 0.03 THOUSANDS/ΜL (ref 0–0.1)
BASOPHILS NFR BLD AUTO: 1 % (ref 0–1)
BUN SERPL-MCNC: 18 MG/DL (ref 5–25)
CALCIUM SERPL-MCNC: 8.8 MG/DL (ref 8.3–10.1)
CHLORIDE SERPL-SCNC: 101 MMOL/L (ref 100–108)
CO2 SERPL-SCNC: 32 MMOL/L (ref 21–32)
CREAT SERPL-MCNC: 6.13 MG/DL (ref 0.6–1.3)
EOSINOPHIL # BLD AUTO: 0.15 THOUSAND/ΜL (ref 0–0.61)
EOSINOPHIL NFR BLD AUTO: 2 % (ref 0–6)
ERYTHROCYTE [DISTWIDTH] IN BLOOD BY AUTOMATED COUNT: 12.5 % (ref 11.6–15.1)
GFR SERPL CREATININE-BSD FRML MDRD: 11 ML/MIN/1.73SQ M
GLUCOSE SERPL-MCNC: 106 MG/DL (ref 65–140)
HCT VFR BLD AUTO: 30 % (ref 36.5–49.3)
HGB BLD-MCNC: 10.1 G/DL (ref 12–17)
IMM GRANULOCYTES # BLD AUTO: 0.02 THOUSAND/UL (ref 0–0.2)
IMM GRANULOCYTES NFR BLD AUTO: 0 % (ref 0–2)
LYMPHOCYTES # BLD AUTO: 2.14 THOUSANDS/ΜL (ref 0.6–4.47)
LYMPHOCYTES NFR BLD AUTO: 33 % (ref 14–44)
MCH RBC QN AUTO: 35.4 PG (ref 26.8–34.3)
MCHC RBC AUTO-ENTMCNC: 33.7 G/DL (ref 31.4–37.4)
MCV RBC AUTO: 105 FL (ref 82–98)
MONOCYTES # BLD AUTO: 0.86 THOUSAND/ΜL (ref 0.17–1.22)
MONOCYTES NFR BLD AUTO: 13 % (ref 4–12)
NEUTROPHILS # BLD AUTO: 3.22 THOUSANDS/ΜL (ref 1.85–7.62)
NEUTS SEG NFR BLD AUTO: 51 % (ref 43–75)
NRBC BLD AUTO-RTO: 0 /100 WBCS
NT-PROBNP SERPL-MCNC: ABNORMAL PG/ML
PLATELET # BLD AUTO: 129 THOUSANDS/UL (ref 149–390)
POTASSIUM SERPL-SCNC: 4.2 MMOL/L (ref 3.5–5.3)
RBC # BLD AUTO: 2.85 MILLION/UL (ref 3.88–5.62)
SODIUM SERPL-SCNC: 140 MMOL/L (ref 136–145)
TROPONIN I SERPL-MCNC: 0.1 NG/ML
WBC # BLD AUTO: 6.42 THOUSAND/UL (ref 4.31–10.16)

## 2021-11-01 PROCEDURE — 84484 ASSAY OF TROPONIN QUANT: CPT | Performed by: STUDENT IN AN ORGANIZED HEALTH CARE EDUCATION/TRAINING PROGRAM

## 2021-11-01 PROCEDURE — 80048 BASIC METABOLIC PNL TOTAL CA: CPT | Performed by: STUDENT IN AN ORGANIZED HEALTH CARE EDUCATION/TRAINING PROGRAM

## 2021-11-01 PROCEDURE — 93005 ELECTROCARDIOGRAM TRACING: CPT

## 2021-11-01 PROCEDURE — 71045 X-RAY EXAM CHEST 1 VIEW: CPT

## 2021-11-01 PROCEDURE — 99285 EMERGENCY DEPT VISIT HI MDM: CPT

## 2021-11-01 PROCEDURE — 85025 COMPLETE CBC W/AUTO DIFF WBC: CPT | Performed by: STUDENT IN AN ORGANIZED HEALTH CARE EDUCATION/TRAINING PROGRAM

## 2021-11-01 PROCEDURE — 36415 COLL VENOUS BLD VENIPUNCTURE: CPT | Performed by: STUDENT IN AN ORGANIZED HEALTH CARE EDUCATION/TRAINING PROGRAM

## 2021-11-01 PROCEDURE — 99285 EMERGENCY DEPT VISIT HI MDM: CPT | Performed by: EMERGENCY MEDICINE

## 2021-11-01 PROCEDURE — 83880 ASSAY OF NATRIURETIC PEPTIDE: CPT | Performed by: STUDENT IN AN ORGANIZED HEALTH CARE EDUCATION/TRAINING PROGRAM

## 2021-11-01 RX ORDER — SODIUM CHLORIDE 9 MG/ML
3 INJECTION INTRAVENOUS
Status: DISCONTINUED | OUTPATIENT
Start: 2021-11-01 | End: 2021-11-02 | Stop reason: HOSPADM

## 2021-11-01 RX ORDER — ASPIRIN 81 MG/1
324 TABLET, CHEWABLE ORAL ONCE
Status: COMPLETED | OUTPATIENT
Start: 2021-11-01 | End: 2021-11-02

## 2021-11-01 RX ORDER — ASPIRIN 325 MG
325 TABLET ORAL ONCE
Status: DISCONTINUED | OUTPATIENT
Start: 2021-11-01 | End: 2021-11-01

## 2021-11-02 ENCOUNTER — TRANSITIONAL CARE MANAGEMENT (OUTPATIENT)
Dept: FAMILY MEDICINE CLINIC | Facility: CLINIC | Age: 56
End: 2021-11-02

## 2021-11-02 VITALS
WEIGHT: 246.91 LBS | RESPIRATION RATE: 16 BRPM | HEIGHT: 72 IN | HEART RATE: 77 BPM | OXYGEN SATURATION: 100 % | BODY MASS INDEX: 33.44 KG/M2 | DIASTOLIC BLOOD PRESSURE: 74 MMHG | TEMPERATURE: 98.1 F | SYSTOLIC BLOOD PRESSURE: 142 MMHG

## 2021-11-02 PROBLEM — R07.9 CHEST PAIN: Status: ACTIVE | Noted: 2021-11-02

## 2021-11-02 PROBLEM — N18.6 ESRD (END STAGE RENAL DISEASE) (HCC): Status: ACTIVE | Noted: 2021-11-02

## 2021-11-02 PROBLEM — R07.89 ATYPICAL CHEST PAIN: Status: ACTIVE | Noted: 2021-11-02

## 2021-11-02 LAB
ATRIAL RATE: 49 BPM
ATRIAL RATE: 69 BPM
ATRIAL RATE: 76 BPM
P AXIS: 38 DEGREES
P AXIS: 44 DEGREES
P AXIS: 47 DEGREES
PR INTERVAL: 190 MS
PR INTERVAL: 198 MS
PR INTERVAL: 204 MS
QRS AXIS: 46 DEGREES
QRS AXIS: 60 DEGREES
QRS AXIS: 67 DEGREES
QRSD INTERVAL: 88 MS
QRSD INTERVAL: 92 MS
QRSD INTERVAL: 92 MS
QT INTERVAL: 366 MS
QT INTERVAL: 378 MS
QT INTERVAL: 416 MS
QTC INTERVAL: 405 MS
QTC INTERVAL: 411 MS
QTC INTERVAL: 477 MS
T WAVE AXIS: -14 DEGREES
T WAVE AXIS: -38 DEGREES
T WAVE AXIS: 134 DEGREES
TROPONIN I SERPL-MCNC: 0.11 NG/ML
TROPONIN I SERPL-MCNC: 0.12 NG/ML
VENTRICULAR RATE: 69 BPM
VENTRICULAR RATE: 76 BPM
VENTRICULAR RATE: 79 BPM

## 2021-11-02 PROCEDURE — 93010 ELECTROCARDIOGRAM REPORT: CPT | Performed by: INTERNAL MEDICINE

## 2021-11-02 PROCEDURE — 99245 OFF/OP CONSLTJ NEW/EST HI 55: CPT | Performed by: INTERNAL MEDICINE

## 2021-11-02 PROCEDURE — 99244 OFF/OP CNSLTJ NEW/EST MOD 40: CPT | Performed by: NURSE PRACTITIONER

## 2021-11-02 PROCEDURE — 84484 ASSAY OF TROPONIN QUANT: CPT | Performed by: PHYSICIAN ASSISTANT

## 2021-11-02 PROCEDURE — 84484 ASSAY OF TROPONIN QUANT: CPT | Performed by: STUDENT IN AN ORGANIZED HEALTH CARE EDUCATION/TRAINING PROGRAM

## 2021-11-02 PROCEDURE — 99236 HOSP IP/OBS SAME DATE HI 85: CPT | Performed by: INTERNAL MEDICINE

## 2021-11-02 PROCEDURE — 93005 ELECTROCARDIOGRAM TRACING: CPT

## 2021-11-02 PROCEDURE — 36415 COLL VENOUS BLD VENIPUNCTURE: CPT | Performed by: STUDENT IN AN ORGANIZED HEALTH CARE EDUCATION/TRAINING PROGRAM

## 2021-11-02 RX ORDER — LOSARTAN POTASSIUM 25 MG/1
25 TABLET ORAL
Status: DISCONTINUED | OUTPATIENT
Start: 2021-11-02 | End: 2021-11-02

## 2021-11-02 RX ORDER — LOSARTAN POTASSIUM 25 MG/1
12.5 TABLET ORAL
Status: DISCONTINUED | OUTPATIENT
Start: 2021-11-02 | End: 2021-11-02 | Stop reason: HOSPADM

## 2021-11-02 RX ORDER — DOXERCALCIFEROL 2 UG/ML
2 INJECTION, SOLUTION INTRAVENOUS 3 TIMES WEEKLY
Status: DISCONTINUED | OUTPATIENT
Start: 2021-11-03 | End: 2021-11-02 | Stop reason: HOSPADM

## 2021-11-02 RX ORDER — LOSARTAN POTASSIUM 25 MG/1
12.5 TABLET ORAL
Qty: 30 TABLET | Refills: 0 | Status: SHIPPED | OUTPATIENT
Start: 2021-11-02 | End: 2022-05-20

## 2021-11-02 RX ORDER — LISINOPRIL 5 MG/1
5 TABLET ORAL DAILY
Status: DISCONTINUED | OUTPATIENT
Start: 2021-11-02 | End: 2021-11-02

## 2021-11-02 RX ORDER — SEVELAMER HYDROCHLORIDE 800 MG/1
800 TABLET, FILM COATED ORAL
Status: DISCONTINUED | OUTPATIENT
Start: 2021-11-02 | End: 2021-11-02 | Stop reason: HOSPADM

## 2021-11-02 RX ORDER — METOPROLOL SUCCINATE 25 MG/1
12.5 TABLET, EXTENDED RELEASE ORAL
Qty: 30 TABLET | Refills: 0 | Status: SHIPPED | OUTPATIENT
Start: 2021-11-02 | End: 2022-03-10 | Stop reason: ALTCHOICE

## 2021-11-02 RX ORDER — METOPROLOL SUCCINATE 25 MG/1
12.5 TABLET, EXTENDED RELEASE ORAL
Status: DISCONTINUED | OUTPATIENT
Start: 2021-11-02 | End: 2021-11-02 | Stop reason: HOSPADM

## 2021-11-02 RX ORDER — LISINOPRIL 5 MG/1
5 TABLET ORAL DAILY
COMMUNITY
End: 2021-11-02

## 2021-11-02 RX ADMIN — ASPIRIN 81 MG CHEWABLE TABLET 324 MG: 81 TABLET CHEWABLE at 00:28

## 2021-11-09 ENCOUNTER — OFFICE VISIT (OUTPATIENT)
Dept: FAMILY MEDICINE CLINIC | Facility: CLINIC | Age: 56
End: 2021-11-09
Payer: COMMERCIAL

## 2021-11-09 VITALS
WEIGHT: 247.6 LBS | BODY MASS INDEX: 33.54 KG/M2 | SYSTOLIC BLOOD PRESSURE: 122 MMHG | RESPIRATION RATE: 16 BRPM | DIASTOLIC BLOOD PRESSURE: 78 MMHG | TEMPERATURE: 97.9 F | HEIGHT: 72 IN

## 2021-11-09 DIAGNOSIS — I10 PRIMARY HYPERTENSION: ICD-10-CM

## 2021-11-09 DIAGNOSIS — E78.2 MIXED HYPERLIPIDEMIA: ICD-10-CM

## 2021-11-09 DIAGNOSIS — I13.10 HYPERTENSIVE HEART AND CHRONIC KIDNEY DISEASE WITHOUT HEART FAILURE, WITH STAGE 1 THROUGH STAGE 4 CHRONIC KIDNEY DISEASE, OR UNSPECIFIED CHRONIC KIDNEY DISEASE: ICD-10-CM

## 2021-11-09 DIAGNOSIS — Z00.01 ENCOUNTER FOR WELL ADULT EXAM WITH ABNORMAL FINDINGS: Primary | ICD-10-CM

## 2021-11-09 DIAGNOSIS — E55.9 VITAMIN D DEFICIENCY: ICD-10-CM

## 2021-11-09 DIAGNOSIS — N18.6 CHRONIC KIDNEY DISEASE WITH END STAGE RENAL FAILURE ON DIALYSIS (HCC): ICD-10-CM

## 2021-11-09 DIAGNOSIS — Z99.2 CHRONIC KIDNEY DISEASE WITH END STAGE RENAL FAILURE ON DIALYSIS (HCC): ICD-10-CM

## 2021-11-09 DIAGNOSIS — R71.0 DROP IN HEMOGLOBIN: ICD-10-CM

## 2021-11-09 DIAGNOSIS — E11.51 DIABETES MELLITUS WITH PERIPHERAL CIRCULATORY DISORDER (HCC): ICD-10-CM

## 2021-11-09 PROBLEM — I50.22 CHRONIC SYSTOLIC CHF (CONGESTIVE HEART FAILURE) (HCC): Status: ACTIVE | Noted: 2021-01-11

## 2021-11-09 PROCEDURE — 99396 PREV VISIT EST AGE 40-64: CPT | Performed by: FAMILY MEDICINE

## 2021-12-01 DIAGNOSIS — E78.5 DYSLIPIDEMIA, GOAL LDL BELOW 70: ICD-10-CM

## 2021-12-02 RX ORDER — ATORVASTATIN CALCIUM 20 MG/1
TABLET, FILM COATED ORAL
Qty: 30 TABLET | Refills: 2 | Status: SHIPPED | OUTPATIENT
Start: 2021-12-02 | End: 2022-06-14

## 2022-01-11 LAB
LEFT EYE DIABETIC RETINOPATHY: NORMAL
RIGHT EYE DIABETIC RETINOPATHY: NORMAL

## 2022-01-26 ENCOUNTER — TELEPHONE (OUTPATIENT)
Dept: FAMILY MEDICINE CLINIC | Facility: CLINIC | Age: 57
End: 2022-01-26

## 2022-01-26 DIAGNOSIS — Z20.822 ENCOUNTER FOR PREOPERATIVE SCREENING LABORATORY TESTING FOR COVID-19 VIRUS: Primary | ICD-10-CM

## 2022-01-26 DIAGNOSIS — Z01.812 ENCOUNTER FOR PREOPERATIVE SCREENING LABORATORY TESTING FOR COVID-19 VIRUS: Primary | ICD-10-CM

## 2022-01-26 NOTE — TELEPHONE ENCOUNTER
sulma from [facility] with [facility] stating patient needs a Covid test on Saturday prior to his procedure needs this done Saturday any questions she can be reached at  [phone] [facility] this can be faxed to fax [phone]

## 2022-01-29 PROCEDURE — U0003 INFECTIOUS AGENT DETECTION BY NUCLEIC ACID (DNA OR RNA); SEVERE ACUTE RESPIRATORY SYNDROME CORONAVIRUS 2 (SARS-COV-2) (CORONAVIRUS DISEASE [COVID-19]), AMPLIFIED PROBE TECHNIQUE, MAKING USE OF HIGH THROUGHPUT TECHNOLOGIES AS DESCRIBED BY CMS-2020-01-R: HCPCS | Performed by: FAMILY MEDICINE

## 2022-01-29 PROCEDURE — U0005 INFEC AGEN DETEC AMPLI PROBE: HCPCS | Performed by: FAMILY MEDICINE

## 2022-02-02 ENCOUNTER — TELEPHONE (OUTPATIENT)
Dept: FAMILY MEDICINE CLINIC | Facility: CLINIC | Age: 57
End: 2022-02-02

## 2022-02-02 DIAGNOSIS — Z20.822 ENCOUNTER FOR PREOPERATIVE SCREENING LABORATORY TESTING FOR COVID-19 VIRUS: Primary | ICD-10-CM

## 2022-02-02 DIAGNOSIS — Z01.812 ENCOUNTER FOR PREOPERATIVE SCREENING LABORATORY TESTING FOR COVID-19 VIRUS: Primary | ICD-10-CM

## 2022-02-08 ENCOUNTER — OFFICE VISIT (OUTPATIENT)
Dept: FAMILY MEDICINE CLINIC | Facility: CLINIC | Age: 57
End: 2022-02-08
Payer: COMMERCIAL

## 2022-02-08 VITALS
TEMPERATURE: 98.7 F | OXYGEN SATURATION: 97 % | SYSTOLIC BLOOD PRESSURE: 130 MMHG | WEIGHT: 247 LBS | DIASTOLIC BLOOD PRESSURE: 80 MMHG | HEIGHT: 70 IN | HEART RATE: 70 BPM | BODY MASS INDEX: 35.36 KG/M2

## 2022-02-08 DIAGNOSIS — N18.6 CHRONIC KIDNEY DISEASE WITH END STAGE RENAL FAILURE ON DIALYSIS (HCC): ICD-10-CM

## 2022-02-08 DIAGNOSIS — I50.22 CHRONIC SYSTOLIC CHF (CONGESTIVE HEART FAILURE) (HCC): Primary | ICD-10-CM

## 2022-02-08 DIAGNOSIS — N18.6 ESRD (END STAGE RENAL DISEASE) (HCC): ICD-10-CM

## 2022-02-08 DIAGNOSIS — E11.51 DIABETES MELLITUS WITH PERIPHERAL CIRCULATORY DISORDER (HCC): ICD-10-CM

## 2022-02-08 DIAGNOSIS — Z99.2 CHRONIC KIDNEY DISEASE WITH END STAGE RENAL FAILURE ON DIALYSIS (HCC): ICD-10-CM

## 2022-02-08 DIAGNOSIS — E11.621 TYPE 2 DIABETES MELLITUS WITH FOOT ULCER, WITHOUT LONG-TERM CURRENT USE OF INSULIN (HCC): ICD-10-CM

## 2022-02-08 DIAGNOSIS — N25.81 SECONDARY HYPERPARATHYROIDISM OF RENAL ORIGIN (HCC): ICD-10-CM

## 2022-02-08 DIAGNOSIS — L97.509 TYPE 2 DIABETES MELLITUS WITH FOOT ULCER, WITHOUT LONG-TERM CURRENT USE OF INSULIN (HCC): ICD-10-CM

## 2022-02-08 DIAGNOSIS — I42.0 CARDIOMYOPATHY, DILATED (HCC): ICD-10-CM

## 2022-02-08 DIAGNOSIS — E66.01 SEVERE OBESITY (BMI 35.0-39.9) WITH COMORBIDITY (HCC): ICD-10-CM

## 2022-02-08 DIAGNOSIS — I13.10 HYPERTENSIVE HEART AND CHRONIC KIDNEY DISEASE WITHOUT HEART FAILURE, WITH STAGE 1 THROUGH STAGE 4 CHRONIC KIDNEY DISEASE, OR UNSPECIFIED CHRONIC KIDNEY DISEASE: ICD-10-CM

## 2022-02-08 LAB — SL AMB POCT HEMOGLOBIN AIC: 5.4 (ref ?–6.5)

## 2022-02-08 PROCEDURE — 99214 OFFICE O/P EST MOD 30 MIN: CPT | Performed by: FAMILY MEDICINE

## 2022-02-08 PROCEDURE — 83036 HEMOGLOBIN GLYCOSYLATED A1C: CPT | Performed by: FAMILY MEDICINE

## 2022-02-08 RX ORDER — NITROGLYCERIN 0.4 MG/1
0.4 TABLET SUBLINGUAL
COMMUNITY
Start: 2021-12-08 | End: 2022-05-18 | Stop reason: ALTCHOICE

## 2022-02-08 RX ORDER — CARVEDILOL 6.25 MG/1
6.25 TABLET ORAL 2 TIMES DAILY WITH MEALS
COMMUNITY
Start: 2021-12-16 | End: 2022-03-10 | Stop reason: DRUGHIGH

## 2022-02-08 NOTE — PROGRESS NOTES
BMI Counseling: Body mass index is 35 44 kg/m²  The BMI is above normal  Nutrition recommendations include decreasing portion sizes, decreasing fast food intake and limiting drinks that contain sugar  Exercise recommendations include exercising 3-5 times per week  No pharmacotherapy was ordered  Rationale for BMI follow-up plan is due to patient being overweight or obese  Subjective:   Chief Complaint   Patient presents with    Follow-up     chronic conditions        Patient ID: Rhonda Hope  is a 61 y o  male  The patient with the history of hypertension non-insulin-dependent diabetic and congestive heart failure a complication of diabetic including kidney disease peripheral vascular disease here follow-up with a chronic condition medication has been review with the patient patient follow-up with the Cardiology and the per patient he has been taking his medication recent blood work review with the patient      The following portions of the patient's history were reviewed and updated as appropriate: allergies, current medications, past family history, past medical history, past social history, past surgical history and problem list     Review of Systems   Constitutional: Negative for fatigue and fever  HENT: Negative for congestion, ear pain, sinus pressure, sinus pain and sore throat  Eyes: Negative for pain, discharge, redness and itching  Respiratory: Negative for cough, chest tightness, shortness of breath and stridor  Cardiovascular: Negative for chest pain, palpitations and leg swelling  Gastrointestinal: Negative for abdominal pain, blood in stool, constipation, diarrhea and nausea  Genitourinary: Negative for dysuria, flank pain, frequency and hematuria  Musculoskeletal: Negative for back pain, joint swelling and neck pain  Skin: Negative for pallor and rash  Neurological: Negative for dizziness, tremors, weakness, numbness and headaches     Hematological: Does not bruise/bleed easily  Objective:  Vitals:    02/08/22 1112   BP: 130/80   Pulse: 70   Temp: 98 7 °F (37 1 °C)   TempSrc: Tympanic   SpO2: 97%   Weight: 112 kg (247 lb)   Height: 5' 10" (1 778 m)      Physical Exam  Vitals and nursing note reviewed  Constitutional:       General: He is not in acute distress  Appearance: Normal appearance  He is well-developed  He is not diaphoretic  HENT:      Head: Normocephalic  Right Ear: Tympanic membrane, ear canal and external ear normal       Left Ear: Tympanic membrane, ear canal and external ear normal       Nose: Nose normal  No congestion or rhinorrhea  Mouth/Throat:      Mouth: Mucous membranes are moist       Pharynx: Oropharynx is clear  No oropharyngeal exudate or posterior oropharyngeal erythema  Eyes:      General:         Right eye: No discharge  Left eye: No discharge  Conjunctiva/sclera: Conjunctivae normal    Neck:      Vascular: No JVD  Cardiovascular:      Rate and Rhythm: Normal rate  Heart sounds: Murmur heard  No gallop  Pulmonary:      Effort: Pulmonary effort is normal  No respiratory distress  Breath sounds: Normal breath sounds  No stridor  No wheezing or rales  Chest:      Chest wall: No tenderness  Abdominal:      General: There is no distension  Palpations: Abdomen is soft  There is no mass  Tenderness: There is no abdominal tenderness  There is no rebound  Musculoskeletal:      Cervical back: Normal range of motion and neck supple  Lymphadenopathy:      Cervical: No cervical adenopathy  Skin:     General: Skin is warm  Findings: No rash  Neurological:      Mental Status: He is alert and oriented to person, place, and time  Sensory: No sensory deficit        Gait: Gait normal    Psychiatric:         Mood and Affect: Mood normal          Behavior: Behavior normal            Assessment/Plan:    Diabetes mellitus with peripheral circulatory disorder (HCC)    Lab Results   Component Value Date    HGBA1C 5 4 02/08/2022     A chronic fair control with the low carb diet patient already on statin and aspirin a encouraged patient to lose weight    Diabetes with ulcer of foot ([facility] [address] )    Lab Results   Component Value Date    HGBA1C 5 4 02/08/2022   A chronic hemoglobin A1c will control patient does follow-up with the podiatric for diabetic foot exam proper foot care discussed the patient    Secondary hyperparathyroidism of renal origin [facility])  A patient with the history of the chronic kidney disease stage 5 on dialysis the he does follow-up with the Nephrology periodically    Hypertensive heart and chronic kidney disease without heart failure, with stage 1 through stage 4 chronic kidney disease, or unspecified chronic kidney disease  Lab Results   Component Value Date    EGFR 11 11/01/2021    EGFR 8 (L) 08/05/2021    EGFR 10 (L) 06/09/2020    CREATININE 6 13 (H) 11/01/2021    CREATININE 7 47 (H) 08/05/2021    CREATININE 6 59 (H) 06/09/2020   Chronic asymptomatic blood pressure at the goal for a patient with diabetic and chronic kidney disease currently on losartan 25 mg once a day metoprolol succinate 25 mg once a day and on core ache patient the recommended to follow-up with the low-salt diet important lose weight    Chronic systolic CHF (congestive heart failure) ([facility] [address] )  Wt Readings from Last 3 Encounters:   02/08/22 112 kg (247 lb)   11/09/21 112 kg (247 lb 9 6 oz)   11/02/21 112 kg (246 lb 14 6 oz)     A no sign of symptom of decompensation congestive heart failure patient no follow-up with the Cardiology he is currently on Coreg statin Arb and and the aspirin discussed the daily weight low-salt diet        Chronic kidney disease with end stage renal failure on dialysis [facility])  Lab Results   Component Value Date    EGFR 11 11/01/2021    EGFR 8 (L) 08/05/2021    EGFR 10 (L) 06/09/2020    CREATININE 6 13 (H) 11/01/2021    CREATININE 7 47 (H) 08/05/2021    CREATININE 6 59 (H) 06/09/2020   The patient end-stage kidney disease on dialysis 3 times a week follow-up with the Nephrology no lower extremity edema    Severe obesity (BMI 35 0-39  9) with comorbidity ([facility] )  The BMI is above average  BMI counseling and education was provided to the patient  Nutrition recommendations include reducing portion sizes, decreasing overall calorie intake, 3-5 servings of fruits/vegetables daily, reducing fast food intake, consuming healthier snacks, decreasing soda and/or juice intake, moderation in carbohydrate intake and reducing intake of saturated fat and trans fat  Exercise recommendations include moderate aerobic physical activity for 150 minutes/week, exercising 3-5 times per week and joining a gym  Diagnoses and all orders for this visit:    Chronic systolic CHF (congestive heart failure) (HCC)  -     CBC and differential; Future  -     Lipid Panel with Direct LDL reflex; Future  -     TSH, 3rd generation with Free T4 reflex; Future  -     Basic metabolic panel; Future    Hypertensive heart and chronic kidney disease without heart failure, with stage 1 through stage 4 chronic kidney disease, or unspecified chronic kidney disease  -     CBC and differential; Future  -     Lipid Panel with Direct LDL reflex; Future  -     TSH, 3rd generation with Free T4 reflex; Future  -     Basic metabolic panel; Future    Cardiomyopathy, dilated ([place] [address] )  -     CBC and differential; Future  -     Lipid Panel with Direct LDL reflex; Future  -     TSH, 3rd generation with Free T4 reflex; Future  -     Basic metabolic panel; Future    ESRD (end stage renal disease) ([facility] )  -     CBC and differential; Future  -     Lipid Panel with Direct LDL reflex; Future  -     TSH, 3rd generation with Free T4 reflex; Future  -     Basic metabolic panel; Future    Chronic kidney disease with end stage renal failure on dialysis (HCC)  -     CBC and differential; Future  -     Lipid Panel with Direct LDL reflex;  Future  -     TSH, 3rd generation with Free T4 reflex; Future  -     Basic metabolic panel; Future    Diabetes mellitus with peripheral circulatory disorder (HCC)  -     POCT hemoglobin A1c    Type 2 diabetes mellitus with foot ulcer, without long-term current use of insulin (HCC)    Secondary hyperparathyroidism of renal origin (HCC)    Severe obesity (BMI 35 0-39  9) with comorbidity ([address] )    Other orders  -     carvedilol (COREG) 6 25 mg tablet;  Take 6 25 mg by mouth 2 (two) times a day with meals  -     nitroglycerin (NITROSTAT) 0 4 mg SL tablet; Place 0 4 mg under the tongue

## 2022-02-10 PROBLEM — E66.01 SEVERE OBESITY (BMI 35.0-39.9) WITH COMORBIDITY (HCC): Status: ACTIVE | Noted: 2020-06-11

## 2022-02-10 PROBLEM — S98.132A AMPUTATION OF TOE OF LEFT FOOT (HCC): Status: ACTIVE | Noted: 2022-01-20

## 2022-02-10 NOTE — ASSESSMENT & PLAN NOTE
Lab Results   Component Value Date    EGFR 11 11/01/2021    EGFR 8 (L) 08/05/2021    EGFR 10 (L) 06/09/2020    CREATININE 6 13 (H) 11/01/2021    CREATININE 7 47 (H) 08/05/2021    CREATININE 6 59 (H) 06/09/2020   Chronic asymptomatic blood pressure at the goal for a patient with diabetic and chronic kidney disease currently on losartan 25 mg once a day metoprolol succinate 25 mg once a day and on core ache patient the recommended to follow-up with the low-salt diet important lose weight

## 2022-02-10 NOTE — ASSESSMENT & PLAN NOTE
Lab Results   Component Value Date    HGBA1C 5 4 02/08/2022   A chronic hemoglobin A1c will control patient does follow-up with the podiatric for diabetic foot exam proper foot care discussed the patient

## 2022-02-10 NOTE — ASSESSMENT & PLAN NOTE
A patient with the history of the chronic kidney disease stage 5 on dialysis the he does follow-up with the Nephrology periodically

## 2022-02-10 NOTE — ASSESSMENT & PLAN NOTE
Lab Results   Component Value Date    EGFR 11 11/01/2021    EGFR 8 (L) 08/05/2021    EGFR 10 (L) 06/09/2020    CREATININE 6 13 (H) 11/01/2021    CREATININE 7 47 (H) 08/05/2021    CREATININE 6 59 (H) 06/09/2020   The patient end-stage kidney disease on dialysis 3 times a week follow-up with the Nephrology no lower extremity edema

## 2022-02-10 NOTE — ASSESSMENT & PLAN NOTE
Lab Results   Component Value Date    HGBA1C 5 4 02/08/2022     A chronic fair control with the low carb diet patient already on statin and aspirin a encouraged patient to lose weight

## 2022-02-10 NOTE — ASSESSMENT & PLAN NOTE
Wt Readings from Last 3 Encounters:   02/08/22 112 kg (247 lb)   11/09/21 112 kg (247 lb 9 6 oz)   11/02/21 112 kg (246 lb 14 6 oz)     A no sign of symptom of decompensation congestive heart failure patient no follow-up with the Cardiology he is currently on Coreg statin Arb and and the aspirin discussed the daily weight low-salt diet

## 2022-02-12 ENCOUNTER — APPOINTMENT (OUTPATIENT)
Dept: LAB | Facility: HOSPITAL | Age: 57
End: 2022-02-12
Payer: COMMERCIAL

## 2022-03-01 ENCOUNTER — TRANSITIONAL CARE MANAGEMENT (OUTPATIENT)
Dept: FAMILY MEDICINE CLINIC | Facility: CLINIC | Age: 57
End: 2022-03-01

## 2022-03-08 ENCOUNTER — OFFICE VISIT (OUTPATIENT)
Dept: FAMILY MEDICINE CLINIC | Facility: CLINIC | Age: 57
End: 2022-03-08
Payer: COMMERCIAL

## 2022-03-08 VITALS
DIASTOLIC BLOOD PRESSURE: 74 MMHG | SYSTOLIC BLOOD PRESSURE: 112 MMHG | OXYGEN SATURATION: 97 % | HEIGHT: 70 IN | BODY MASS INDEX: 33.64 KG/M2 | HEART RATE: 71 BPM | WEIGHT: 235 LBS | RESPIRATION RATE: 16 BRPM | TEMPERATURE: 97.9 F

## 2022-03-08 DIAGNOSIS — E11.621 TYPE 2 DIABETES MELLITUS WITH FOOT ULCER, WITHOUT LONG-TERM CURRENT USE OF INSULIN (HCC): ICD-10-CM

## 2022-03-08 DIAGNOSIS — J12.3 PNEUMONIA DUE TO HUMAN METAPNEUMOVIRUS (HMPV): Primary | ICD-10-CM

## 2022-03-08 DIAGNOSIS — L97.509 TYPE 2 DIABETES MELLITUS WITH FOOT ULCER, WITHOUT LONG-TERM CURRENT USE OF INSULIN (HCC): ICD-10-CM

## 2022-03-08 DIAGNOSIS — E78.5 DYSLIPIDEMIA, GOAL LDL BELOW 70: ICD-10-CM

## 2022-03-08 DIAGNOSIS — I10 PRIMARY HYPERTENSION: ICD-10-CM

## 2022-03-08 PROCEDURE — 99496 TRANSJ CARE MGMT HIGH F2F 7D: CPT | Performed by: FAMILY MEDICINE

## 2022-03-08 RX ORDER — ALBUTEROL SULFATE 90 UG/1
2 AEROSOL, METERED RESPIRATORY (INHALATION) EVERY 6 HOURS PRN
COMMUNITY
Start: 2022-02-27

## 2022-03-08 NOTE — PROGRESS NOTES
Assessment/Plan:     Pneumonia due to human metapneumovirus (hMPV)  Status post hospitalization from February 23rd bad worry 27 a patient found to have viral pneumonia discharged not on any antibiotic patient symptomatic improved lungs clear today his oxygen level on room air 97%  A discussed vacation if become symptomatic call the office recommend to repeat chest x-ray in 6 week to confirm resolution    Diabetes with ulcer of foot ([facility] [address] )    Lab Results   Component Value Date    HGBA1C 5 4 02/08/2022   Patient hemoglobin A1c will control not diet the patient continued to follow-up with the podiatric periodically proper shoes wear and proper foot care discussed with the patient    Hypertension  Chronic asymptomatic fair control patient will continue with the losartan 25 mg once a day and he is on carvedilol  3 125 mg po BID  A low-salt diet important lose weight discussed the patient    Dyslipidemia, goal LDL below 70  Chronic asymptomatic LDL therapeutic in diabetic patient continue with atorvastatin 20 mg once a day       Diagnoses and all orders for this visit:    Pneumonia due to human metapneumovirus (hMPV)  -     CBC and differential; Future  -     Basic metabolic panel; Future  -     Lipid panel; Future  -     Hemoglobin A1C; Future  -     XR chest pa & lateral; Future    Type 2 diabetes mellitus with foot ulcer, without long-term current use of insulin (HCC)  -     CBC and differential; Future  -     Basic metabolic panel; Future  -     Lipid panel; Future  -     Hemoglobin A1C; Future    Dyslipidemia, goal LDL below 70  -     CBC and differential; Future  -     Basic metabolic panel; Future  -     Lipid panel; Future  -     Hemoglobin A1C; Future    Primary hypertension  -     CBC and differential; Future  -     Basic metabolic panel; Future  -     Lipid panel; Future  -     Hemoglobin A1C; Future  -     carvedilol (Coreg) 3 125 mg tablet;  Take 1 tablet (3 125 mg total) by mouth 2 (two) times a day with meals    Other orders  -     albuterol (PROVENTIL HFA,VENTOLIN HFA) 90 mcg/act inhaler; Inhale 2 puffs every 6 (six) hours as needed         Subjective:     Patient ID: Joseph Gutierrez  is a 61 y o  male  Patient here status post hospitalization patient who known to have history of a chronic kidney disease stage 3 on dialysis and he had fever 1 or 3 4 during his dialysis visit patient been feeling fatigue and weakness and chills a patient the a had the admitted to the hospital on 02/23/22and had the workup found to have a pneumonia secondary to viral infection hMPV the he was started in urgently on antibiotic IV but the blood culture was negative the antibiotic has been discontinue a secondary to have a viral pneumonia patient also during hospitalization had elevated troponin and cardiology has been consult patient found to have non STEMI type II Cardiology recommend to stop heparin and Plavix and the also during hospitalization he did the evaluated by Nephrology had dialysis patient was stable to discharge on the February 27  Hospital record including chest x-ray EKG blood work culture has been review the on the discharge patient continue on aspirin atorvastatin Coreg and losartan      Review of Systems   Constitutional: Positive for fatigue  Negative for activity change, appetite change and fever  HENT: Negative for congestion, ear pain, sinus pressure, sinus pain and sore throat  Eyes: Negative for pain, discharge, redness and itching  Respiratory: Negative for cough, chest tightness, shortness of breath and stridor  Cardiovascular: Negative for chest pain, palpitations and leg swelling  Gastrointestinal: Negative for abdominal pain, blood in stool, constipation, diarrhea and nausea  Genitourinary: Negative for dysuria, flank pain, frequency and hematuria  Musculoskeletal: Negative for back pain, joint swelling and neck pain  Skin: Negative for pallor and rash     Neurological: Negative for dizziness, tremors, weakness, numbness and headaches  Hematological: Does not bruise/bleed easily  Objective:     Physical Exam  Constitutional:       General: He is not in acute distress  Appearance: He is well-developed  He is not diaphoretic  HENT:      Head: Normocephalic and atraumatic  Nose: Nose normal    Eyes:      General: No scleral icterus  Right eye: No discharge  Left eye: No discharge  Conjunctiva/sclera: Conjunctivae normal    Neck:      Vascular: No JVD  Comments: No visible masses  Pulmonary:      Effort: Pulmonary effort is normal  No respiratory distress  Breath sounds: Normal breath sounds  No stridor  No wheezing or rales  Abdominal:      General: There is no distension  Palpations: Abdomen is soft  Comments: Per patient abdomen is soft nontender and also abdomen not distended   Patient deny any visible or palpable bulging so just hernia  I was able to visualize the abdomen no erythema no distension no bulging so just mass or hernia   Musculoskeletal:         General: Normal range of motion  Cervical back: Normal range of motion and neck supple  Lymphadenopathy:      Cervical: No cervical adenopathy  Skin:     Coloration: Skin is not pale  Findings: No rash  Neurological:      Mental Status: He is alert and oriented to person, place, and time  Psychiatric:         Behavior: Behavior normal            Vitals:    03/08/22 1028   BP: 112/74   BP Location: Left arm   Patient Position: Sitting   Cuff Size: Large   Pulse: 71   Resp: 16   Temp: 97 9 °F (36 6 °C)   TempSrc: Tympanic   SpO2: 97%   Weight: 107 kg (235 lb)   Height: 5' 10" (1 778 m)       Transitional Care Management Review:  Sirena Ocasio  is a 61 y o  male here for TCM follow up       During the TCM phone call patient stated:    TCM Call (since 2/7/2022)     Date and time call was made  3/1/2022 10:46 AM    Hospital care reviewed  Records reviewed Patient was hospitialized at  [facility]        Date of Admission  02/23/22    Date of discharge  02/27/22    Diagnosis  pneumonia     Disposition  Home    Were the patients medications reviewed and updated  No    Current Symptoms  None      TCM Call (since 2/7/2022)     Post hospital issues  None    Should patient be enrolled in anticoag monitoring? No    Scheduled for follow up?   Yes    Did you obtain your prescribed medications  Yes    Do you need help managing your prescriptions or medications  No    Is transportation to your appointment needed  No    I have advised the patient to call PCP with any new or worsening symptoms  sally ji     Living Arrangements  Alone    Support System  None    Are you recieving any outpatient services  No    Are you recieving home care services  No    Are you using any community resources  No    Current waiver services  No    Have you fallen in the last 12 months  No    Interperter language line needed  No    Counseling  Patient    Counseling topics  Diagnostic results          Joaquim Jameson MD

## 2022-03-09 ENCOUNTER — TELEPHONE (OUTPATIENT)
Dept: ADMINISTRATIVE | Facility: OTHER | Age: 57
End: 2022-03-09

## 2022-03-09 NOTE — TELEPHONE ENCOUNTER
----- Message from Cristy Salinas sent at 3/9/2022  8:07 AM EST -----  Regarding: Modesto Evans request  03/09/22 8:07 AM    Hello, our patient Trudy Collet  has had Diabetic Foot Exam completed/performed  Please assist in updating the patient chart by pulling the document from the Media Tab  The date of service is 3/8/2022       Thank you,  [id number] [place] [id number]

## 2022-03-09 NOTE — TELEPHONE ENCOUNTER
Upon review of the In Basket request we were able to locate, review, and update the patient chart as requested for Diabetic Foot Exam     Any additional questions or concerns should be emailed to the Practice Liaisons via [email or web address] email, please do not reply via In Basket      Thank you  Stewart Ivy

## 2022-03-10 ENCOUNTER — TELEPHONE (OUTPATIENT)
Dept: FAMILY MEDICINE CLINIC | Facility: CLINIC | Age: 57
End: 2022-03-10

## 2022-03-10 RX ORDER — CARVEDILOL 3.12 MG/1
3.12 TABLET ORAL 2 TIMES DAILY WITH MEALS
Qty: 180 TABLET | Refills: 0
Start: 2022-03-10 | End: 2022-05-24 | Stop reason: ALTCHOICE

## 2022-03-10 NOTE — ASSESSMENT & PLAN NOTE
Lab Results   Component Value Date    HGBA1C 5 4 02/08/2022   Patient hemoglobin A1c will control not diet the patient continued to follow-up with the podiatric periodically proper shoes wear and proper foot care discussed with the patient

## 2022-03-10 NOTE — TELEPHONE ENCOUNTER
----- Message from Jocelyn Garcia MD sent at 3/10/2022 10:12 AM EST -----  I order CXR f/up after Pneumonia ,to let patient know need to be done 4 w to confirm resolution

## 2022-03-10 NOTE — ASSESSMENT & PLAN NOTE
Chronic asymptomatic fair control patient will continue with the losartan 25 mg once a day and he is on carvedilol  3 125 mg po BID  A low-salt diet important lose weight discussed the patient

## 2022-03-10 NOTE — ASSESSMENT & PLAN NOTE
Chronic asymptomatic LDL therapeutic in diabetic patient continue with atorvastatin 20 mg once a day

## 2022-03-10 NOTE — ASSESSMENT & PLAN NOTE
Status post hospitalization from February 23rd bad worry 27 a patient found to have viral pneumonia discharged not on any antibiotic patient symptomatic improved lungs clear today his oxygen level on room air 97%  A discussed vacation if become symptomatic call the office recommend to repeat chest x-ray in 6 week to confirm resolution

## 2022-04-22 NOTE — ASSESSMENT & PLAN NOTE
Notified of Lt breast US results. Repeat US in 6 months. Orders sent to Central Scheduling.      Patient has been evaluated by other provider for his depression and recommend murmur 1 to treated depression patient tolerated well

## 2022-05-18 ENCOUNTER — HOSPITAL ENCOUNTER (INPATIENT)
Facility: HOSPITAL | Age: 57
LOS: 2 days | Discharge: HOME | DRG: 137 | End: 2022-05-20
Attending: EMERGENCY MEDICINE
Payer: COMMERCIAL

## 2022-05-18 ENCOUNTER — APPOINTMENT (INPATIENT)
Dept: DIALYSIS | Facility: HOSPITAL | Age: 57
DRG: 137 | End: 2022-05-18
Payer: COMMERCIAL

## 2022-05-18 ENCOUNTER — APPOINTMENT (INPATIENT)
Dept: NON INVASIVE DIAGNOSTICS | Facility: HOSPITAL | Age: 57
DRG: 137 | End: 2022-05-18
Payer: COMMERCIAL

## 2022-05-18 ENCOUNTER — APPOINTMENT (EMERGENCY)
Dept: RADIOLOGY | Facility: HOSPITAL | Age: 57
DRG: 137 | End: 2022-05-18
Payer: COMMERCIAL

## 2022-05-18 DIAGNOSIS — U07.1 COVID-19: ICD-10-CM

## 2022-05-18 DIAGNOSIS — I50.9 CHF EXACERBATION (HCC): Primary | ICD-10-CM

## 2022-05-18 DIAGNOSIS — R77.8 ELEVATED TROPONIN: ICD-10-CM

## 2022-05-18 DIAGNOSIS — N18.6 ESRD (END STAGE RENAL DISEASE) (HCC): ICD-10-CM

## 2022-05-18 PROBLEM — M79.661 PAIN AND SWELLING OF RIGHT LOWER LEG: Status: ACTIVE | Noted: 2022-05-18

## 2022-05-18 PROBLEM — M79.89 PAIN AND SWELLING OF RIGHT LOWER LEG: Status: ACTIVE | Noted: 2022-05-18

## 2022-05-18 PROBLEM — F32.A DEPRESSION: Status: ACTIVE | Noted: 2022-05-18

## 2022-05-18 PROBLEM — J12.82 PNEUMONIA DUE TO COVID-19 VIRUS: Status: ACTIVE | Noted: 2022-02-24

## 2022-05-18 LAB
2HR DELTA HS TROPONIN: -123 NG/L
ALBUMIN SERPL BCP-MCNC: 3.8 G/DL (ref 3.5–5)
ALP SERPL-CCNC: 41 U/L (ref 46–116)
ALT SERPL W P-5'-P-CCNC: 21 U/L (ref 12–78)
ANION GAP SERPL CALCULATED.3IONS-SCNC: 15 MMOL/L (ref 4–13)
APTT PPP: 31 SECONDS (ref 23–37)
AST SERPL W P-5'-P-CCNC: 26 U/L (ref 5–45)
ATRIAL RATE: 64 BPM
ATRIAL RATE: 68 BPM
ATRIAL RATE: 70 BPM
ATRIAL RATE: 71 BPM
BASOPHILS # BLD AUTO: 0.03 THOUSANDS/ΜL (ref 0–0.1)
BASOPHILS NFR BLD AUTO: 0 % (ref 0–1)
BILIRUB SERPL-MCNC: 0.53 MG/DL (ref 0.2–1)
BUN SERPL-MCNC: 67 MG/DL (ref 5–25)
CALCIUM SERPL-MCNC: 9.2 MG/DL (ref 8.3–10.1)
CARDIAC TROPONIN I PNL SERPL HS: 294 NG/L
CARDIAC TROPONIN I PNL SERPL HS: 417 NG/L
CHLORIDE SERPL-SCNC: 103 MMOL/L (ref 100–108)
CO2 SERPL-SCNC: 18 MMOL/L (ref 21–32)
CREAT SERPL-MCNC: 14.53 MG/DL (ref 0.6–1.3)
EOSINOPHIL # BLD AUTO: 0.01 THOUSAND/ΜL (ref 0–0.61)
EOSINOPHIL NFR BLD AUTO: 0 % (ref 0–6)
ERYTHROCYTE [DISTWIDTH] IN BLOOD BY AUTOMATED COUNT: 12.9 % (ref 11.6–15.1)
GFR SERPL CREATININE-BSD FRML MDRD: 3 ML/MIN/1.73SQ M
GLUCOSE SERPL-MCNC: 107 MG/DL (ref 65–140)
HCT VFR BLD AUTO: 35.8 % (ref 36.5–49.3)
HGB BLD-MCNC: 12.1 G/DL (ref 12–17)
IMM GRANULOCYTES # BLD AUTO: 0.03 THOUSAND/UL (ref 0–0.2)
IMM GRANULOCYTES NFR BLD AUTO: 0 % (ref 0–2)
LACTATE SERPL-SCNC: 1.2 MMOL/L (ref 0.5–2)
LYMPHOCYTES # BLD AUTO: 2.31 THOUSANDS/ΜL (ref 0.6–4.47)
LYMPHOCYTES NFR BLD AUTO: 30 % (ref 14–44)
MCH RBC QN AUTO: 36.3 PG (ref 26.8–34.3)
MCHC RBC AUTO-ENTMCNC: 33.8 G/DL (ref 31.4–37.4)
MCV RBC AUTO: 108 FL (ref 82–98)
MONOCYTES # BLD AUTO: 1.47 THOUSAND/ΜL (ref 0.17–1.22)
MONOCYTES NFR BLD AUTO: 19 % (ref 4–12)
NEUTROPHILS # BLD AUTO: 3.86 THOUSANDS/ΜL (ref 1.85–7.62)
NEUTS SEG NFR BLD AUTO: 51 % (ref 43–75)
NRBC BLD AUTO-RTO: 0 /100 WBCS
NT-PROBNP SERPL-MCNC: ABNORMAL PG/ML
P AXIS: 48 DEGREES
P AXIS: 48 DEGREES
P AXIS: 55 DEGREES
P AXIS: 69 DEGREES
PLATELET # BLD AUTO: 163 THOUSANDS/UL (ref 149–390)
PMV BLD AUTO: 10.7 FL (ref 8.9–12.7)
POTASSIUM SERPL-SCNC: 5.2 MMOL/L (ref 3.5–5.3)
PR INTERVAL: 196 MS
PR INTERVAL: 196 MS
PR INTERVAL: 200 MS
PR INTERVAL: 204 MS
PROT SERPL-MCNC: 8.8 G/DL (ref 6.4–8.2)
QRS AXIS: 72 DEGREES
QRS AXIS: 74 DEGREES
QRS AXIS: 75 DEGREES
QRS AXIS: 76 DEGREES
QRSD INTERVAL: 102 MS
QRSD INTERVAL: 102 MS
QRSD INTERVAL: 104 MS
QRSD INTERVAL: 108 MS
QT INTERVAL: 382 MS
QT INTERVAL: 382 MS
QT INTERVAL: 386 MS
QT INTERVAL: 398 MS
QTC INTERVAL: 410 MS
QTC INTERVAL: 410 MS
QTC INTERVAL: 412 MS
QTC INTERVAL: 415 MS
RBC # BLD AUTO: 3.33 MILLION/UL (ref 3.88–5.62)
SODIUM SERPL-SCNC: 136 MMOL/L (ref 136–145)
T WAVE AXIS: -63 DEGREES
T WAVE AXIS: -78 DEGREES
T WAVE AXIS: -87 DEGREES
T WAVE AXIS: 235 DEGREES
VENTRICULAR RATE: 64 BPM
VENTRICULAR RATE: 68 BPM
VENTRICULAR RATE: 70 BPM
VENTRICULAR RATE: 71 BPM
WBC # BLD AUTO: 7.71 THOUSAND/UL (ref 4.31–10.16)

## 2022-05-18 PROCEDURE — 99223 1ST HOSP IP/OBS HIGH 75: CPT | Performed by: INTERNAL MEDICINE

## 2022-05-18 PROCEDURE — 83880 ASSAY OF NATRIURETIC PEPTIDE: CPT

## 2022-05-18 PROCEDURE — 83605 ASSAY OF LACTIC ACID: CPT | Performed by: NURSE PRACTITIONER

## 2022-05-18 PROCEDURE — 84484 ASSAY OF TROPONIN QUANT: CPT

## 2022-05-18 PROCEDURE — 85025 COMPLETE CBC W/AUTO DIFF WBC: CPT

## 2022-05-18 PROCEDURE — 99254 IP/OBS CNSLTJ NEW/EST MOD 60: CPT | Performed by: INTERNAL MEDICINE

## 2022-05-18 PROCEDURE — 80053 COMPREHEN METABOLIC PANEL: CPT

## 2022-05-18 PROCEDURE — 93971 EXTREMITY STUDY: CPT | Performed by: SURGERY

## 2022-05-18 PROCEDURE — XW033E5 INTRODUCTION OF REMDESIVIR ANTI-INFECTIVE INTO PERIPHERAL VEIN, PERCUTANEOUS APPROACH, NEW TECHNOLOGY GROUP 5: ICD-10-PCS | Performed by: INTERNAL MEDICINE

## 2022-05-18 PROCEDURE — 93010 ELECTROCARDIOGRAM REPORT: CPT | Performed by: INTERNAL MEDICINE

## 2022-05-18 PROCEDURE — 99285 EMERGENCY DEPT VISIT HI MDM: CPT

## 2022-05-18 PROCEDURE — 36415 COLL VENOUS BLD VENIPUNCTURE: CPT

## 2022-05-18 PROCEDURE — 93971 EXTREMITY STUDY: CPT

## 2022-05-18 PROCEDURE — 85730 THROMBOPLASTIN TIME PARTIAL: CPT | Performed by: PHYSICIAN ASSISTANT

## 2022-05-18 PROCEDURE — 71045 X-RAY EXAM CHEST 1 VIEW: CPT

## 2022-05-18 PROCEDURE — 93005 ELECTROCARDIOGRAM TRACING: CPT

## 2022-05-18 RX ORDER — ASPIRIN 325 MG
325 TABLET ORAL ONCE
Status: COMPLETED | OUTPATIENT
Start: 2022-05-18 | End: 2022-05-18

## 2022-05-18 RX ORDER — HEPARIN SODIUM 5000 [USP'U]/ML
5000 INJECTION, SOLUTION INTRAVENOUS; SUBCUTANEOUS EVERY 8 HOURS SCHEDULED
Status: DISCONTINUED | OUTPATIENT
Start: 2022-05-18 | End: 2022-05-20 | Stop reason: HOSPADM

## 2022-05-18 RX ORDER — ASPIRIN 81 MG/1
81 TABLET, CHEWABLE ORAL DAILY
Status: DISCONTINUED | OUTPATIENT
Start: 2022-05-18 | End: 2022-05-20 | Stop reason: HOSPADM

## 2022-05-18 RX ORDER — MAGNESIUM HYDROXIDE/ALUMINUM HYDROXICE/SIMETHICONE 120; 1200; 1200 MG/30ML; MG/30ML; MG/30ML
30 SUSPENSION ORAL EVERY 6 HOURS PRN
Status: DISCONTINUED | OUTPATIENT
Start: 2022-05-18 | End: 2022-05-20 | Stop reason: HOSPADM

## 2022-05-18 RX ORDER — LIDOCAINE HYDROCHLORIDE 40 MG/ML
5 SOLUTION TOPICAL DAILY PRN
Status: DISCONTINUED | OUTPATIENT
Start: 2022-05-18 | End: 2022-05-20 | Stop reason: HOSPADM

## 2022-05-18 RX ORDER — HEPARIN SODIUM 5000 [USP'U]/ML
5000 INJECTION, SOLUTION INTRAVENOUS; SUBCUTANEOUS EVERY 8 HOURS SCHEDULED
Status: DISCONTINUED | OUTPATIENT
Start: 2022-05-18 | End: 2022-05-18

## 2022-05-18 RX ORDER — HEPARIN SODIUM 1000 [USP'U]/ML
4200 INJECTION, SOLUTION INTRAVENOUS; SUBCUTANEOUS
Status: DISCONTINUED | OUTPATIENT
Start: 2022-05-18 | End: 2022-05-18

## 2022-05-18 RX ORDER — CARVEDILOL 3.12 MG/1
3.12 TABLET ORAL 2 TIMES DAILY WITH MEALS
Status: DISCONTINUED | OUTPATIENT
Start: 2022-05-18 | End: 2022-05-19

## 2022-05-18 RX ORDER — PANTOPRAZOLE SODIUM 20 MG/1
20 TABLET, DELAYED RELEASE ORAL ONCE
Status: COMPLETED | OUTPATIENT
Start: 2022-05-18 | End: 2022-05-18

## 2022-05-18 RX ORDER — ECHINACEA PURPUREA EXTRACT 125 MG
1 TABLET ORAL
Status: DISCONTINUED | OUTPATIENT
Start: 2022-05-18 | End: 2022-05-20 | Stop reason: HOSPADM

## 2022-05-18 RX ORDER — HEPARIN SODIUM 10000 [USP'U]/100ML
3-30 INJECTION, SOLUTION INTRAVENOUS
Status: DISCONTINUED | OUTPATIENT
Start: 2022-05-18 | End: 2022-05-18

## 2022-05-18 RX ORDER — ATORVASTATIN CALCIUM 20 MG/1
20 TABLET, FILM COATED ORAL
Status: DISCONTINUED | OUTPATIENT
Start: 2022-05-18 | End: 2022-05-20 | Stop reason: HOSPADM

## 2022-05-18 RX ORDER — LIDOCAINE HYDROCHLORIDE 40 MG/ML
5 SOLUTION TOPICAL DAILY PRN
Status: DISCONTINUED | OUTPATIENT
Start: 2022-05-18 | End: 2022-05-18

## 2022-05-18 RX ORDER — GUAIFENESIN/DEXTROMETHORPHAN 100-10MG/5
10 SYRUP ORAL EVERY 4 HOURS PRN
Status: DISCONTINUED | OUTPATIENT
Start: 2022-05-18 | End: 2022-05-20 | Stop reason: HOSPADM

## 2022-05-18 RX ORDER — HEPARIN SODIUM 1000 [USP'U]/ML
8400 INJECTION, SOLUTION INTRAVENOUS; SUBCUTANEOUS ONCE
Status: COMPLETED | OUTPATIENT
Start: 2022-05-18 | End: 2022-05-18

## 2022-05-18 RX ORDER — ONDANSETRON 2 MG/ML
4 INJECTION INTRAMUSCULAR; INTRAVENOUS EVERY 6 HOURS PRN
Status: DISCONTINUED | OUTPATIENT
Start: 2022-05-18 | End: 2022-05-20 | Stop reason: HOSPADM

## 2022-05-18 RX ORDER — GUAIFENESIN 600 MG
600 TABLET, EXTENDED RELEASE 12 HR ORAL EVERY 12 HOURS SCHEDULED
Status: DISCONTINUED | OUTPATIENT
Start: 2022-05-18 | End: 2022-05-20 | Stop reason: HOSPADM

## 2022-05-18 RX ORDER — HEPARIN SODIUM 1000 [USP'U]/ML
8400 INJECTION, SOLUTION INTRAVENOUS; SUBCUTANEOUS
Status: DISCONTINUED | OUTPATIENT
Start: 2022-05-18 | End: 2022-05-18

## 2022-05-18 RX ORDER — ALBUTEROL SULFATE 90 UG/1
2 AEROSOL, METERED RESPIRATORY (INHALATION) EVERY 6 HOURS PRN
Status: DISCONTINUED | OUTPATIENT
Start: 2022-05-18 | End: 2022-05-20 | Stop reason: HOSPADM

## 2022-05-18 RX ORDER — ACETAMINOPHEN 325 MG/1
650 TABLET ORAL EVERY 6 HOURS PRN
Status: DISCONTINUED | OUTPATIENT
Start: 2022-05-18 | End: 2022-05-20 | Stop reason: HOSPADM

## 2022-05-18 RX ORDER — LOSARTAN POTASSIUM 25 MG/1
12.5 TABLET ORAL
Status: DISCONTINUED | OUTPATIENT
Start: 2022-05-18 | End: 2022-05-19

## 2022-05-18 RX ORDER — CHLORPROMAZINE HYDROCHLORIDE 25 MG/1
25 TABLET, FILM COATED ORAL EVERY 6 HOURS PRN
Status: DISCONTINUED | OUTPATIENT
Start: 2022-05-18 | End: 2022-05-20 | Stop reason: HOSPADM

## 2022-05-18 RX ORDER — DOXERCALCIFEROL 2 UG/ML
3 INJECTION, SOLUTION INTRAVENOUS 3 TIMES WEEKLY
Status: DISCONTINUED | OUTPATIENT
Start: 2022-05-18 | End: 2022-05-20 | Stop reason: HOSPADM

## 2022-05-18 RX ADMIN — GUAIFENESIN AND DEXTROMETHORPHAN 10 ML: 100; 10 SYRUP ORAL at 07:50

## 2022-05-18 RX ADMIN — HEPARIN SODIUM 5000 UNITS: 5000 INJECTION INTRAVENOUS; SUBCUTANEOUS at 22:28

## 2022-05-18 RX ADMIN — LOSARTAN POTASSIUM 12.5 MG: 25 TABLET, FILM COATED ORAL at 21:00

## 2022-05-18 RX ADMIN — DOXERCALCIFEROL 3 MCG: 4 INJECTION, SOLUTION INTRAVENOUS at 16:09

## 2022-05-18 RX ADMIN — PANTOPRAZOLE SODIUM 20 MG: 20 TABLET, DELAYED RELEASE ORAL at 18:13

## 2022-05-18 RX ADMIN — GUAIFENESIN AND DEXTROMETHORPHAN 10 ML: 100; 10 SYRUP ORAL at 14:53

## 2022-05-18 RX ADMIN — ASPIRIN 325 MG ORAL TABLET 325 MG: 325 PILL ORAL at 03:51

## 2022-05-18 RX ADMIN — HEPARIN SODIUM 8400 UNITS: 1000 INJECTION INTRAVENOUS; SUBCUTANEOUS at 07:54

## 2022-05-18 RX ADMIN — GUAIFENESIN 600 MG: 600 TABLET, EXTENDED RELEASE ORAL at 21:26

## 2022-05-18 RX ADMIN — HEPARIN SODIUM 18 UNITS/KG/HR: 10000 INJECTION, SOLUTION INTRAVENOUS at 08:12

## 2022-05-18 RX ADMIN — REMDESIVIR 200 MG: 100 INJECTION, POWDER, LYOPHILIZED, FOR SOLUTION INTRAVENOUS at 14:35

## 2022-05-18 RX ADMIN — CARVEDILOL 3.12 MG: 3.12 TABLET, FILM COATED ORAL at 07:48

## 2022-05-18 RX ADMIN — CHLORPROMAZINE HYDROCHLORIDE 25 MG: 25 TABLET, SUGAR COATED ORAL at 22:29

## 2022-05-18 RX ADMIN — GUAIFENESIN 600 MG: 600 TABLET, EXTENDED RELEASE ORAL at 08:02

## 2022-05-18 RX ADMIN — ALBUTEROL SULFATE 2 PUFF: 90 AEROSOL, METERED RESPIRATORY (INHALATION) at 09:56

## 2022-05-18 RX ADMIN — ATORVASTATIN CALCIUM 20 MG: 20 TABLET, FILM COATED ORAL at 18:12

## 2022-05-18 RX ADMIN — GUAIFENESIN AND DEXTROMETHORPHAN 10 ML: 100; 10 SYRUP ORAL at 20:17

## 2022-05-18 RX ADMIN — ASPIRIN 81 MG CHEWABLE TABLET 81 MG: 81 TABLET CHEWABLE at 08:02

## 2022-05-18 RX ADMIN — CARVEDILOL 3.12 MG: 3.12 TABLET, FILM COATED ORAL at 18:06

## 2022-05-18 NOTE — ASSESSMENT & PLAN NOTE
· Continue dialysis per Nephrology  · Case Management will need to arrange outpatient dialysis schedule for patient given his COVID positive status

## 2022-05-18 NOTE — ASSESSMENT & PLAN NOTE
Wt Readings from Last 3 Encounters:   03/08/22 107 kg (235 lb)   02/08/22 112 kg (247 lb)   11/09/21 112 kg (247 lb 9 6 oz)     · Relatively euvolemic on physical exam, I suspect majority of his shortness of breath is coming from [place]  Defer diuretic therapy schedule to Nephrology who will most likely be doing HD inpatient today  This patient does not take diuretics at home    · His BNP is significantly elevated from baseline, this could be possibly from COVID infection  · Daily weights, I/O, 2 g Na diet  · Continue home GD MT

## 2022-05-18 NOTE — ASSESSMENT & PLAN NOTE
· Cardiac catheterization in February 2022 showed EF 60%  · CXR on admission today with worsening dilated cardiomyopathy, cephalization of vessels  Patient is experiencing some fluid overload symptoms  He is not taking his medications and he has acute viral illness and he was turned away from dialysis on Monday because of positive COVID  · Nephrology consultation for dialysis, I will defer diuretic schedule to them    · Echocardiogram ordered

## 2022-05-18 NOTE — ED PROVIDER NOTES
History  Chief Complaint   Patient presents with    Shortness of Breath     Pt reports sob for 3 days, productive cough  Dialysis on Mon, wed, Friday  Pt also c/o right leg pain  The patient is a 61year old male with history of MI, ICD, CHF with LVEF 30-35%, hypertension, diabetes, dialysis on Monday, Wednesday, Friday who presents to the emergency department for evaluation of a 3 day history of dyspnea  The patient reports that he was diagnosed with COVID 2 days ago  He has also had a productive cough, reports brown sputum  He states that on 05/13, he had an AV fistula placed  Since being discharged at that time, the patient has not taken any of his medications, nor has he been to dialysis  He also reports having had 4 episodes of nonbloody diarrhea today  Also reports, for the past several days, he has had right calf pain  Denies leg swelling  He otherwise denies fever, chills, nausea, vomiting, abdominal pain, history of DVT or PE, hemoptysis  Prior to Admission Medications   Prescriptions Last Dose Informant Patient Reported? Taking?    Adhesive Bandages MISC  Self No No   Sig: by Does not apply route 3 (three) times a day   Alcohol Swabs (ALCOHOL PREP) PADS  Self No No   Sig: by Does not apply route 3 (three) times a day   Blood Glucose Monitoring Suppl KIT  Self No No   Sig: by Does not apply route 3 (three) times a day ONE TOUCH DEVISE  TEST BLOOD SUGARS 3 TIMES DAILY   Lancets (ONETOUCH ULTRASOFT) lancets  Self No No   Sig: Test tid   albuterol (PROVENTIL HFA,VENTOLIN HFA) 90 mcg/act inhaler  Self Yes No   Sig: Inhale 2 puffs every 6 (six) hours as needed   aspirin 81 MG tablet  Self Yes No   Sig: Take 81 mg by mouth daily   atorvastatin (LIPITOR) 20 mg tablet  Self No No   Sig: take 1 tablet by mouth once daily   carvedilol (Coreg) 3 125 mg tablet   No No   Sig: Take 1 tablet (3 125 mg total) by mouth 2 (two) times a day with meals   glucose blood test strip  Self No No   Sig: Tests tid   lidocaine-prilocaine (EMLA) cream  Self Yes No   Sig: APPLY 1/2 HOUR PRIOR TO DIALYSIS AS DIRECTED   losartan (COZAAR) 25 mg tablet  Self No No   Sig: Take 0 5 tablets (12 5 mg total) by mouth daily at bedtime      Facility-Administered Medications: None       Past Medical History:   Diagnosis Date    Acute osteomyelitis of metatarsal bone of left foot ([facility] ) 6/13/2016    Anemia     BMI 37 0-37 9, adult 10/9/2018    CAD in native artery     Cellulitis of foot, left 2/2/2016    Chronic kidney disease     Depression     Diabetes mellitus ([facility] )     History of implantable cardioverter-defibrillator (ICD) placement     [employer]    Hypertension     Myocardial infarct, old 2006    in setting of cocaine use   Obesity     Peripheral neuropathy     Severe obesity (BMI 35 0-39  9) with comorbidity ([place] [address] ) 8/30/2016    Stage 4 chronic kidney disease ([place] [address] ) 12/13/2018    Toe osteomyelitis, right ([place] [address] ) 1/17/2018    Added automatically from request for surgery [id number]    Ventricular tachycardia [facility])        Past Surgical History:   Procedure Laterality Date    APPENDECTOMY      CARDIAC DEFIBRILLATOR PLACEMENT  2006    MASTECTOMY FOR GYNECOMASTIA  09/2015    REDUCTION MAMMAPLASTY  09/29/2015    TOE AMPUTATION      Hallux amputation    TOE AMPUTATION Right 1/18/2018    Procedure: AMPUTATION 2ND TOE;  Surgeon: Taisha Morejon DPM;  Location: [facility];  Service: Podiatry    WOUND DEBRIDEMENT Left 6/16/2016    Procedure: DEBRIDEMENT FOOT/TOE [facility]); Transmetatasral vs Lisfranc amputation , bone biopsy,;  Surgeon: Taisha Morejon DPM;  Location: [facility];  Service:        Family History   Adopted: Yes     I have reviewed and agree with the history as documented      E-Cigarette/Vaping    E-Cigarette Use Never User      E-Cigarette/Vaping Substances    Nicotine No     THC No     CBD No     Flavoring No     Other No     Unknown No      Social History     Tobacco Use    Smoking status: Former Smoker Packs/day: 1 00     Years: 18 00     Pack years: 18 00     Types: Cigarettes     Start date: 79     Quit date: 2006     Years since quittin 3    Smokeless tobacco: Former User    Tobacco comment: quite smoking    Vaping Use    Vaping Use: Never used   Substance Use Topics    Alcohol use: Not Currently    Drug use: Not Currently     Frequency: 1 0 times per week     Types: Marijuana       Review of Systems   Constitutional: Positive for appetite change and fatigue  Negative for chills and fever  HENT: Positive for congestion  Negative for rhinorrhea  Respiratory: Positive for cough and shortness of breath  Cardiovascular: Positive for chest pain (Only with coughing)  Negative for leg swelling  Gastrointestinal: Positive for diarrhea  Negative for abdominal pain, constipation, nausea and vomiting  Genitourinary: Negative for dysuria and flank pain  Musculoskeletal: Negative for arthralgias and myalgias  Skin: Negative for rash and wound  Neurological: Negative for dizziness, weakness, numbness and headaches  Psychiatric/Behavioral: Negative for behavioral problems  Physical Exam  Physical Exam  Vitals and nursing note reviewed  Constitutional:       Appearance: He is well-developed  HENT:      Head: Normocephalic and atraumatic  Mouth/Throat:      Mouth: Mucous membranes are moist    Eyes:      Conjunctiva/sclera: Conjunctivae normal    Cardiovascular:      Rate and Rhythm: Normal rate and regular rhythm  Heart sounds: No murmur heard  Pulmonary:      Effort: Pulmonary effort is normal  No tachypnea, accessory muscle usage or respiratory distress  Breath sounds: Examination of the right-upper field reveals wheezing  Examination of the left-upper field reveals wheezing  Examination of the right-middle field reveals rhonchi  Wheezing and rhonchi present  No decreased breath sounds or rales  Abdominal:      Palpations: Abdomen is soft  Tenderness:  There is no abdominal tenderness  Musculoskeletal:         General: Normal range of motion  Cervical back: Neck supple  Right lower leg: Tenderness present  No edema  Left lower leg: No tenderness  No edema  Skin:     General: Skin is warm and dry  Coloration: Skin is not cyanotic  Neurological:      Mental Status: He is alert           Vital Signs  ED Triage Vitals   Temperature Pulse Respirations Blood Pressure SpO2   05/18/22 0158 05/18/22 0158 05/18/22 0158 05/18/22 0158 05/18/22 0158   99 2 °F (37 3 °C) 70 20 138/73 100 %      Temp Source Heart Rate Source Patient Position - Orthostatic VS BP Location FiO2 (%)   05/18/22 0158 05/18/22 0158 05/18/22 1525 05/18/22 0158 --   Oral Monitor Lying Right arm       Pain Score       05/18/22 1133       No Pain           Vitals:    05/20/22 1630 05/20/22 1700 05/20/22 1730 05/20/22 1740   BP: 100/58 111/62 118/60 131/61   Pulse: 61 73 62 72   Patient Position - Orthostatic VS:    Lying         Visual Acuity      ED Medications  Medications      aspirin tablet 325 mg (325 mg Oral Given 5/18/22 0351)          Diagnostic Studies  Results Reviewed     Procedure Component Value Units Date/Time    CBC [[id number]]  (Abnormal) Collected: 05/19/22 0525    Lab Status: Final result Specimen: Blood from Arm, Right Updated: 05/19/22 0833     WBC 4 89 Thousand/uL      RBC 2 88 Million/uL      Hemoglobin 10 4 g/dL      Hematocrit 29 9 %       fL      MCH 36 1 pg      MCHC 34 8 g/dL      RDW 12 8 %      Platelets 570 Thousands/uL      MPV 11 7 fL     Basic metabolic panel [[id number]]  (Abnormal) Collected: 05/19/22 0525    Lab Status: Final result Specimen: Blood from Arm, Right Updated: 05/19/22 0810     Sodium 136 mmol/L      Potassium 4 0 mmol/L      Chloride 100 mmol/L      CO2 21 mmol/L      ANION GAP 15 mmol/L      BUN 43 mg/dL      Creatinine 10 18 mg/dL      Glucose 137 mg/dL      Calcium 8 3 mg/dL      eGFR 5 ml/min/1 73sq m     Narrative:      Consolidated [place] [employer] guidelines for Chronic Kidney Disease (CKD):     Stage 1 with normal or high GFR (GFR > 90 mL/min/1 73 square meters)    Stage 2 Mild CKD (GFR = 60-89 mL/min/1 73 square meters)    Stage 3A Moderate CKD (GFR = 45-59 mL/min/1 73 square meters)    Stage 3B Moderate CKD (GFR = 30-44 mL/min/1 73 square meters)    Stage 4 Severe CKD (GFR = 15-29 mL/min/1 73 square meters)    Stage 5 End Stage CKD (GFR <15 mL/min/1 73 square meters)  Note: GFR calculation is accurate only with a steady state creatinine    C-reactive protein [[id number]]  (Abnormal) Collected: 05/19/22 0525    Lab Status: Final result Specimen: Blood from Arm, Right Updated: 05/19/22 0808      4 mg/L     D-dimer, quantitative [[id number]]  (Abnormal) Collected: 05/19/22 0627    Lab Status: Final result Specimen: Blood from Arm, Right Updated: 05/19/22 0743     D-Dimer, Quant 2 18 ug/ml FEU     Narrative: In the evaluation for possible pulmonary embolism, in the appropriate (Well's Score of 4 or less) patient, the age adjusted d-dimer cutoff for this patient can be calculated as:    Age x 0 01 (in ug/mL) for Age-adjusted D-dimer exclusion threshold for a patient over 50 years  Magnesium [[id number]]  (Normal) Collected: 05/19/22 0525    Lab Status: Final result Specimen: Blood from Arm, Right Updated: 05/19/22 0709     Magnesium 1 9 mg/dL     Phosphorus [[id number]]  (Normal) Collected: 05/19/22 0525    Lab Status: Final result Specimen: Blood from Arm, Right Updated: 05/19/22 0709     Phosphorus 3 5 mg/dL     Lactic acid, plasma [[id number]]  (Normal) Collected: 05/18/22 0949    Lab Status: Final result Specimen: Blood from Arm, Right Updated: 05/18/22 1029     LACTIC ACID 1 2 mmol/L     Narrative:      Result may be elevated if tourniquet was used during collection      HS Troponin I 2hr [[id number]]  (Abnormal) Collected: 05/18/22 0435    Lab Status: Final result Specimen: Blood from Arm, Right Updated: 05/18/22 0743     hs TnI 2hr 294 ng/L      Delta 2hr hsTnI -123 ng/L     APTT [[id number]]  (Normal) Collected: 05/18/22 0633    Lab Status: Final result Specimen: Blood from Arm, Right Updated: 05/18/22 0715     PTT 31 seconds     NT-BNP PRO [[id number]]  (Abnormal) Collected: 05/18/22 0233    Lab Status: Final result Specimen: Blood from Arm, Right Updated: 05/18/22 0422     NT-proBNP 66,220 pg/mL     HS Troponin 0hr (reflex protocol) [[id number]]  (Abnormal) Collected: 05/18/22 0233    Lab Status: Final result Specimen: Blood from Arm, Right Updated: 05/18/22 0326     hs TnI 0hr 417 ng/L     Comprehensive metabolic panel [[id number]]  (Abnormal) Collected: 05/18/22 0233    Lab Status: Final result Specimen: Blood from Arm, Right Updated: 05/18/22 0317     Sodium 136 mmol/L      Potassium 5 2 mmol/L      Chloride 103 mmol/L      CO2 18 mmol/L      ANION GAP 15 mmol/L      BUN 67 mg/dL      Creatinine 14 53 mg/dL      Glucose 107 mg/dL      Calcium 9 2 mg/dL      AST 26 U/L      ALT 21 U/L      Alkaline Phosphatase 41 U/L      Total Protein 8 8 g/dL      Albumin 3 8 g/dL      Total Bilirubin 0 53 mg/dL      eGFR 3 ml/min/1 73sq m     Narrative:      National Kidney Disease Foundation guidelines for Chronic Kidney Disease (CKD):     Stage 1 with normal or high GFR (GFR > 90 mL/min/1 73 square meters)    Stage 2 Mild CKD (GFR = 60-89 mL/min/1 73 square meters)    Stage 3A Moderate CKD (GFR = 45-59 mL/min/1 73 square meters)    Stage 3B Moderate CKD (GFR = 30-44 mL/min/1 73 square meters)    Stage 4 Severe CKD (GFR = 15-29 mL/min/1 73 square meters)    Stage 5 End Stage CKD (GFR <15 mL/min/1 73 square meters)  Note: GFR calculation is accurate only with a steady state creatinine    CBC and differential [[id number]]  (Abnormal) Collected: 05/18/22 0233    Lab Status: Final result Specimen: Blood from Arm, Right Updated: 05/18/22 0259     WBC 7 71 Thousand/uL      RBC 3 33 Million/uL      Hemoglobin 12 1 g/dL      Hematocrit 35 8 %       fL      MCH 36 3 pg      MCHC 33 8 g/dL      RDW 12 9 %      MPV 10 7 fL      Platelets 119 Thousands/uL      nRBC 0 /100 WBCs      Neutrophils Relative 51 %      Immat GRANS % 0 %      Lymphocytes Relative 30 %      Monocytes Relative 19 %      Eosinophils Relative 0 %      Basophils Relative 0 %      Neutrophils Absolute 3 86 Thousands/µL      Immature Grans Absolute 0 03 Thousand/uL      Lymphocytes Absolute 2 31 Thousands/µL      Monocytes Absolute 1 47 Thousand/µL      Eosinophils Absolute 0 01 Thousand/µL      Basophils Absolute 0 03 Thousands/µL                  VAS lower limb venous duplex study, unilateral/limited   Final Result by Chele Vyas MD (05/18 1631)      XR chest 1 view portable   ED Interpretation by Harshal Harding PA-C (05/18 0300)   Cardiomegaly, though AP is increased compared to previous CXR  ICD  No evidence of infiltrate, pneumothorax, effusion, or acute cardiopulmonary disease as interpreted by me      Final Result by Jarett Sanabria MD (05/18 1002)      Enlargement of cardiac silhouette  No acute pulmonary disease  Workstation performed: [id number]                    Procedures  ECG 12 Lead Documentation Only    Date/Time: 5/18/2022 2:40 AM  Performed by: Harshal Harding PA-C  Authorized by: Harshal Harding PA-C     Comments:      Normal sinus rhythm at 71 beats per minute, OR interval of 204 milliseconds, normal QTC, normal axis, T-wave inversions noted in lead I, II, III, aVF, no ST elevations or depressions      ECG 12 Lead Documentation Only    Date/Time: 5/18/2022 3:46 AM  Performed by: Harshal Harding PA-C  Authorized by: Harshal Harding PA-C     Comments:      SR with PVCs at 76 BPM, no ST elevation or depression, no significant changes   ECG 12 Lead Documentation Only    Date/Time: 5/18/2022 4:35 AM  Performed by: Harshal Harding PA-C  Authorized by: Harshal Harding PA-C Comments:      NSR at 70 BPM, no ST elevation or depression, no significant change             ED Course  The patient is a 62-year-old male  with history of MI, ICD, CHF with LVEF 30-35%, hypertension, diabetes, dialysis on Monday, Wednesday, Friday who presents to the emergency department for evaluation of a 3 day history of dyspnea  He was diagnosed with COVID 2 days ago  On exam, patient is in no acute distress  Does have rhonchi that clears with coughing in the right middle lobe  Minimal wheezing in the bilateral apices  Vital signs stable  No hypoxia  SpO2 remains % on room air while patient is speaking  Patient does appear fatigued, however has 5/5 strength in all extremities  No tachypnea, accessory muscle use noted  Will obtain chest x-ray  Will obtain troponin, EKG, proBNP  Will obtain CBC, CMP  ED Course as of 05/20/22 1947   Wed May 18, 2022   0346 hs TnI 0hr(!): 417  Patient reportedly having hiccups at this time which are recurrent  Has no history of similar  Will obtain repeat EKG to rule out inferior MI as cause of hiccups  0455 NT-proBNP(!): 66,220       CXR reveals increased cardiomegaly  proBNP elevated compared to patient's baseline  Troponin is elevated; EKGs nonischemic  Patient has not taken in medication in several days; will give full dose ASA  Will admit patient to [facility] for dialysis, CHF exacerbation  At the time of discharge, the patient is in no acute distress  I discussed with the patient the diagnosis, treatment plan, and plan for admission; they were given the opportunity to ask questions and verbalized understanding  SBIRT 22yo+    Flowsheet Row Most Recent Value   SBIRT (25 yo +)    In order to provide better care to our patients, we are screening all of our patients for alcohol and drug use  Would it be okay to ask you these screening questions? Yes Filed at: 05/18/2022 0202   Initial Alcohol Screen: US AUDIT-C     1   How often do you have a drink containing alcohol? 0 Filed at: 05/18/2022 0202   2  How many drinks containing alcohol do you have on a typical day you are drinking? 0 Filed at: 05/18/2022 0202   3a  Male UNDER 65: How often do you have five or more drinks on one occasion? 0 Filed at: 05/18/2022 0202   Audit-C Score 0 Filed at: 05/18/2022 0202   BERTRAM: How many times in the past year have you    Used an illegal drug or used a prescription medication for non-medical reasons? Never Filed at: 05/18/2022 0202          MDM  Number of Diagnoses or Management Options  CHF exacerbation [facility]): new and requires workup  COVID-19: established and worsening  Elevated troponin: new and requires workup     Amount and/or Complexity of Data Reviewed  Clinical lab tests: ordered and reviewed  Tests in the radiology section of CPT®: ordered and reviewed  Decide to obtain previous medical records or to obtain history from someone other than the patient: yes  Review and summarize past medical records: yes  Discuss the patient with other providers: yes (SLIM)  Independent visualization of images, tracings, or specimens: yes         Disposition  Final diagnoses:   CHF exacerbation ([facility] )   COVID-19   Elevated troponin     Time reflects when diagnosis was documented in both MDM as applicable and the Disposition within this note     Time User Action Codes Description Comment    5/18/2022  5:35 AM Filiberto Deep Add [I50 9] CHF exacerbation ([facility] )     5/18/2022  5:35 AM [place] Deep Add [U07 1] COVID-19     5/18/2022  5:35 AM Filiberto Deep Add [R77 8] Elevated troponin     5/18/2022  6:07 AM Mitchel Gutierrez Older Add [N18 6] ESRD (end stage renal disease) [facility])       ED Disposition     ED Disposition   Admit    Condition   Stable    Date/Time   Wed May 18, 2022  5:30 AM    Comment   Case was discussed with TAMMY and the patient's admission status was agreed to be Admission Status: inpatient status to the service of Dr Rosa Isela Morris              Follow-up Information     Follow up With Specialties Details Why Contact Info    Juan Jose Wilson MD Family Medicine Follow up in 1 week(s)  [address]    [address]            Discharge Medication List as of 5/20/2022  3:48 PM      CONTINUE these medications which have NOT CHANGED    Details   Adhesive Bandages MISC by Does not apply route 3 (three) times a day, Starting Wed 5/6/2020, Normal      albuterol (PROVENTIL HFA,VENTOLIN HFA) 90 mcg/act inhaler Inhale 2 puffs every 6 (six) hours as needed, Starting Sun 2/27/2022, Historical Med      Alcohol Swabs (ALCOHOL PREP) PADS by Does not apply route 3 (three) times a day, Starting Wed 5/6/2020, Normal      aspirin 81 MG tablet Take 81 mg by mouth daily, Historical Med      atorvastatin (LIPITOR) 20 mg tablet take 1 tablet by mouth once daily, Normal      Blood Glucose Monitoring Suppl KIT by Does not apply route 3 (three) times a day ONE TOUCH DEVISE  TEST BLOOD SUGARS 3 TIMES DAILY, Starting Wed 5/6/2020, Print      carvedilol (Coreg) 3 125 mg tablet Take 1 tablet (3 125 mg total) by mouth 2 (two) times a day with meals, Starting Thu 3/10/2022, No Print      glucose blood test strip Tests tid, Normal      Lancets (ONETOUCH ULTRASOFT) lancets Test tid, Normal      lidocaine-prilocaine (EMLA) cream APPLY 1/2 HOUR PRIOR TO DIALYSIS AS DIRECTED, Historical Med         STOP taking these medications       losartan (COZAAR) 25 mg tablet Comments:   Reason for Stopping:               Outpatient Discharge Orders   Discharge Diet     Activity as tolerated       PDMP Review     None          ED Provider  Electronically Signed by           Jennifer Cohn PA-C  05/20/22 1950

## 2022-05-18 NOTE — ASSESSMENT & PLAN NOTE
· Troponin 417, trend for 3  With EKGs  · Initial EKG without ischemic changes, likely his elevated troponin is from COVID infection as well as ESRD

## 2022-05-18 NOTE — PLAN OF CARE
Post-Dialysis RN Treatment Note    Blood Pressure:  Pre 107/57 mm/Hg  Post 132/64 mmHg   EDW  106 5 kg    Weight:  Pre 103 kg   Post 102 2 kg   Mode of weight measurement: Bed Scale   Volume Removed  1000 ml    Treatment duration 180 minutes    NS given  No    Treatment shortened? Yes, describe: 3 hrs due to covid +   Medications given during Rx Hectorol 3mcg   Estimated Kt/V  Not Applicable   Access type: Permacath/TDC   Access Issues: No Able to maintain bfr 450 for entire tx   Report called to primary nurse   Yes Gamal Haley RN    HD tx plan: 3 hrs removing 1L as nunu  2K bath for K 5 2 5/18  HD catheter was placed on Sat at [facility] per pt       Problem: METABOLIC, FLUID AND ELECTROLYTES - ADULT  Goal: Electrolytes maintained within normal limits  Description: INTERVENTIONS:  - Monitor labs and assess patient for signs and symptoms of electrolyte imbalances  - Administer electrolyte replacement as ordered  - Monitor response to electrolyte replacements, including repeat lab results as appropriate  - Instruct patient on fluid and nutrition as appropriate  Outcome: Progressing     Problem: METABOLIC, FLUID AND ELECTROLYTES - ADULT  Goal: Fluid balance maintained  Description: INTERVENTIONS:  - Monitor labs   - Monitor I/O and WT  - Instruct patient on fluid and nutrition as appropriate  - Assess for signs & symptoms of volume excess or deficit  Outcome: Progressing

## 2022-05-18 NOTE — ASSESSMENT & PLAN NOTE
· Continue ASA 81 daily, cardiac catheterization from February 2022 reviewed there is significant multivessel stenosis    · At this time patient is chest pain-free

## 2022-05-18 NOTE — ASSESSMENT & PLAN NOTE
Lab Results   Component Value Date    EGFR 3 05/18/2022    EGFR 11 11/01/2021    EGFR 8 (L) 08/05/2021    CREATININE 14 53 (H) 05/18/2022    CREATININE 6 13 (H) 11/01/2021    CREATININE 7 47 (H) 08/05/2021     · Hemodialysis Monday Wednesday Friday    · Nephrology consultation ordered

## 2022-05-18 NOTE — H&P
[address]  H&P- Radhika Cline  1965, 61 y o  male MRN: [id number]  Unit/Bed#: ED 08 Encounter: [id number]  Primary Care Provider: Delisa Marquez MD   Date and time admitted to hospital: 5/18/2022  1:54 AM    Pain and swelling of right lower leg  Assessment & Plan  · Vas duplex ordered  · Initiate VTE heparin until DVT can not be ruled out with vas duplex  Pneumonia due to COVID-19 virus  Assessment & Plan  · Tested positive COVID PCR 5/16  Infection with COVID likely nosocomial from recent hospital stay at [facility] for AV fistula surgery  · Trend CRP and D-dimer as necessary  · Not requiring oxygen at this time so can treat symptomatically, no need for IV steroids  If he does require oxygen I would defer the initiation of remdesivir to nephrology's is preference  Chronic systolic CHF (congestive heart failure) (HCC)  Assessment & Plan  Wt Readings from Last 3 Encounters:   03/08/22 107 kg (235 lb)   02/08/22 112 kg (247 lb)   11/09/21 112 kg (247 lb 9 6 oz)     · Relatively euvolemic on physical exam, I suspect majority of his shortness of breath is coming from [place]  Defer diuretic therapy schedule to Nephrology who will most likely be doing HD inpatient today  This patient does not take diuretics at home  · His BNP is significantly elevated from baseline, this could be possibly from COVID infection  · Daily weights, I/O, 2 g Na diet  · Continue home GD MT  · ICD in place    ESRD (end stage renal disease) [facility])  Assessment & Plan  Lab Results   Component Value Date    EGFR 3 05/18/2022    EGFR 11 11/01/2021    EGFR 8 (L) 08/05/2021    CREATININE 14 53 (H) 05/18/2022    CREATININE 6 13 (H) 11/01/2021    CREATININE 7 47 (H) 08/05/2021     · Hemodialysis Monday Wednesday Friday    · Nephrology consultation ordered    Ischemic dilated cardiomyopathy [facility])  Assessment & Plan  · Most recent information on EF is 40% in 2021  · Tele  · CXR on admission today with worsening dilated cardiomyopathy, cephalization of vessels  Patient is experiencing some fluid overload symptoms  He is not taking his medications and he has acute viral illness and he was turned away from dialysis on Monday because of positive COVID  · Nephrology consultation for dialysis, I will defer diuretic schedule to them  · Echocardiogram ordered    Dyslipidemia, goal LDL below 70  Assessment & Plan  · Continue home statin therapy    CAD in native artery  Assessment & Plan  · Continue ASA 81 daily, cardiac catheterization from February 2022 reviewed there is significant multivessel stenosis  · At this time patient is chest pain-free    Hypertension  Assessment & Plan  · Continue home losartan    Elevated troponin  Assessment & Plan  · Troponin 417, trend for 3  With EKGs  · Initial EKG without ischemic changes, likely his elevated troponin is from COVID infection as well as ESRD  VTE Pharmacologic Prophylaxis: VTE Score: 4 Moderate Risk (Score 3-4) - Pharmacological DVT Prophylaxis Ordered: heparin drip  Code Status: Level 1 - Full code  Discussion with family: Patient declined call to   Anticipated Length of Stay: Patient will be admitted on an inpatient basis with an anticipated length of stay of greater than 2 midnights secondary to trending labs, monitoring sxs, nephrology consult and HD  Total Time for Visit, including Counseling / Coordination of Care: 45 minutes Greater than 50% of this total time spent on direct patient counseling and coordination of care  Chief Complaint:  Dyspnea x3 days    History of Present Illness:  Sondra Miller  is a 61 y o  male with a PMH of ESRD on HD, secondary hyperparathyroidism and mineral bone disease from CKD co hypertension, ischemic cardio myopathy with history of cardiogenic fact, CHF, CAD, hyperlipidemia who presents with dyspnea x3 days    Patient was recently hospitalized at [facility] for an AV fistula operation, prior to his discharge he did notice that he was feeling winded when walking to and from the bathroom  When he got home from the hospital only did was lying bed, he did not want to eat, he did not take any of his home medications  Patient states that yesterday when he was walking to and from the bathroom at home his right leg was very tender and is still painful especially with walking but also while just resting  He is coughing up brown sputum  Afebrile  Patient will be admitted to rule out VTE, treatment for COVID pneumonia, treatment for fluid overload with Nephrology consultation and dialysis       Review of Systems:  Review of Systems   Constitutional: Positive for activity change and appetite change  HENT: Negative  Eyes: Negative  Respiratory: Positive for cough and shortness of breath  Cardiovascular: Positive for leg swelling  Gastrointestinal: Positive for diarrhea  Endocrine: Negative  Genitourinary: Negative  Musculoskeletal: Positive for myalgias  Skin: Negative  Allergic/Immunologic: Positive for immunocompromised state  Neurological: Negative  Hematological: Negative  Psychiatric/Behavioral: Negative  Past Medical and Surgical History:   Past Medical History:   Diagnosis Date    Acute osteomyelitis of metatarsal bone of left foot ([facility] [address] ) 6/13/2016    Anemia     BMI 37 0-37 9, adult 10/9/2018    CAD in native artery     Cellulitis of foot, left 2/2/2016    Chronic kidney disease     Depression     Diabetes mellitus ([address] )     History of implantable cardioverter-defibrillator (ICD) placement     Medtronic    Hypertension     Myocardial infarct, old 2006    in setting of cocaine use   Obesity     Peripheral neuropathy     Severe obesity (BMI 35 0-39  9) with comorbidity ([address] ) 8/30/2016    Stage 4 chronic kidney disease ([address] ) 12/13/2018    Toe osteomyelitis, right ([facility] [address] ) 1/17/2018    Added automatically from request for surgery [id number]   [facility] tachycardia [facility])        Past Surgical History:   Procedure Laterality Date    APPENDECTOMY      CARDIAC DEFIBRILLATOR PLACEMENT  2006    MASTECTOMY FOR GYNECOMASTIA  09/2015    REDUCTION MAMMAPLASTY  09/29/2015    TOE AMPUTATION      Hallux amputation    TOE AMPUTATION Right 1/18/2018    Procedure: AMPUTATION 2ND TOE;  Surgeon: Geraldo Tang DPM;  [facility];  Service: Podiatry    WOUND DEBRIDEMENT Left 6/16/2016    Procedure: DEBRIDEMENT FOOT/TOE [facility] OUT); Transmetatasral vs Lisfranc amputation , bone biopsy,;  Surgeon: Geraldo Tang DPM;  [facility];  Service:        Meds/Allergies:  Prior to Admission medications    Medication Sig Start Date End Date Taking?  Authorizing Provider   Adhesive Bandages MISC by Does not apply route 3 (three) times a day 5/6/20   Jolynn Talley MD   albuterol (PROVENTIL HFA,VENTOLIN HFA) 90 mcg/act inhaler Inhale 2 puffs every 6 (six) hours as needed 2/27/22   Historical Provider, MD   Alcohol Swabs (ALCOHOL PREP) PADS by Does not apply route 3 (three) times a day 5/6/20   Jolynn Talley MD   aspirin 81 MG tablet Take 81 mg by mouth daily    Historical Provider, MD   atorvastatin (LIPITOR) 20 mg tablet take 1 tablet by mouth once daily 12/2/21   Jolynn Talley MD   Blood Glucose Monitoring Suppl KIT by Does not apply route 3 (three) times a day ONE TOUCH DEVISE  TEST BLOOD SUGARS 3 TIMES DAILY 5/6/20   Jolynn Talley MD   carvedilol (Coreg) 3 125 mg tablet Take 1 tablet (3 125 mg total) by mouth 2 (two) times a day with meals 3/10/22   Jolynn Talley MD   glucose blood test strip Tests tid 5/6/20   Jolynn Talley MD   Lancets [facility] ULTRASOFT) lancets Test tid 5/6/20   Jolynn Talley MD   lidocaine-prilocaine (EMLA) cream APPLY 1/2 HOUR PRIOR TO DIALYSIS AS DIRECTED 8/4/21   Historical Provider, MD   losartan (COZAAR) 25 mg tablet Take 0 5 tablets (12 5 mg total) by mouth daily at bedtime 11/2/21   Johann Escudero MD   nitroglycerin (NITROSTAT) 0 4 mg SL tablet Place 0 4 mg under the tongue  Patient not taking: No sig reported 21  Historical Provider, MD     I have reviewed home medications using recent Epic encounter  Allergies: Allergies   Allergen Reactions    Penicillins Hives and Other (See Comments)     Ancef TAKEN,Childhood  Other reaction(s): Hives       Social History:  Marital Status: Single   Occupation: noncontributory  Patient Pre-hospital Living Situation: Home  Patient Pre-hospital Level of Mobility: walks  Patient Pre-hospital Diet Restrictions: none  Substance Use History:   Social History     Substance and Sexual Activity   Alcohol Use Not Currently     Social History     Tobacco Use   Smoking Status Former Smoker    Packs/day: 1 00    Years: 18 00    Pack years: 18 00    Types: Cigarettes    Start date:     Quit date:     Years since quittin 3   Smokeless Tobacco Former [place]    quite smoking      Social History     Substance and Sexual Activity   Drug Use Not Currently    Frequency: 1 0 times per week    Types: Marijuana       Family History:  Family History   Adopted: Yes       Physical Exam:     Vitals:   Blood Pressure: 134/72 (22 0642)  Pulse: 63 (22 0642)  Temperature: 99 2 °F (37 3 °C) (22 0158)  Temp Source: Oral (22 0158)  Respirations: 18 (22 0642)  SpO2: 97 % (22 2411)    Physical Exam  Vitals and nursing note reviewed  HENT:      Head: Normocephalic and atraumatic  Nose: Nose normal       Mouth/Throat:      Mouth: Mucous membranes are dry  Eyes:      Extraocular Movements: Extraocular movements intact  Conjunctiva/sclera: Conjunctivae normal    Neck:      Comments: port  Cardiovascular:      Rate and Rhythm: Normal rate and regular rhythm  Pulses: Normal pulses  Pulmonary:      Breath sounds: Rhonchi present  Abdominal:      General: Bowel sounds are normal       Palpations: Abdomen is soft     Musculoskeletal: General: Swelling and tenderness present  Comments: fistula AV   Skin:     General: Skin is warm and dry  Neurological:      General: No focal deficit present  Mental Status: He is alert  Psychiatric:         Mood and Affect: Mood normal          Behavior: Behavior normal           Additional Data:     Lab Results:  Results from last 7 days   Lab Units 05/18/22  0233   WBC Thousand/uL 7 71   HEMOGLOBIN g/dL 12 1   HEMATOCRIT % 35 8*   PLATELETS Thousands/uL 163   NEUTROS PCT % 51   LYMPHS PCT % 30   MONOS PCT % 19*   EOS PCT % 0     Results from last 7 days   Lab Units 05/18/22  0233   SODIUM mmol/L 136   POTASSIUM mmol/L 5 2   CHLORIDE mmol/L 103   CO2 mmol/L 18*   BUN mg/dL 67*   CREATININE mg/dL 14 53*   ANION GAP mmol/L 15*   CALCIUM mg/dL 9 2   ALBUMIN g/dL 3 8   TOTAL BILIRUBIN mg/dL 0 53   ALK PHOS U/L 41*   ALT U/L 21   AST U/L 26   GLUCOSE RANDOM mg/dL 107                       Imaging: Personally reviewed the following imaging: chest xray  XR chest 1 view portable   ED Interpretation by Jaida Chandra PA-C (05/18 0300)   Cardiomegaly, though AP is increased compared to previous CXR  ICD  No evidence of infiltrate, pneumothorax, effusion, or acute cardiopulmonary disease as interpreted by me      VAS lower limb venous duplex study, unilateral/limited    (Results Pending)       EKG and Other Studies Reviewed on Admission:   · EKG: I read the emergency room provider;s interpretation of the EKG       ** Please Note: This note has been constructed using a voice recognition system   **

## 2022-05-18 NOTE — ASSESSMENT & PLAN NOTE
· Tested positive COVID PCR 5/16  Infection with COVID likely nosocomial from recent hospital stay at [facility] for AV fistula surgery  · Trend CRP and D-dimer as necessary  · Not requiring oxygen at this time so can treat symptomatically, no need for IV steroids  If he does require oxygen I would defer the initiation of remdesivir to nephrology's is preference

## 2022-05-18 NOTE — ASSESSMENT & PLAN NOTE
· Troponin 417 initially, trended down  · Initial EKG without ischemic changes  · Likely secondary to COVID infection as well as end-stage renal disease  · Patient denies any chest pain

## 2022-05-18 NOTE — ASSESSMENT & PLAN NOTE
Wt Readings from Last 3 Encounters:   03/08/22 107 kg (235 lb)   02/08/22 112 kg (247 lb)   11/09/21 112 kg (247 lb 9 6 oz)     · Patient appears to be euvolemic  · Will continue volume management with hemodialysis  · Continue home medications as tolerated

## 2022-05-18 NOTE — ASSESSMENT & PLAN NOTE
· Most recent information on EF is 40% in 2021  · Appears to be stable  · Continue home medications  · Outpatient follow-up with cardiology

## 2022-05-18 NOTE — ED NOTES
RN messaged attending in regards to heparin order  Verified that order is being given prophylacticly for DVT/PE and dose is to be given before PTT results and duplex is performed        Patricia Mendez RN  05/18/22 1379

## 2022-05-18 NOTE — CONSULTS
Consultation - Nephrology   Miesha Perea  61 y o  male MRN: [id number]  Unit/Bed#: ED 08 Encounter: [id number]    ASSESSMENT/PLAN:  ESRD on HD:  Hemodialysis dependent x2 years  Following with [facility] Transplant Team   -receiving maintenance dialysis on Monday Wednesday Friday schedule at [facility] in [place]   -reports last hemodialysis treatment was on Monday   -continue with HD as scheduled  -EDW:  106 5 kg  Access:  PermCath, site intact  -left upper extremity AVG, recent admission on 05/12 at [facility] with occluded fistula, underwent AVG placement and ligation of left upper extremity fistula  Blood pressure:  Remains acceptable   -continue UF with HD as BP tolerates  -OP medications:  Coreg 3 125 mg 2 times per day, losartan 25 mg every evening   -okay to resume home medications  Anemia in CKD:   -Hgb currently 12 1   -FRED:  None, hemoglobin at goal   -goal Hgb 10-12 g/dL  -continue to monitor and transfuse as needed for Hgb <7 0  MBD in CKD:   -continue to monitor PTH, phos, and Mg as OP  -checking Mag and phos  -recommend renal diet    -continue Hectorol 3 mcg with dialysis and renal vitamin  Volume status:  Most recent echo per Care everywhere with EF of 25-30% and grade 2 diastolic dysfunction   -checking chest x-ray   -echocardiogram ordered  -BNP greater than 66,000  -continue UF as BP allows  -recommend fluid restriction  Electrolytes:   -borderline elevated potassium  Will continue to monitor and trend with hemodialysis  Recommend renal diet   -anion gap acidosis, will continue to monitor and trend with HD  Will check lactic acid   -continue to monitor and trend with HD      COVID-19:  Positive on 05/16/2022  Further treatment per primary care team     Right lower extremity pain:  -checking venous duplex to rule out DVT        HISTORY OF PRESENT ILLNESS:  Requesting Physician: Amaury Serrato, *  Reason for Consult: ESRD on HD    Kye PARK Claire Hernandez  is a 61y o  year old male with history of ESRD on HD, hypertension, recent AVG creation, CHF, who was admitted to [facility] after presenting with reports of throat discomfort, cough, chills, weakness, and right lower extremity pain  The patient reports that the symptoms began a couple days ago  He denies any chest discomfort  He denies nausea, vomiting, diarrhea  He reports urinating slightly more than usual   He denies any NSAID use  He denies missing his dialysis treatments  A renal consultation is requested today for assistance in the management of ESRD on HD  PAST MEDICAL HISTORY:  Past Medical History:   Diagnosis Date    Acute osteomyelitis of metatarsal bone of left foot ([facility] [address] ) 6/13/2016    Anemia     BMI 37 0-37 9, adult 10/9/2018    CAD in native artery     Cellulitis of foot, left 2/2/2016    Chronic kidney disease     Depression     Diabetes mellitus ([facility] [facility] )     History of implantable cardioverter-defibrillator (ICD) placement     [employer]tronic    Hypertension     Myocardial infarct, old 2006    in setting of cocaine use   Obesity     Peripheral neuropathy     Severe obesity (BMI 35 0-39  9) with comorbidity ([facility] [facility] ) 8/30/2016    Stage 4 chronic kidney disease ([facility] ) 12/13/2018    Toe osteomyelitis, right ([facility] [address] ) 1/17/2018    Added automatically from request for surgery [id number]    Ventricular tachycardia [facility])        PAST SURGICAL HISTORY:  Past Surgical History:   Procedure Laterality Date    APPENDECTOMY      CARDIAC DEFIBRILLATOR PLACEMENT  2006    MASTECTOMY FOR GYNECOMASTIA  09/2015    REDUCTION MAMMAPLASTY  09/29/2015    TOE AMPUTATION      Hallux amputation    TOE AMPUTATION Right 1/18/2018    Procedure: AMPUTATION 2ND TOE;  Surgeon: Hanane Jarquin DPM;  [facility];  Service: Podiatry    WOUND DEBRIDEMENT Left 6/16/2016    Procedure: DEBRIDEMENT FOOT/TOE [facility] OUT);  Transmetatasral vs Lisfranc amputation , bone biopsy,;  Surgeon: Hanane Jarquin DPM;  [facility];  Service:        ALLERGIES:  Allergies   Allergen Reactions    Penicillins Hives and Other (See Comments)     Ancef TAKEN,Childhood  Other reaction(s): Hives       SOCIAL HISTORY:  Social History     Substance and Sexual Activity   Alcohol Use Not Currently     Social History     Substance and Sexual Activity   Drug Use Not Currently    Frequency: 1 0 times per week    Types: Marijuana     Social History     Tobacco Use   Smoking Status Former Smoker    Packs/day: 1 00    Years: 18 00    Pack years: 18 00    Types: Cigarettes    Start date:     Quit date:     Years since quittin 3   Smokeless Tobacco Former User   Tobacco Comment    quite smoking        FAMILY HISTORY:  Family History   Adopted: Yes       MEDICATIONS:    Current Facility-Administered Medications:     acetaminophen (TYLENOL) tablet 650 mg, 650 mg, Oral, Q6H PRN, Shital Gutierrez PA-C    albuterol (PROVENTIL HFA,VENTOLIN HFA) inhaler 2 puff, 2 puff, Inhalation, Q6H PRN, Shital Alfonsoo, PA-C    aluminum-magnesium hydroxide-simethicone (MYLANTA) oral suspension 30 mL, 30 mL, Oral, Q6H PRN, Shital Gutierrez PA-C    aspirin chewable tablet 81 mg, 81 mg, Oral, Daily, Shitaltremaine Gutierrez PA-C, 81 mg at 22 0802    atorvastatin (LIPITOR) tablet 20 mg, 20 mg, Oral, After Dinner, Shital Gutierrez PA-C    carvedilol (COREG) tablet 3 125 mg, 3 125 mg, Oral, BID With Meals, Shitaltremaine Gutierrez PA-C, 3 125 mg at 22 0748    dextromethorphan-guaiFENesin (ROBITUSSIN DM) oral syrup 10 mL, 10 mL, Oral, Q4H PRN, Shital Gutierrez PA-C, 10 mL at 22 0750    guaiFENesin (MUCINEX) 12 hr tablet 600 mg, 600 mg, Oral, Q12H [address], Shitaltremaine Alfonsoo, PA-C, 600 mg at 22 0802    heparin (porcine) 25,000 units in 0 45% NaCl 250 mL infusion (premix), 3-30 Units/kg/hr (Order-Specific), Intravenous, Titrated, LESTER Armenta-PAUL, Last Rate: 18 9 mL/hr at 22 0812, 18 Units/kg/hr at 22 08    heparin (porcine) injection 4,200 Units, 4,200 Units, Intravenous, Q1H PRN, Jevon Dewey PA-C    heparin (porcine) injection 8,400 Units, 8,400 Units, Intravenous, Q1H PRN, Shital Gutierrez PA-C    losartan (COZAAR) tablet 12 5 mg, 12 5 mg, Oral, HS, Shital Gutierrez PA-C    ondansetron (ZOFRAN) injection 4 mg, 4 mg, Intravenous, Q6H PRN, Shital Gutierrez PA-C    sodium chloride (OCEAN) 0 65 % nasal spray 1 spray, 1 spray, Each Nare, Q1H PRN, Jevon Dewey PA-C    Current Outpatient Medications:     Adhesive Bandages MISC, by Does not apply route 3 (three) times a day, Disp: 30 each, Rfl: 5    albuterol (PROVENTIL HFA,VENTOLIN HFA) 90 mcg/act inhaler, Inhale 2 puffs every 6 (six) hours as needed, Disp: , Rfl:     Alcohol Swabs (ALCOHOL PREP) PADS, by Does not apply route 3 (three) times a day, Disp: 100 each, Rfl: 2    aspirin 81 MG tablet, Take 81 mg by mouth daily, Disp: , Rfl:     atorvastatin (LIPITOR) 20 mg tablet, take 1 tablet by mouth once daily, Disp: 30 tablet, Rfl: 2    Blood Glucose Monitoring Suppl KIT, by Does not apply route 3 (three) times a day ONE TOUCH DEVISE TEST BLOOD SUGARS 3 TIMES DAILY, Disp: 1 each, Rfl: 0    carvedilol (Coreg) 3 125 mg tablet, Take 1 tablet (3 125 mg total) by mouth 2 (two) times a day with meals, Disp: 180 tablet, Rfl: 0    glucose blood test strip, Tests tid, Disp: 100 each, Rfl: 2    Lancets (ONETOUCH ULTRASOFT) lancets, Test tid, Disp: 100 each, Rfl: 2    lidocaine-prilocaine (EMLA) cream, APPLY 1/2 HOUR PRIOR TO DIALYSIS AS DIRECTED, Disp: , Rfl:     losartan (COZAAR) 25 mg tablet, Take 0 5 tablets (12 5 mg total) by mouth daily at bedtime, Disp: 30 tablet, Rfl: 0    REVIEW OF SYSTEMS:  A complete review of systems was performed and found to be negative unless otherwise noted in the history of present illness  General: No fevers, chills  Cardiovascular:  - chest pain, - leg edema  Respiratory: No cough, sputum production,  + shortness of breath  Gastrointestinal:  - nausea/vomiting,  - diarrhea,  - abdominal pain  Genitourinary: No hematuria  No foamy urine    No dysuria    PHYSICAL EXAM:  Current Weight:    First Weight:    Vitals:    05/18/22 0158 05/18/22 0524 05/18/22 0642 05/18/22 0748   BP: 138/73 119/63 134/72    BP Location: Right arm Right arm Right arm    Pulse: 70 65 63 80   Resp: 20 20 18    Temp: 99 2 °F (37 3 °C)      TempSrc: Oral      SpO2: 100% 100% 97%      No intake or output data in the 24 hours ending 05/18/22 0931  General: NAD  Skin: warm, dry, intact, no rash  HEENT: Moist mucous membranes, sclera anicteric, normocephalic, atraumatic  Neck: No apparent JVD appreciated  Chest: lung sounds clear B/L, on RA, perm cath    CVS:Regular rate and rhythm, no murmer   Abdomen: Soft, round, non-tender, +BS  Extremities: No B/L LE edema present, LUE AVG  Neuro: alert and oriented  Psych: appropriate mood and affect     Invasive Devices:      Lab Results:   Results from last 7 days   Lab Units 05/18/22  0233   WBC Thousand/uL 7 71   HEMOGLOBIN g/dL 12 1   HEMATOCRIT % 35 8*   PLATELETS Thousands/uL 163   SODIUM mmol/L 136   POTASSIUM mmol/L 5 2   CHLORIDE mmol/L 103   CO2 mmol/L 18*   BUN mg/dL 67*   CREATININE mg/dL 14 53*   CALCIUM mg/dL 9 2   ALK PHOS U/L 41*   ALT U/L 21   AST U/L 26

## 2022-05-18 NOTE — ASSESSMENT & PLAN NOTE
· COVID positive on 05/16/2022  Patient has received 2 doses of vaccine    No booster  · Will continue treatment with remdesivir  · Steroids were not initiated as patient is not requiring oxygen  · Patient gradually improving  · Will monitor inflammatory markers  · Heparin subcu for anticoagulation  · Supportive care

## 2022-05-18 NOTE — ED NOTES
Patient in restroom will ring when he is out so can be transported to floor     [facility]  05/18/22 1047

## 2022-05-19 ENCOUNTER — APPOINTMENT (INPATIENT)
Dept: NON INVASIVE DIAGNOSTICS | Facility: HOSPITAL | Age: 57
DRG: 137 | End: 2022-05-19
Payer: COMMERCIAL

## 2022-05-19 LAB
ANION GAP SERPL CALCULATED.3IONS-SCNC: 15 MMOL/L (ref 4–13)
AORTIC ROOT: 4.2 CM
AORTIC VALVE MEAN VELOCITY: 7.8 M/S
APICAL FOUR CHAMBER EJECTION FRACTION: 19 %
ASCENDING AORTA: 3.5 CM
AV AREA BY CONTINUOUS VTI: 3.5 CM2
AV AREA PEAK VELOCITY: 3.4 CM2
AV LVOT MEAN GRADIENT: 1 MMHG
AV LVOT PEAK GRADIENT: 2 MMHG
AV MEAN GRADIENT: 3 MMHG
AV PEAK GRADIENT: 4 MMHG
AV VALVE AREA: 3.47 CM2
AV VELOCITY RATIO: 0.68
BUN SERPL-MCNC: 43 MG/DL (ref 5–25)
CALCIUM SERPL-MCNC: 8.3 MG/DL (ref 8.3–10.1)
CHLORIDE SERPL-SCNC: 100 MMOL/L (ref 100–108)
CO2 SERPL-SCNC: 21 MMOL/L (ref 21–32)
CREAT SERPL-MCNC: 10.18 MG/DL (ref 0.6–1.3)
CRP SERPL QL: 131.4 MG/L
D DIMER PPP FEU-MCNC: 2.18 UG/ML FEU
DOP CALC AO PEAK VEL: 1.04 M/S
DOP CALC AO VTI: 20.29 CM
DOP CALC LVOT AREA: 4.91 CM2
DOP CALC LVOT DIAMETER: 2.5 CM
DOP CALC LVOT PEAK VEL VTI: 14.34 CM
DOP CALC LVOT PEAK VEL: 0.71 M/S
DOP CALC LVOT STROKE INDEX: 31.7 ML/M2
DOP CALC LVOT STROKE VOLUME: 70.36
E WAVE DECELERATION TIME: 188 MS
ERYTHROCYTE [DISTWIDTH] IN BLOOD BY AUTOMATED COUNT: 12.8 % (ref 11.6–15.1)
FRACTIONAL SHORTENING: 21 (ref 28–44)
GFR SERPL CREATININE-BSD FRML MDRD: 5 ML/MIN/1.73SQ M
GLUCOSE SERPL-MCNC: 137 MG/DL (ref 65–140)
HCT VFR BLD AUTO: 29.9 % (ref 36.5–49.3)
HGB BLD-MCNC: 10.4 G/DL (ref 12–17)
INTERVENTRICULAR SEPTUM IN DIASTOLE (PARASTERNAL SHORT AXIS VIEW): 1.5 CM
INTERVENTRICULAR SEPTUM: 1.5 CM (ref 0.6–1.1)
LAAS-AP2: 22.8 CM2
LAAS-AP4: 24.6 CM2
LEFT ATRIUM SIZE: 3.6 CM
LEFT INTERNAL DIMENSION IN SYSTOLE: 3.8 CM (ref 2.1–4)
LEFT VENTRICULAR INTERNAL DIMENSION IN DIASTOLE: 4.8 CM (ref 3.5–6)
LEFT VENTRICULAR POSTERIOR WALL IN END DIASTOLE: 1.1 CM
LEFT VENTRICULAR STROKE VOLUME: 45 ML
LVSV (TEICH): 45 ML
MAGNESIUM SERPL-MCNC: 1.9 MG/DL (ref 1.6–2.6)
MCH RBC QN AUTO: 36.1 PG (ref 26.8–34.3)
MCHC RBC AUTO-ENTMCNC: 34.8 G/DL (ref 31.4–37.4)
MCV RBC AUTO: 104 FL (ref 82–98)
MV E'TISSUE VEL-LAT: 5 CM/S
MV E'TISSUE VEL-SEP: 5 CM/S
MV PEAK A VEL: 0.59 M/S
MV PEAK E VEL: 43 CM/S
PHOSPHATE SERPL-MCNC: 3.5 MG/DL (ref 2.7–4.5)
PLATELET # BLD AUTO: 118 THOUSANDS/UL (ref 149–390)
PMV BLD AUTO: 11.7 FL (ref 8.9–12.7)
POTASSIUM SERPL-SCNC: 4 MMOL/L (ref 3.5–5.3)
RBC # BLD AUTO: 2.88 MILLION/UL (ref 3.88–5.62)
RIGHT ATRIAL 2D VOLUME: 60 ML
RIGHT ATRIUM AREA SYSTOLE A4C: 19.2 CM2
RIGHT VENTRICLE ID DIMENSION: 4.6 CM
SL CV LEFT ATRIUM LENGTH A2C: 5.1 CM
SL CV LV EF: 28
SL CV PED ECHO LEFT VENTRICLE DIASTOLIC VOLUME (MOD BIPLANE) 2D: 108 ML
SL CV PED ECHO LEFT VENTRICLE SYSTOLIC VOLUME (MOD BIPLANE) 2D: 63 ML
SODIUM SERPL-SCNC: 136 MMOL/L (ref 136–145)
TR MAX PG: 38 MMHG
TR PEAK VELOCITY: 3.1 M/S
TRICUSPID VALVE PEAK REGURGITATION VELOCITY: 3.09 M/S
WBC # BLD AUTO: 4.89 THOUSAND/UL (ref 4.31–10.16)

## 2022-05-19 PROCEDURE — 99232 SBSQ HOSP IP/OBS MODERATE 35: CPT | Performed by: INTERNAL MEDICINE

## 2022-05-19 PROCEDURE — 83735 ASSAY OF MAGNESIUM: CPT | Performed by: PHYSICIAN ASSISTANT

## 2022-05-19 PROCEDURE — 93306 TTE W/DOPPLER COMPLETE: CPT

## 2022-05-19 PROCEDURE — 86140 C-REACTIVE PROTEIN: CPT | Performed by: PHYSICIAN ASSISTANT

## 2022-05-19 PROCEDURE — 93306 TTE W/DOPPLER COMPLETE: CPT | Performed by: INTERNAL MEDICINE

## 2022-05-19 PROCEDURE — 80048 BASIC METABOLIC PNL TOTAL CA: CPT | Performed by: PHYSICIAN ASSISTANT

## 2022-05-19 PROCEDURE — 84100 ASSAY OF PHOSPHORUS: CPT | Performed by: PHYSICIAN ASSISTANT

## 2022-05-19 PROCEDURE — 85379 FIBRIN DEGRADATION QUANT: CPT | Performed by: PHYSICIAN ASSISTANT

## 2022-05-19 PROCEDURE — 85027 COMPLETE CBC AUTOMATED: CPT | Performed by: PHYSICIAN ASSISTANT

## 2022-05-19 RX ORDER — CARVEDILOL 3.12 MG/1
3.12 TABLET ORAL 2 TIMES DAILY WITH MEALS
Status: DISCONTINUED | OUTPATIENT
Start: 2022-05-19 | End: 2022-05-20 | Stop reason: HOSPADM

## 2022-05-19 RX ORDER — LOSARTAN POTASSIUM 25 MG/1
12.5 TABLET ORAL
Status: DISCONTINUED | OUTPATIENT
Start: 2022-05-19 | End: 2022-05-20

## 2022-05-19 RX ADMIN — ASPIRIN 81 MG CHEWABLE TABLET 81 MG: 81 TABLET CHEWABLE at 08:11

## 2022-05-19 RX ADMIN — ATORVASTATIN CALCIUM 20 MG: 20 TABLET, FILM COATED ORAL at 17:14

## 2022-05-19 RX ADMIN — GUAIFENESIN 600 MG: 600 TABLET, EXTENDED RELEASE ORAL at 08:11

## 2022-05-19 RX ADMIN — GUAIFENESIN 600 MG: 600 TABLET, EXTENDED RELEASE ORAL at 21:57

## 2022-05-19 RX ADMIN — REMDESIVIR 100 MG: 100 INJECTION, POWDER, LYOPHILIZED, FOR SOLUTION INTRAVENOUS at 12:30

## 2022-05-19 RX ADMIN — HEPARIN SODIUM 5000 UNITS: 5000 INJECTION INTRAVENOUS; SUBCUTANEOUS at 05:11

## 2022-05-19 RX ADMIN — HEPARIN SODIUM 5000 UNITS: 5000 INJECTION INTRAVENOUS; SUBCUTANEOUS at 13:04

## 2022-05-19 NOTE — UTILIZATION REVIEW
Initial Clinical Review    Admission: Date/Time/Statement:   Admission Orders (From admission, onward)     Ordered        05/18/22 0528  INPATIENT ADMISSION  Once                      Orders Placed This Encounter   Procedures    INPATIENT ADMISSION     Standing Status:   Standing     Number of Occurrences:   1     Order Specific Question:   Level of Care     Answer:   Med Surg [16]     Order Specific Question:   Estimated length of stay     Answer:   More than 2 Midnights     Order Specific Question:   Certification     Answer:   I certify that inpatient services are medically necessary for this patient for a duration of greater than two midnights  See H&P and MD Progress Notes for additional information about the patient's course of treatment  ED Arrival Information     Expected   -    Arrival   5/18/2022 01:54    Acuity   Urgent            Means of arrival   Ambulance    Escorted by   [facility] Hospital[facility]    Admission type   Urgent            Arrival complaint   sob           Chief Complaint   Patient presents with    Shortness of Breath     Pt reports sob for 3 days, productive cough  Dialysis on Mon, wed, Friday  Pt also c/o right leg pain  Initial Presentation: 61 y o  male    Mr Da Bartlett is a 61 yom who presents to the ED via EMS from home with c/o dyspnea x 3 days, RLE tenderness and painful from recent procedure  Recent d/c from [facility] and pt notes he felt winded with ambulation then  PMH: ESRD on HD, secondary hyperparathyroidism and mineral bone disease from CKD , HTN, ischemic CM, CHF, CAD, HLD, dCHF with ICD  He  found to be covid + on 5/16, he is vaxxed x 2, not boosted  Has elevated troponin, D dimer, CRP  In the ED was treated with ASA, nebs, Mucinex, Robitussin, heparin drip  Elevated proBNP, creat 14 5  He is admitted to INPATIENT status with covid infection  - off oxygen, started on IV Remdesivir, trend inflammatory markers   Pain and swelling RLE  - doppler pending, Heparin drip  Pneumonia d/t Covid - monitor off oxygen  ESRD on HD MWF - nephrology consult  Ischemic dilated CM - Tele, Echo  Elevated troponin - could be d/t ESRD or Covid  5/18 Nephrology Consult - ESRD on HD MWF through [facility] - site intact  Last tx Monday 5/16 - above estimated dry weight  Will have HD 5/17  Continue Ultrafiltration, monitor labs, renal diet, will get VDE to r/o DVT  Date: 5/19   Day 2:   Per Nephrology - feeling improved today  Will have to changed to TTS tx d/t covid + status  Pt is not happy about this but is temporary x 2 weeks and will agree  Continue Remdesivir  No need for steroids  BP lower but asymptomatic  HD 5/19        ED Triage Vitals   Temperature Pulse Respirations Blood Pressure SpO2   05/18/22 0158 05/18/22 0158 05/18/22 0158 05/18/22 0158 05/18/22 0158   99 2 °F (37 3 °C) 70 20 138/73 100 %      Temp Source Heart Rate Source Patient Position - Orthostatic VS BP Location FiO2 (%)   05/18/22 0158 05/18/22 0158 05/18/22 1525 05/18/22 0158 --   Oral Monitor Lying Right arm       Pain Score       05/18/22 1133       No Pain          Wt Readings from Last 1 Encounters:   05/19/22 101 kg (222 lb 3 6 oz)     Additional Vital Signs:   05/19/22 08:18:18 98 9 °F (37 2 °C) 74 17 90/51 64 98 % None (Room air) --   05/19/22 0100 -- -- -- -- -- -- None (Room air) --   05/18/22 22:37:27 -- -- -- 122/59 80 -- -- --   05/18/22 1830 99 2 °F (37 3 °C) 68 18 132/64 87 97 % -- Lying   05/18/22 1800 -- 71 18 115/61 79 96 % None (Room air) --   05/18/22 1730 -- 70 18 117/58 78 -- -- --   05/18/22 1700 -- 57 18 113/59 77 -- -- --   05/18/22 1630 -- 60 18 118/61 80 -- -- --   05/18/22 1600 -- 63 18 121/62 82 98 % -- --   05/18/22 1540 -- 62 20 121/64 83 98 % -- --   05/18/22 1525 99 4 °F (37 4 °C) 62 20 107/57 74 98 % -- Lying   05/18/22 15:03:28 -- 60 -- 109/55 73 98 % -- --   05/18/22 1300 -- -- -- -- -- 98 % None (Room air) --   05/18/22 11:33:19 100 1 °F (37 8 °C) 68 17 111/56 74 95 % -- --   05/18/22 1037 -- 77 18 132/76 -- 98 % -- --   05/18/22 0748 -- 80 -- -- -- -- -- --   05/18/22 0642 -- 63 18 134/72 -- 97 % None (Room air) --   05/18/22 0524 -- 65 20 119/63 -- 100 % None (Room air) --     Pertinent Labs/Diagnostic Test Results:     5/18 ECG - NSR  5/18 ECG - SR with PACs  5/18 Echo -   Left Ventricle: Left ventricular cavity size is normal  Wall thickness is mildly increased  The left ventricular ejection fraction is 28%  Systolic function is severely reduced  Diastolic function is mildly abnormal, consistent with grade I (abnormal) relaxation    The following segments are akinetic: basal inferior, basal inferolateral, mid anteroseptal, mid inferior, mid inferolateral, apical septal and apical inferior    The following segments are hypokinetic: mid inferoseptal     Left Atrium: The atrium is mildly dilated    Right Atrium: The atrium is mildly dilated    Mitral Valve: There is mild regurgitation    Pericardium: There is a possible small left pleural effusion    There are no prior studies available for comparison      VAS lower limb venous duplex study, unilateral/limited   Final Result by Chele Vyas MD (05/18 1631)      XR chest 1 view portable      Enlargement of cardiac silhouette  No acute pulmonary disease         Results from last 7 days   Lab Units 05/19/22  0525 05/18/22  0233   WBC Thousand/uL 4 89 7 71   HEMOGLOBIN g/dL 10 4* 12 1   HEMATOCRIT % 29 9* 35 8*   PLATELETS Thousands/uL 118* 163   NEUTROS ABS Thousands/µL  --  3 86      05/16/22 00:00   NOVEL CORONAVIRUS (COVID-19), PCR [facility] detected   !: Data is abnormal        Results from last 7 days   Lab Units 05/19/22  0525 05/18/22  0233   SODIUM mmol/L 136 136   POTASSIUM mmol/L 4 0 5 2   CHLORIDE mmol/L 100 103   CO2 mmol/L 21 18*   ANION GAP mmol/L 15* 15*   BUN mg/dL 43* 67*   CREATININE mg/dL 10 18* 14 53*   EGFR ml/min/1 73sq m 5 3   CALCIUM mg/dL 8 3 9 2   MAGNESIUM mg/dL 1 9  -- PHOSPHORUS mg/dL 3 5  --      Results from last 7 days   Lab Units 05/18/22  0233   AST U/L 26   ALT U/L 21   ALK PHOS U/L 41*   TOTAL PROTEIN g/dL 8 8*   ALBUMIN g/dL 3 8   TOTAL BILIRUBIN mg/dL 0 53         Results from last 7 days   Lab Units 05/19/22  0525 05/18/22  0233   GLUCOSE RANDOM mg/dL 137 107     Results from last 7 days   Lab Units 05/18/22  0435 05/18/22  0233   HS TNI 0HR ng/L  --  417*   HS TNI 2HR ng/L 294*  --    HSTNI D2 ng/L -123  --      Results from last 7 days   Lab Units 05/19/22  0627   D-DIMER QUANTITATIVE ug/ml FEU 2 18*     Results from last 7 days   Lab Units 05/18/22  0633   PTT seconds 31     Results from last 7 days   Lab Units 05/18/22  0949   LACTIC ACID mmol/L 1 2     Results from last 7 days   Lab Units 05/18/22  0233   NT-PRO BNP pg/mL 66,220*     Results from last 7 days   Lab Units 05/19/22  0525   CRP mg/L 131 4*     ED Treatment:   Medication Administration from 05/18/2022 0154 to 05/18/2022 1112    Date/Time Order Dose Route Action   05/18/2022 0351 aspirin tablet 325 mg 325 mg Oral Given   05/18/2022 0956 albuterol (PROVENTIL HFA,VENTOLIN HFA) inhaler 2 puff 2 puff Inhalation Given   05/18/2022 0802 aspirin chewable tablet 81 mg 81 mg Oral Given   05/18/2022 0748 carvedilol (COREG) tablet 3 125 mg 3 125 mg Oral Given   05/18/2022 0802 guaiFENesin (MUCINEX) 12 hr tablet 600 mg 600 mg Oral Given   05/18/2022 0750 dextromethorphan-guaiFENesin (ROBITUSSIN DM) oral syrup 10 mL 10 mL Oral Given   05/18/2022 0754 heparin (porcine) injection 8,400 Units 8,400 Units Intravenous Given   05/18/2022 0812 heparin (porcine) 25,000 units in 0 45% NaCl 250 mL infusion (premix) 18 Units/kg/hr Intravenous New Bag        Past Medical History:   Diagnosis Date    Acute osteomyelitis of metatarsal bone of left foot ([address] ) 6/13/2016    Anemia     BMI 37 0-37 9, adult 10/9/2018    CAD in native artery     Cellulitis of foot, left 2/2/2016    Chronic kidney disease     Depression     Diabetes mellitus ([place] [address] )     History of implantable cardioverter-defibrillator (ICD) placement     [employer]    Hypertension     Myocardial infarct, old 2006    in setting of cocaine use   Obesity     Peripheral neuropathy     Severe obesity (BMI 35 0-39  9) with comorbidity ([place] [address] ) 8/30/2016    Stage 4 chronic kidney disease ([place] [address] ) 12/13/2018    Toe osteomyelitis, right ([place] [address] ) 1/17/2018    Added automatically from request for surgery [id number]    Ventricular tachycardia ([place] [address] )      Present on Admission:   Hypertension   CAD in native artery   Dyslipidemia, goal LDL below 70   Ischemic dilated cardiomyopathy ([place] [address] )   ESRD (end stage renal disease) ([place] [address] )   Pneumonia due to COVID-19 virus   Elevated troponin   Chronic systolic CHF (congestive heart failure) ([facility])      Admitting Diagnosis: SOB (shortness of breath) [R06 02]  ESRD (end stage renal disease) ([place] [address] ) [N18 6]  Elevated troponin [R77 8]  CHF exacerbation ([facility]) [I50 9]  COVID-19 [U07 1]  Age/Sex: 61 y o  male  Admission Orders:  Scheduled Medications:  aspirin, 81 mg, Oral, Daily  atorvastatin, 20 mg, Oral, After Dinner  carvedilol, 3 125 mg, Oral, BID With Meals  doxercalciferol, 3 mcg, Intravenous, Once per day on Mon Wed Fri  guaiFENesin, 600 mg, Oral, Q12H [facility]  heparin (porcine), 5,000 Units, Subcutaneous, Q8H [facility]  losartan, 12 5 mg, Oral, HS  remdesivir, 100 mg, Intravenous, Q24H      Continuous IV Infusions:    Heparin drip - d/c on 5/18 @ 1341     PRN Meds:  acetaminophen, 650 mg, Oral, Q6H PRN  albuterol, 2 puff, Inhalation, Q6H PRN -x 1 5/18  aluminum-magnesium hydroxide-simethicone, 30 mL, Oral, Q6H PRN  chlorproMAZINE, 25 mg, Oral, Q6H PRN -x 1 5/18  dextromethorphan-guaiFENesin, 10 mL, Oral, Q4H PRN -x 3 5/18  lidocaine, 5 mL, Topical, Daily PRN  ondansetron, 4 mg, Intravenous, Q6H PRN  sodium chloride, 1 spray, Each Nare, Q1H PRN    Isolation   HD MWF changing to TTS  Tele  Heparin drip - now d/c   IP CONSULT TO NEPHROLOGY    Network Utilization Review Department  ATTENTION: Please call with any questions or concerns to [phone] and carefully listen to the prompts so that you are directed to the right person  All voicemails are confidential   Missael Parker all requests for admission clinical reviews, approved or denied determinations and any other requests to dedicated fax number below belonging to the campus where the patient is receiving treatment   List of dedicated fax numbers for the Facilities:  [address] DENIALS (Administrative/Medical Necessity) [phone]   [address] (Maternity/NICU/Pediatrics) [phone]   [address]  [address] Medical [place] [address] [address] [place] [address] [address] Jayme [address] [place] [address] [phone]

## 2022-05-19 NOTE — PROGRESS NOTES
NEPHROLOGY PROGRESS NOTE   Awa Alarcon  61 y o  male MRN: [id number]  Unit/Bed#: [place] [address] -01 Encounter: [id number]  Reason for Consult: ESRD on HD    ASSESSMENT/PLAN:  ESRD on HD:  Hemodialysis dependent x2 years  Following with [facility] Transplant Team   -receiving maintenance dialysis on Monday Wednesday Friday schedule at [facility] in [place]   -will need to follow TTS schedule at [facility] due to [place] before returning to home unit  -EDW:  106 5 kg  Last post HD weight 102 2 kg      Access:  PermCath, site intact  -left upper extremity AVG, recent admission on 05/12 at [facility] with occluded fistula, underwent AVG placement and ligation of left upper extremity fistula      Blood pressure:  low at times   -continue UF with HD as BP tolerates  -OP medications:  Coreg 3 125 mg 2 times per day, losartan 25 mg every evening      Anemia in CKD:   -Hgb currently 10 4   -FRED:  None, hemoglobin at goal   -goal Hgb 10-12 g/dL  -continue to monitor and transfuse as needed for Hgb <7 0       MBD in CKD:   -continue to monitor PTH, phos, and Mg as OP    -Mg and phos level within normal limits  -recommend renal diet    -continue Hectorol 3 mcg with dialysis and renal vitamin      Volume status:  Most recent echo per Care everywhere with EF of 25-30% and grade 2 diastolic dysfunction   -chest x-ray negative for acute cardiopulmonary disease   -echocardiogram pending  -BNP greater than 66,000  -continue UF as BP allows  -recommend fluid restriction       Electrolytes:   -borderline elevated potassium  Will continue to monitor and trend with hemodialysis  Recommend renal diet   -anion gap acidosis, will continue to monitor and trend with HD  Lactic acid level normal   -continue to monitor and trend with HD       COVID-19:  Positive on 05/16/2022  Further treatment per primary care team   -BNP elevated     -D-dimer elevated      Right lower extremity pain:  -duplex negative for evidence of DVT  Disposition:  Okay to discharge from Renal once medically cleared  SUBJECTIVE:  The patient is resting in his bed  He denies any chest discomfort shortness of breath  He reports feeling better  He is requesting to stay to tomorrow for his dialysis  We discussed that he will be following on Tuesday Thursday Saturday schedule due to his COVID  The patient reports that he refuses to go to the [facility] for his dialysis treatment  We discussed that we need a safe plan for him to receive his dialysis treatments  He tells me that he does not want to do dialysis on Saturdays  We discussed that this was temporary for 2 weeks  Again the patient is referred this      OBJECTIVE:  Current Weight: Weight - Scale: 101 kg (222 lb 3 6 oz)  Vitals:    05/18/22 1830 05/18/22 2237 05/19/22 0540 05/19/22 0818   BP: 132/64 122/59  90/51   BP Location: Right arm      Pulse: 68   74   Resp: 18   17   Temp: 99 2 °F (37 3 °C)   98 9 °F (37 2 °C)   TempSrc: Oral   Oral   SpO2: 97%   98%   Weight:   101 kg (222 lb 3 6 oz)        Intake/Output Summary (Last 24 hours) at 5/19/2022 1030  Last data filed at 5/18/2022 1825  Gross per 24 hour   Intake 560 48 ml   Output 1500 ml   Net -939 52 ml     General: NAD  Skin: warm, dry, intact, no rash  HEENT: Moist mucous membranes, sclera anicteric, normocephalic, atraumatic  Neck: No apparent JVD appreciated  Chest: lung sounds decreased B/L, on RA, PermCath   CVS:Regular rate and rhythm, no murmer   Abdomen: Soft, round, non-tender, +BS  Extremities: No B/L LE edema present, left upper extremity AVG  Neuro: alert and oriented  Psych: appropriate mood and affect     Medications:    Current Facility-Administered Medications:     acetaminophen (TYLENOL) tablet 650 mg, 650 mg, Oral, Q6H PRN, Shital Gutierrez PA-C    albuterol (PROVENTIL HFA,VENTOLIN HFA) inhaler 2 puff, 2 puff, Inhalation, Q6H PRN, Shital Gutierrez PA-C, 2 puff at 05/18/22 0956    aluminum-magnesium hydroxide-simethicone (MYLANTA) oral suspension 30 mL, 30 mL, Oral, Q6H PRN, Shital Gutierrez PA-C    aspirin chewable tablet 81 mg, 81 mg, Oral, Daily, LESTER Armenta-PAUL, 81 mg at 05/19/22 4062    atorvastatin (LIPITOR) tablet 20 mg, 20 mg, Oral, After Juan R Tyson, PA-C, 20 mg at 05/18/22 1812    carvedilol (COREG) tablet 3 125 mg, 3 125 mg, Oral, BID With Meals, LESTER Armenta-C, 3 125 mg at 05/18/22 1806    chlorproMAZINE (THORAZINE) tablet 25 mg, 25 mg, Oral, Q6H PRN, LESTER Bowens-PAUL, 25 mg at 05/18/22 2229    dextromethorphan-guaiFENesin (ROBITUSSIN DM) oral syrup 10 mL, 10 mL, Oral, Q4H PRN, Shital Gutierrez PA-C, 10 mL at 05/18/22 2017    doxercalciferol (HECTOROL) injection 3 mcg, 3 mcg, Intravenous, Once per day on Mon Wed Fri, MIGUEL Waddell, 3 mcg at 05/18/22 1609    guaiFENesin (MUCINEX) 12 hr tablet 600 mg, 600 mg, Oral, Q12H [address], LESTER Armenta-C, 600 mg at 05/19/22 0811    heparin (porcine) subcutaneous injection 5,000 Units, 5,000 Units, Subcutaneous, Q8H [address], Tim Urrutia MD, 5,000 Units at 05/19/22 0511    lidocaine (XYLOCAINE) 4 % topical solution 5 mL, 5 mL, Topical, Daily PRN, MIGUEL Fraire    losartan (COZAAR) tablet 12 5 mg, 12 5 mg, Oral, HS, Shital Gutierrez PA-C, 12 5 mg at 05/18/22 2100    ondansetron (ZOFRAN) injection 4 mg, 4 mg, Intravenous, Q6H PRN, Shital Gutierrez PA-C    [COMPLETED] remdesivir Darcia Rode) 200 mg in sodium chloride 0 9 % 290 mL IVPB, 200 mg, Intravenous, Q24H, 200 mg at 05/18/22 1435 **FOLLOWED BY** remdesivir (Veklury) 100 mg in sodium chloride 0 9 % 270 mL IVPB, 100 mg, Intravenous, Q24H, Linda Luther MD    sodium chloride (OCEAN) 0 65 % nasal spray 1 spray, 1 spray, Each Nare, Q1H PRN, Yoel Domínguez PA-C    Laboratory Results:  Results from last 7 days   Lab Units 05/19/22  0525 05/18/22  0233   WBC Thousand/uL 4 89 7 71   HEMOGLOBIN g/dL 10 4* 12 1   HEMATOCRIT % 29 9* 35 8*   PLATELETS Thousands/uL 118* 163   SODIUM mmol/L 136 136 POTASSIUM mmol/L 4 0 5 2   CHLORIDE mmol/L 100 103   CO2 mmol/L 21 18*   BUN mg/dL 43* 67*   CREATININE mg/dL 10 18* 14 53*   CALCIUM mg/dL 8 3 9 2   MAGNESIUM mg/dL 1 9  --    PHOSPHORUS mg/dL 3 5  --    ALK PHOS U/L  --  41*   ALT U/L  --  21   AST U/L  --  26

## 2022-05-19 NOTE — PROGRESS NOTES
[facility]  Progress Note - Yocasta Dominguez  1965, 61 y o  male MRN: [id number]  Unit/Bed#: [place] [address] -01 Encounter: [id number]  Primary Care Provider: Kaylen Edwards MD   Date and time admitted to hospital: 5/18/2022  1:54 AM    * Pneumonia due to COVID-19 virus  Assessment & Plan  · COVID positive on 05/16/2022  Patient has received 2 doses of vaccine  No booster  · Will continue treatment with remdesivir  · Steroids were not initiated as patient is not requiring oxygen  · Patient gradually improving  · Will monitor inflammatory markers  · Heparin subcu for anticoagulation  · Supportive care    Pain and swelling of right lower leg  Assessment & Plan  · Doppler negative for DVT  Will continue to monitor  Continue volume management with dialysis    Chronic systolic CHF (congestive heart failure) ([facility])  Assessment & Plan  Wt Readings from Last 3 Encounters:   03/08/22 107 kg (235 lb)   02/08/22 112 kg (247 lb)   11/09/21 112 kg (247 lb 9 6 oz)     · Patient appears to be euvolemic  · Will continue volume management with hemodialysis  · Continue home medications as tolerated    ESRD (end stage renal disease) ([facility] )  Assessment & Plan  · Continue dialysis per Nephrology  · Case Management will need to arrange outpatient dialysis schedule for patient given his COVID positive status    Ischemic dilated cardiomyopathy ([facility] )  Assessment & Plan  · Most recent information on EF is 40% in 2021  · Appears to be stable  · Continue home medications  · Outpatient follow-up with cardiology    Dyslipidemia, goal LDL below 70  Assessment & Plan  · Continue home statin therapy    CAD in native artery  Assessment & Plan  · Continue ASA 81 daily, cardiac catheterization from February 2022 reviewed there is significant multivessel stenosis    · At this time patient is chest pain-free    Hypertension  Assessment & Plan  · BP has been on the lower side  · Continue Coreg and losartan with holding parameters    Elevated troponin  Assessment & Plan  · Troponin 417 initially, trended down  · Initial EKG without ischemic changes  · Likely secondary to COVID infection as well as end-stage renal disease  · Patient denies any chest pain      VTE Prophylaxis:  Heparin    Patient Centered Rounds: I have performed bedside rounds with nursing staff today  Discussions with Specialists or Other Care Team Provider: yes  Education and Discussions with Family / Patient:  Updated patient regarding plan of care    Current Length of Stay: 1 day(s)    Current Patient Status: Inpatient   Certification Statement: The patient will continue to require additional inpatient hospital stay due to COVID infection    Discharge Plan:  Case management to set up outpatient dialysis chair given COVID positive status  Patient stable for discharge home once dialysis chair set up    Code Status: Level 1 - Full Code    Subjective:   No overnight events noted  BP has been on the lower side however patient asymptomatic  Patient getting dialysis tomorrow    Objective:     Vitals:   Temp (24hrs), Av 7 °F (37 1 °C), Min:98 °F (36 7 °C), Max:99 2 °F (37 3 °C)    Temp:  [98 °F (36 7 °C)-99 2 °F (37 3 °C)] 98 °F (36 7 °C)  HR:  [64-74] 64  Resp:  [16-18] 16  BP: ()/(39-64) 90/55  SpO2:  [96 %-98 %] 98 %  Body mass index is 31 85 kg/m²  Input and Output Summary (last 24 hours): Intake/Output Summary (Last 24 hours) at 2022 1746  Last data filed at 2022 1825  Gross per 24 hour   Intake 300 ml   Output 1500 ml   Net -1200 ml       Physical Exam:   Physical Exam  Constitutional:       General: He is not in acute distress  HENT:      Head: Normocephalic and atraumatic  Nose: Nose normal       Mouth/Throat:      Mouth: Mucous membranes are moist    Eyes:      Extraocular Movements: Extraocular movements intact        Conjunctiva/sclera: Conjunctivae normal    Cardiovascular:      Rate and Rhythm: Normal rate and regular rhythm  Pulmonary:      Effort: Pulmonary effort is normal  No respiratory distress  Abdominal:      Palpations: Abdomen is soft  Tenderness: There is no abdominal tenderness  Musculoskeletal:         General: Normal range of motion  Cervical back: Normal range of motion and neck supple  Skin:     General: Skin is warm and dry  Neurological:      General: No focal deficit present  Mental Status: He is alert  Mental status is at baseline  Cranial Nerves: No cranial nerve deficit  Psychiatric:         Mood and Affect: Mood normal          Behavior: Behavior normal          Additional Data:     Labs:    Results from last 7 days   Lab Units 05/19/22  0525 05/18/22  0233   WBC Thousand/uL 4 89 7 71   HEMOGLOBIN g/dL 10 4* 12 1   HEMATOCRIT % 29 9* 35 8*   PLATELETS Thousands/uL 118* 163   NEUTROS PCT %  --  51   LYMPHS PCT %  --  30   MONOS PCT %  --  19*   EOS PCT %  --  0     Results from last 7 days   Lab Units 05/19/22  0525 05/18/22  0233   SODIUM mmol/L 136 136   POTASSIUM mmol/L 4 0 5 2   CHLORIDE mmol/L 100 103   CO2 mmol/L 21 18*   BUN mg/dL 43* 67*   CREATININE mg/dL 10 18* 14 53*   CALCIUM mg/dL 8 3 9 2   ALK PHOS U/L  --  41*   ALT U/L  --  21   AST U/L  --  26                   * I Have Reviewed All Lab Data Listed Above  * Additional Pertinent Lab Tests Reviewed:  Nicole [address] Admission  Reviewed    Imaging:  Imaging Reports Reviewed Today Include:  No new imaging    Recent Cultures (last 7 days):           Last 24 Hours Medication List:   Current Facility-Administered Medications   Medication Dose Route Frequency Provider Last Rate    acetaminophen  650 mg Oral Q6H PRN Shital Gutierrez PA-C      albuterol  2 puff Inhalation Q6H PRN Shital Gutierrez PA-C      aluminum-magnesium hydroxide-simethicone  30 mL Oral Q6H PRN Shital Gutierrez PA-C      aspirin  81 mg Oral Daily Shital Gutierrez PA-C      atorvastatin  20 mg Oral After [place] [facility]      carvedilol 3 125 mg Oral BID With Meals MIGUEL [place]      chlorproMAZINE  25 mg Oral Q6H PRN Meka Cody PA-C      dextromethorphan-guaiFENesin  10 mL Oral Q4H PRN Shital Gutierrez PA-C      doxercalciferol  3 mcg Intravenous Once per day on Mon Wed Fri MIGUEL [place]      guaiFENesin  600 mg Oral Q12H [address] Shital [place], [place]      heparin (porcine)  5,000 Units Subcutaneous [facility] [place], MD      lidocaine  5 mL Topical Daily PRN MIGUEL [place]      losartan  12 5 mg Oral HS MIGUEL [place]      ondansetron  4 mg Intravenous Q6H PRN Shital Gutierrez PA-C      remdesivir  100 mg Intravenous Q24H Mague Schultz MD      sodium chloride  1 spray Each Nare Q1H PRN Kenny Botello PA-C          Today, Patient Was Seen By: Mague Schultz MD    ** Please Note: Dictation voice to text software may have been used in the creation of this document   **

## 2022-05-19 NOTE — PLAN OF CARE
Problem: Potential for Falls  Goal: Patient will remain free of falls  Description: INTERVENTIONS:  - Educate patient/family on patient safety including physical limitations  - Instruct patient to call for assistance with activity   - Consult OT/PT to assist with strengthening/mobility   - Keep Call bell within reach  - Keep bed low and locked with side rails adjusted as appropriate  - Keep care items and personal belongings within reach  - Initiate and maintain comfort rounds  - Make Fall Risk Sign visible to staff  - Offer Toileting every 2 Hours, in advance of need  - Initiate/Maintain   alarm  - Obtain necessary fall risk management equipment:     - Apply yellow socks and bracelet for high fall risk patients  - Consider moving patient to room near nurses station  Outcome: Progressing     Problem: NEUROSENSORY - ADULT  Goal: Achieves stable or improved neurological status  Description: INTERVENTIONS  - Monitor and report changes in neurological status  - Monitor vital signs such as temperature, blood pressure, glucose, and any other labs ordered   - Initiate measures to prevent increased intracranial pressure  - Monitor for seizure activity and implement precautions if appropriate      Outcome: Progressing  Goal: Remains free of injury related to seizures activity  Description: INTERVENTIONS  - Maintain airway, patient safety  and administer oxygen as ordered  - Monitor patient for seizure activity, document and report duration and description of seizure to physician/advanced practitioner  - If seizure occurs,  ensure patient safety during seizure  - Reorient patient post seizure  - Seizure pads on all 4 side rails  - Instruct patient/family to notify RN of any seizure activity including if an aura is experienced  - Instruct patient/family to call for assistance with activity based on nursing assessment  - Administer anti-seizure medications if ordered    Outcome: Progressing  Goal: Achieves maximal functionality and self care  Description: INTERVENTIONS  - Monitor swallowing and airway patency with patient fatigue and changes in neurological status  - Encourage and assist patient to increase activity and self care     - Encourage visually impaired, hearing impaired and aphasic patients to use assistive/communication devices  Outcome: Progressing     Problem: CARDIOVASCULAR - ADULT  Goal: Maintains optimal cardiac output and hemodynamic stability  Description: INTERVENTIONS:  - Monitor I/O, vital signs and rhythm  - Monitor for S/S and trends of decreased cardiac output  - Administer and titrate ordered vasoactive medications to optimize hemodynamic stability  - Assess quality of pulses, skin color and temperature  - Assess for signs of decreased coronary artery perfusion  - Instruct patient to report change in severity of symptoms  Outcome: Progressing  Goal: Absence of cardiac dysrhythmias or at baseline rhythm  Description: INTERVENTIONS:  - Continuous cardiac monitoring, vital signs, obtain 12 lead EKG if ordered  - Administer antiarrhythmic and heart rate control medications as ordered  - Monitor electrolytes and administer replacement therapy as ordered  Outcome: Progressing     Problem: METABOLIC, FLUID AND ELECTROLYTES - ADULT  Goal: Electrolytes maintained within normal limits  Description: INTERVENTIONS:  - Monitor labs and assess patient for signs and symptoms of electrolyte imbalances  - Administer electrolyte replacement as ordered  - Monitor response to electrolyte replacements, including repeat lab results as appropriate  - Instruct patient on fluid and nutrition as appropriate  Outcome: Progressing  Goal: Fluid balance maintained  Description: INTERVENTIONS:  - Monitor labs   - Monitor I/O and WT  - Instruct patient on fluid and nutrition as appropriate  - Assess for signs & symptoms of volume excess or deficit  Outcome: Progressing  Goal: Glucose maintained within target range  Description: INTERVENTIONS:  - Monitor Blood Glucose as ordered  - Assess for signs and symptoms of hyperglycemia and hypoglycemia  - Administer ordered medications to maintain glucose within target range  - Assess nutritional intake and initiate nutrition service referral as needed  Outcome: Progressing

## 2022-05-19 NOTE — PLAN OF CARE
Problem: Potential for Falls  Goal: Patient will remain free of falls  Description: INTERVENTIONS:  - Educate patient/family on patient safety including physical limitations  - Instruct patient to call for assistance with activity   - Consult OT/PT to assist with strengthening/mobility   - Keep Call bell within reach  - Keep bed low and locked with side rails adjusted as appropriate  - Keep care items and personal belongings within reach  - Initiate and maintain comfort rounds  - Make Fall Risk Sign visible to staff  - Offer Toileting every  Hours, in advance of need  - Initiate/Maintain alarm  - Obtain necessary fall risk management equipment:   - Apply yellow socks and bracelet for high fall risk patients  - Consider moving patient to room near nurses station  Outcome: Progressing     Problem: NEUROSENSORY - ADULT  Goal: Achieves stable or improved neurological status  Description: INTERVENTIONS  - Monitor and report changes in neurological status  - Monitor vital signs such as temperature, blood pressure, glucose, and any other labs ordered   - Initiate measures to prevent increased intracranial pressure  - Monitor for seizure activity and implement precautions if appropriate      Outcome: Progressing  Goal: Remains free of injury related to seizures activity  Description: INTERVENTIONS  - Maintain airway, patient safety  and administer oxygen as ordered  - Monitor patient for seizure activity, document and report duration and description of seizure to physician/advanced practitioner  - If seizure occurs,  ensure patient safety during seizure  - Reorient patient post seizure  - Seizure pads on all 4 side rails  - Instruct patient/family to notify RN of any seizure activity including if an aura is experienced  - Instruct patient/family to call for assistance with activity based on nursing assessment  - Administer anti-seizure medications if ordered    Outcome: Progressing  Goal: Achieves maximal functionality and self care  Description: INTERVENTIONS  - Monitor swallowing and airway patency with patient fatigue and changes in neurological status  - Encourage and assist patient to increase activity and self care     - Encourage visually impaired, hearing impaired and aphasic patients to use assistive/communication devices  Outcome: Progressing     Problem: CARDIOVASCULAR - ADULT  Goal: Maintains optimal cardiac output and hemodynamic stability  Description: INTERVENTIONS:  - Monitor I/O, vital signs and rhythm  - Monitor for S/S and trends of decreased cardiac output  - Administer and titrate ordered vasoactive medications to optimize hemodynamic stability  - Assess quality of pulses, skin color and temperature  - Assess for signs of decreased coronary artery perfusion  - Instruct patient to report change in severity of symptoms  Outcome: Progressing  Goal: Absence of cardiac dysrhythmias or at baseline rhythm  Description: INTERVENTIONS:  - Continuous cardiac monitoring, vital signs, obtain 12 lead EKG if ordered  - Administer antiarrhythmic and heart rate control medications as ordered  - Monitor electrolytes and administer replacement therapy as ordered  Outcome: Progressing     Problem: RESPIRATORY - ADULT  Goal: Achieves optimal ventilation and oxygenation  Description: INTERVENTIONS:  - Assess for changes in respiratory status  - Assess for changes in mentation and behavior  - Position to facilitate oxygenation and minimize respiratory effort  - Oxygen administered by appropriate delivery if ordered  - Initiate smoking cessation education as indicated  - Encourage broncho-pulmonary hygiene including cough, deep breathe, Incentive Spirometry  - Assess the need for suctioning and aspirate as needed  - Assess and instruct to report SOB or any respiratory difficulty  - Respiratory Therapy support as indicated  Outcome: Progressing     Problem: GASTROINTESTINAL - ADULT  Goal: Minimal or absence of nausea and/or vomiting  Description: INTERVENTIONS:  - Administer IV fluids if ordered to ensure adequate hydration  - Maintain NPO status until nausea and vomiting are resolved  - Nasogastric tube if ordered  - Administer ordered antiemetic medications as needed  - Provide nonpharmacologic comfort measures as appropriate  - Advance diet as tolerated, if ordered  - Consider nutrition services referral to assist patient with adequate nutrition and appropriate food choices  Outcome: Progressing  Goal: Maintains or returns to baseline bowel function  Description: INTERVENTIONS:  - Assess bowel function  - Encourage oral fluids to ensure adequate hydration  - Administer IV fluids if ordered to ensure adequate hydration  - Administer ordered medications as needed  - Encourage mobilization and activity  - Consider nutritional services referral to assist patient with adequate nutrition and appropriate food choices  Outcome: Progressing  Goal: Maintains adequate nutritional intake  Description: INTERVENTIONS:  - Monitor percentage of each meal consumed  - Identify factors contributing to decreased intake, treat as appropriate  - Assist with meals as needed  - Monitor I&O, weight, and lab values if indicated  - Obtain nutrition services referral as needed  Outcome: Progressing  Goal: Establish and maintain optimal ostomy function  Description: INTERVENTIONS:  - Assess bowel function  - Encourage oral fluids to ensure adequate hydration  - Administer IV fluids if ordered to ensure adequate hydration   - Administer ordered medications as needed  - Encourage mobilization and activity  - Nutrition services referral to assist patient with appropriate food choices  - Assess stoma site  - Consider wound care consult   Outcome: Progressing  Goal: Oral mucous membranes remain intact  Description: INTERVENTIONS  - Assess oral mucosa and hygiene practices  - Implement preventative oral hygiene regimen  - Implement oral medicated treatments as ordered  - Initiate Nutrition services referral as needed  Outcome: Progressing     Problem: GENITOURINARY - ADULT  Goal: Maintains or returns to baseline urinary function  Description: INTERVENTIONS:  - Assess urinary function  - Encourage oral fluids to ensure adequate hydration if ordered  - Administer IV fluids as ordered to ensure adequate hydration  - Administer ordered medications as needed  - Offer frequent toileting  - Follow urinary retention protocol if ordered  Outcome: Progressing  Goal: Absence of urinary retention  Description: INTERVENTIONS:  - Assess patients ability to void and empty bladder  - Monitor I/O  - Bladder scan as needed  - Discuss with physician/AP medications to alleviate retention as needed  - Discuss catheterization for long term situations as appropriate  Outcome: Progressing  Goal: Urinary catheter remains patent  Description: INTERVENTIONS:  - Assess patency of urinary catheter  - If patient has a chronic gomez, consider changing catheter if non-functioning  - Follow guidelines for intermittent irrigation of non-functioning urinary catheter  Outcome: Progressing     Problem: METABOLIC, FLUID AND ELECTROLYTES - ADULT  Goal: Electrolytes maintained within normal limits  Description: INTERVENTIONS:  - Monitor labs and assess patient for signs and symptoms of electrolyte imbalances  - Administer electrolyte replacement as ordered  - Monitor response to electrolyte replacements, including repeat lab results as appropriate  - Instruct patient on fluid and nutrition as appropriate  Outcome: Progressing  Goal: Fluid balance maintained  Description: INTERVENTIONS:  - Monitor labs   - Monitor I/O and WT  - Instruct patient on fluid and nutrition as appropriate  - Assess for signs & symptoms of volume excess or deficit  Outcome: Progressing  Goal: Glucose maintained within target range  Description: INTERVENTIONS:  - Monitor Blood Glucose as ordered  - Assess for signs and symptoms of hyperglycemia and hypoglycemia  - Administer ordered medications to maintain glucose within target range  - Assess nutritional intake and initiate nutrition service referral as needed  Outcome: Progressing     Problem: SKIN/TISSUE INTEGRITY - ADULT  Goal: Skin Integrity remains intact(Skin Breakdown Prevention)  Description: Assess:  -Perform Hemran assessment every   -Clean and moisturize skin every   -Inspect skin when repositioning, toileting, and assisting with ADLS  -Assess under medical devices such as  every   -Assess extremities for adequate circulation and sensation     Bed Management:  -Have minimal linens on bed & keep smooth, unwrinkled  -Change linens as needed when moist or perspiring  -Avoid sitting or lying in one position for more than  hours while in bed  -Keep HOB at degrees     Toileting:  -Offer bedside commode  -Assess for incontinence every   -Use incontinent care products after each incontinent episode such as     Activity:  -Mobilize patient  times a day  -Encourage activity and walks on unit  -Encourage or provide ROM exercises   -Turn and reposition patient every  Hours  -Use appropriate equipment to lift or move patient in bed  -Instruct/ Assist with weight shifting every  when out of bed in chair  -Consider limitation of chair time  hour intervals    Skin Care:  -Avoid use of baby powder, tape, friction and shearing, hot water or constrictive clothing  -Relieve pressure over bony prominences using   -Do not massage red bony areas    Next Steps:  -Teach patient strategies to minimize risks such as    -Consider consults to  interdisciplinary teams such as   Outcome: Progressing  Goal: Incision(s), wounds(s) or drain site(s) healing without S/S of infection  Description: INTERVENTIONS  - Assess and document dressing, incision, wound bed, drain sites and surrounding tissue  - Provide patient and family education  - Perform skin care/dressing changes every   Outcome: Progressing  Goal: Pressure injury heals and does not worsen  Description: Interventions:  - Implement low air loss mattress or specialty surface (Criteria met)  - Apply silicone foam dressing  - Instruct/assist with weight shifting every  minutes when in chair   - Limit chair time to  hour intervals  - Use special pressure reducing interventions such as  when in chair   - Apply fecal or urinary incontinence containment device   - Perform passive or active ROM every   - Turn and reposition patient & offload bony prominences every  hours   - Utilize friction reducing device or surface for transfers   - Consider consults to  interdisciplinary teams such as   - Use incontinent care products after each incontinent episode such as   - Consider nutrition services referral as needed  Outcome: Progressing     Problem: HEMATOLOGIC - ADULT  Goal: Maintains hematologic stability  Description: INTERVENTIONS  - Assess for signs and symptoms of bleeding or hemorrhage  - Monitor labs  - Administer supportive blood products/factors as ordered and appropriate  Outcome: Progressing     Problem: MUSCULOSKELETAL - ADULT  Goal: Maintain or return mobility to safest level of function  Description: INTERVENTIONS:  - Assess patient's ability to carry out ADLs; assess patient's baseline for ADL function and identify physical deficits which impact ability to perform ADLs (bathing, care of mouth/teeth, toileting, grooming, dressing, etc )  - Assess/evaluate cause of self-care deficits   - Assess range of motion  - Assess patient's mobility  - Assess patient's need for assistive devices and provide as appropriate  - Encourage maximum independence but intervene and supervise when necessary  - Involve family in performance of ADLs  - Assess for home care needs following discharge   - Consider OT consult to assist with ADL evaluation and planning for discharge  - Provide patient education as appropriate  Outcome: Progressing  Goal: Maintain proper alignment of affected body part  Description: INTERVENTIONS:  - Support, maintain and protect limb and body alignment  - Provide patient/ family with appropriate education  Outcome: Progressing     Problem: Nutrition/Hydration-ADULT  Goal: Nutrient/Hydration intake appropriate for improving, restoring or maintaining nutritional needs  Description: Monitor and assess patient's nutrition/hydration status for malnutrition  Collaborate with interdisciplinary team and initiate plan and interventions as ordered  Monitor patient's weight and dietary intake as ordered or per policy  Utilize nutrition screening tool and intervene as necessary  Determine patient's food preferences and provide high-protein, high-caloric foods as appropriate       INTERVENTIONS:  - Monitor oral intake, urinary output, labs, and treatment plans  - Assess nutrition and hydration status and recommend course of action  - Evaluate amount of meals eaten  - Assist patient with eating if necessary   - Allow adequate time for meals  - Recommend/ encourage appropriate diets, oral nutritional supplements, and vitamin/mineral supplements  - Order, calculate, and assess calorie counts as needed  - Recommend, monitor, and adjust tube feedings and TPN/PPN based on assessed needs  - Assess need for intravenous fluids  - Provide specific nutrition/hydration education as appropriate  - Include patient/family/caregiver in decisions related to nutrition  Outcome: Progressing     Problem: MOBILITY - ADULT  Goal: Maintain or return to baseline ADL function  Description: INTERVENTIONS:  -  Assess patient's ability to carry out ADLs; assess patient's baseline for ADL function and identify physical deficits which impact ability to perform ADLs (bathing, care of mouth/teeth, toileting, grooming, dressing, etc )  - Assess/evaluate cause of self-care deficits   - Assess range of motion  - Assess patient's mobility; develop plan if impaired  - Assess patient's need for assistive devices and provide as appropriate  - Encourage maximum independence but intervene and supervise when necessary  - Involve family in performance of ADLs  - Assess for home care needs following discharge   - Consider OT consult to assist with ADL evaluation and planning for discharge  - Provide patient education as appropriate  Outcome: Progressing  Goal: Maintains/Returns to pre admission functional level  Description: INTERVENTIONS:  - Perform BMAT or MOVE assessment daily    - Set and communicate daily mobility goal to care team and patient/family/caregiver  - Collaborate with rehabilitation services on mobility goals if consulted  - Perform Range of Mot times a day  - Reposition patient every  hours    - Dangle patient  times a day  - Stand patient  times a day  - Ambulate patient  times a day  - Out of bed to chair  times a day   - Out of bed for meals  times a day  - Out of bed for toileting  - Record patient progress and toleration of activity level   Outcome: Progressing

## 2022-05-19 NOTE — UTILIZATION REVIEW
Inpatient Admission Authorization Request   NOTIFICATION OF INPATIENT ADMISSION/INPATIENT AUTHORIZATION REQUEST   SERVICING FACILITY:   [address] [place] [address], [facility], [facility]  Tax ID: [id number]  NPI: [id number]  Place of Service: Inpatient [address]  [address]  Place of Service Code: 24     ATTENDING PROVIDER:  Attending Name and NPI#: [facility] [[id number][facility]  Address: [address] [place] [address], [facility], [facility]  Phone: [phone]     UTILIZATION REVIEW CONTACT:  Priyanka Pena, Utilization   Network Utilization Review Department  Phone: [phone]  Fax: [phone]  Email: [email or web address]     PHYSICIAN ADVISORY SERVICES:  FOR [id number]-AFYA REVIEW - MEDICAL NECESSITY DENIAL  Phone: [phone]  Fax: [phone]  Email: [email or web address]     TYPE OF REQUEST:  Inpatient Status     ADMISSION INFORMATION:  ADMISSION DATE/TIME: 5/18/22  5:28 AM  PATIENT DIAGNOSIS CODE/DESCRIPTION:  SOB (shortness of breath) [R06 02]  ESRD (end stage renal disease) ([facility] [address] ) [N18 6]  Elevated troponin [R77 8]  CHF exacerbation (HCC) [I50 9]  COVID-19 [U07 1]  DISCHARGE DATE/TIME: No discharge date for patient encounter  IMPORTANT INFORMATION:  Please contact the Priyanka Pena directly with any questions or concerns regarding this request  Department voicemails are confidential     Send requests for admission clinical reviews, concurrent reviews, approvals, and administrative denials due to lack of clinical to fax [phone]

## 2022-05-20 ENCOUNTER — APPOINTMENT (INPATIENT)
Dept: DIALYSIS | Facility: HOSPITAL | Age: 57
DRG: 137 | End: 2022-05-20
Payer: COMMERCIAL

## 2022-05-20 VITALS
WEIGHT: 224.65 LBS | HEIGHT: 70 IN | TEMPERATURE: 98.3 F | HEART RATE: 72 BPM | DIASTOLIC BLOOD PRESSURE: 61 MMHG | OXYGEN SATURATION: 97 % | BODY MASS INDEX: 32.16 KG/M2 | RESPIRATION RATE: 15 BRPM | SYSTOLIC BLOOD PRESSURE: 131 MMHG

## 2022-05-20 LAB
ANION GAP SERPL CALCULATED.3IONS-SCNC: 15 MMOL/L (ref 4–13)
BUN SERPL-MCNC: 66 MG/DL (ref 5–25)
CALCIUM SERPL-MCNC: 7.8 MG/DL (ref 8.3–10.1)
CHLORIDE SERPL-SCNC: 97 MMOL/L (ref 100–108)
CO2 SERPL-SCNC: 22 MMOL/L (ref 21–32)
CREAT SERPL-MCNC: 12.82 MG/DL (ref 0.6–1.3)
ERYTHROCYTE [DISTWIDTH] IN BLOOD BY AUTOMATED COUNT: 12.7 % (ref 11.6–15.1)
GFR SERPL CREATININE-BSD FRML MDRD: 3 ML/MIN/1.73SQ M
GLUCOSE SERPL-MCNC: 147 MG/DL (ref 65–140)
HCT VFR BLD AUTO: 28.4 % (ref 36.5–49.3)
HGB BLD-MCNC: 10 G/DL (ref 12–17)
MCH RBC QN AUTO: 36.1 PG (ref 26.8–34.3)
MCHC RBC AUTO-ENTMCNC: 35.2 G/DL (ref 31.4–37.4)
MCV RBC AUTO: 103 FL (ref 82–98)
PLATELET # BLD AUTO: 144 THOUSANDS/UL (ref 149–390)
PMV BLD AUTO: 10.8 FL (ref 8.9–12.7)
POTASSIUM SERPL-SCNC: 4.3 MMOL/L (ref 3.5–5.3)
RBC # BLD AUTO: 2.77 MILLION/UL (ref 3.88–5.62)
SODIUM SERPL-SCNC: 134 MMOL/L (ref 136–145)
WBC # BLD AUTO: 4.69 THOUSAND/UL (ref 4.31–10.16)

## 2022-05-20 PROCEDURE — 99239 HOSP IP/OBS DSCHRG MGMT >30: CPT | Performed by: INTERNAL MEDICINE

## 2022-05-20 PROCEDURE — 99232 SBSQ HOSP IP/OBS MODERATE 35: CPT | Performed by: INTERNAL MEDICINE

## 2022-05-20 PROCEDURE — 85027 COMPLETE CBC AUTOMATED: CPT | Performed by: INTERNAL MEDICINE

## 2022-05-20 PROCEDURE — 80048 BASIC METABOLIC PNL TOTAL CA: CPT | Performed by: INTERNAL MEDICINE

## 2022-05-20 RX ADMIN — CARVEDILOL 3.12 MG: 3.12 TABLET, FILM COATED ORAL at 08:32

## 2022-05-20 RX ADMIN — DOXERCALCIFEROL 3 MCG: 4 INJECTION, SOLUTION INTRAVENOUS at 16:48

## 2022-05-20 RX ADMIN — GUAIFENESIN 600 MG: 600 TABLET, EXTENDED RELEASE ORAL at 08:32

## 2022-05-20 RX ADMIN — ASPIRIN 81 MG CHEWABLE TABLET 81 MG: 81 TABLET CHEWABLE at 08:32

## 2022-05-20 NOTE — PROGRESS NOTES
NEPHROLOGY PROGRESS NOTE   Awa Alarcon  61 y o  male MRN: [id number]  Unit/Bed#: [place] 68 2 -01 Encounter: [id number]  Reason for Consult: ESRD on HD    ASSESSMENT/PLAN:  ESRD on HD:  Hemodialysis dependent x2 years   Following with [facility] Transplant Team   -receiving maintenance dialysis on Monday Wednesday Friday schedule at [facility]   -will provide dialysis treatment today    -will need to follow TTS schedule at [place] due to [place] before returning to home unit  -EDW:  106 5 kg  Last post HD weight 102 2 kg      Access:  PermCath, site intact  -left upper extremity AVG, recent admission on 05/12 at [facility] with occluded fistula, underwent AVG placement and ligation of left upper extremity fistula      Blood pressure:  stable low   -continue UF with HD as BP tolerates  -OP medications:  Coreg 3 125 mg 2 times per day, losartan 25 mg every evening   -discontinue Losartan   -holding parameters placed for SBP <130 mmHg       Anemia in CKD:   -FRED:  None, hemoglobin at goal   -goal Hgb 10-12 g/dL  -continue to monitor and transfuse as needed for Hgb <7 0       MBD in CKD:   -continue to monitor PTH, phos, and Mg as OP    -Mg and phos level within normal limits  -recommend renal diet    -continue Hectorol 3 mcg with dialysis and renal vitamin      Volume status:  Echocardiogram showed EF of 28% with grade 1 abnormal relaxation   -chest x-ray negative for acute cardiopulmonary disease  -BNP greater than 66,000  -continue UF as BP allows  -recommend fluid restriction       Electrolytes:   -will continue to monitor and trend with hemodialysis      COVID-19:  Positive on 05/16/2022   Further treatment per primary care team   Currently on room air  -BNP elevated  -D-dimer elevated      Right lower extremity pain:  -duplex negative for evidence of DVT  Disposition:  Okay to discharge from [facility] today after hemodialysis treatment      SUBJECTIVE:  The patient is resting in his bed  He report that he feels much improved  He feels ready to go home  He denies any chest discomfort or increased shortness of breath  He denies nausea, vomiting, diarrhea  He denies current like pain  We discussed that he needs to following TTS schedule at [facility] in [place] and he is not to return to [place]  He voices understanding      OBJECTIVE:  Current Weight: Weight - Scale: 102 kg (224 lb 10 4 oz)  Vitals:    05/19/22 2015 05/19/22 2201 05/20/22 0336 05/20/22 0600   BP: 95/54 96/55 112/58    BP Location: Right arm      Pulse: 61 72     Resp: 17  20    Temp: 98 4 °F (36 9 °C) 98 3 °F (36 8 °C) 98 9 °F (37 2 °C)    TempSrc: Oral Oral     SpO2: 97%      Weight:    102 kg (224 lb 10 4 oz)   Height:           Intake/Output Summary (Last 24 hours) at 5/20/2022 1017  Last data filed at 5/20/2022 6071  Gross per 24 hour   Intake 560 ml   Output --   Net 560 ml     General: NAD  Skin: warm, dry, intact, no rash  HEENT: Moist mucous membranes, sclera anicteric, normocephalic, atraumatic  Neck: No apparent JVD appreciated  Chest: lung sounds decreased B/L, on RA, perm cath    CVS:Regular rate and rhythm, no murmer   Abdomen: Soft, round, non-tender, +BS  Extremities: No B/L LE edema present, LUE AVG  Neuro: alert and oriented  Psych: appropriate mood and affect     Medications:    Current Facility-Administered Medications:     acetaminophen (TYLENOL) tablet 650 mg, 650 mg, Oral, Q6H PRN, Shital Gutierrez PA-C    albuterol (PROVENTIL HFA,VENTOLIN HFA) inhaler 2 puff, 2 puff, Inhalation, Q6H PRN, Shital Gutierrez PA-C, 2 puff at 05/18/22 0956    aluminum-magnesium hydroxide-simethicone (MYLANTA) oral suspension 30 mL, 30 mL, Oral, Q6H PRN, Shital Gutierrez PA-C    aspirin chewable tablet 81 mg, 81 mg, Oral, Daily, Shital Gutierrez PA-C, 81 mg at 05/20/22 6167    atorvastatin (LIPITOR) tablet 20 mg, 20 mg, Oral, After Dinner, Shital Gutierrez PA-C, 20 mg at 05/19/22 1714    carvedilol (COREG) tablet 3 125 mg, 3 125 mg, Oral, BID With Meals, MIGUEL Chance, 3 125 mg at 05/20/22 0310    chlorproMAZINE (THORAZINE) tablet 25 mg, 25 mg, Oral, Q6H PRN, Mercy Carnes PA-C, 25 mg at 05/18/22 2229    dextromethorphan-guaiFENesin (ROBITUSSIN DM) oral syrup 10 mL, 10 mL, Oral, Q4H PRN, Shital Gutierrez PA-C, 10 mL at 05/18/22 2017    doxercalciferol (HECTOROL) injection 3 mcg, 3 mcg, Intravenous, Once per day on Mon Wed Fri, MIGUEL Waddell, 3 mcg at 05/18/22 1609    guaiFENesin (MUCINEX) 12 hr tablet 600 mg, 600 mg, Oral, Q12H [address], Shital Gutierrez PA-C, 600 mg at 05/20/22 4263    heparin (porcine) subcutaneous injection 5,000 Units, 5,000 Units, Subcutaneous, Q8H [address], Lord Jill MD, 5,000 Units at 05/19/22 1304    lidocaine (XYLOCAINE) 4 % topical solution 5 mL, 5 mL, Topical, Daily PRN, MIGUEL Chance    losartan (COZAAR) tablet 12 5 mg, 12 5 mg, Oral, HS, MIGUEL Chance    ondansetron [facility]) injection 4 mg, 4 mg, Intravenous, Q6H PRN, Shital Gutierrez PA-C    [COMPLETED] remdesivir Rae Merino) 200 mg in sodium chloride 0 9 % 290 mL IVPB, 200 mg, Intravenous, Q24H, 200 mg at 05/18/22 1435 **FOLLOWED BY** remdesivir (Veklury) 100 mg in sodium chloride 0 9 % 270 mL IVPB, 100 mg, Intravenous, Q24H, Lord Jill MD, 100 mg at 05/19/22 1230    sodium chloride (OCEAN) 0 65 % nasal spray 1 spray, 1 spray, Each Nare, Q1H PRN, Flavio Quarles PA-C    Laboratory Results:  Results from last 7 days   Lab Units 05/19/22  0525 05/18/22  0233   WBC Thousand/uL 4 89 7 71   HEMOGLOBIN g/dL 10 4* 12 1   HEMATOCRIT % 29 9* 35 8*   PLATELETS Thousands/uL 118* 163   SODIUM mmol/L 136 136   POTASSIUM mmol/L 4 0 5 2   CHLORIDE mmol/L 100 103   CO2 mmol/L 21 18*   BUN mg/dL 43* 67*   CREATININE mg/dL 10 18* 14 53*   CALCIUM mg/dL 8 3 9 2   MAGNESIUM mg/dL 1 9  --    PHOSPHORUS mg/dL 3 5  --    ALK PHOS U/L  --  41*   ALT U/L  --  21   AST U/L  --  26

## 2022-05-20 NOTE — ASSESSMENT & PLAN NOTE
Wt Readings from Last 3 Encounters:   05/20/22 102 kg (224 lb 10 4 oz)   03/08/22 107 kg (235 lb)   02/08/22 112 kg (247 lb)     Control via hemodialysis

## 2022-05-20 NOTE — PLAN OF CARE
Problem: Potential for Falls  Goal: Patient will remain free of falls  Description: INTERVENTIONS:  - Educate patient/family on patient safety including physical limitations  - Instruct patient to call for assistance with activity   - Consult OT/PT to assist with strengthening/mobility   - Keep Call bell within reach  - Keep bed low and locked with side rails adjusted as appropriate  - Keep care items and personal belongings within reach  - Initiate and maintain comfort rounds  - Make Fall Risk Sign visible to staff  - Offer Toileting every 2 Hours, in advance of need  - Initiate/Maintain bed alarm  - Obtain necessary fall risk management equipment:   - Apply yellow socks and bracelet for high fall risk patients  - Consider moving patient to room near nurses station  Outcome: Progressing     Problem: NEUROSENSORY - ADULT  Goal: Achieves stable or improved neurological status  Description: INTERVENTIONS  - Monitor and report changes in neurological status  - Monitor vital signs such as temperature, blood pressure, glucose, and any other labs ordered   - Initiate measures to prevent increased intracranial pressure  - Monitor for seizure activity and implement precautions if appropriate      Outcome: Progressing  Goal: Remains free of injury related to seizures activity  Description: INTERVENTIONS  - Maintain airway, patient safety  and administer oxygen as ordered  - Monitor patient for seizure activity, document and report duration and description of seizure to physician/advanced practitioner  - If seizure occurs,  ensure patient safety during seizure  - Reorient patient post seizure  - Seizure pads on all 4 side rails  - Instruct patient/family to notify RN of any seizure activity including if an aura is experienced  - Instruct patient/family to call for assistance with activity based on nursing assessment  - Administer anti-seizure medications if ordered    Outcome: Progressing  Goal: Achieves maximal functionality and self care  Description: INTERVENTIONS  - Monitor swallowing and airway patency with patient fatigue and changes in neurological status  - Encourage and assist patient to increase activity and self care     - Encourage visually impaired, hearing impaired and aphasic patients to use assistive/communication devices  Outcome: Progressing     Problem: CARDIOVASCULAR - ADULT  Goal: Maintains optimal cardiac output and hemodynamic stability  Description: INTERVENTIONS:  - Monitor I/O, vital signs and rhythm  - Monitor for S/S and trends of decreased cardiac output  - Administer and titrate ordered vasoactive medications to optimize hemodynamic stability  - Assess quality of pulses, skin color and temperature  - Assess for signs of decreased coronary artery perfusion  - Instruct patient to report change in severity of symptoms  Outcome: Progressing  Goal: Absence of cardiac dysrhythmias or at baseline rhythm  Description: INTERVENTIONS:  - Continuous cardiac monitoring, vital signs, obtain 12 lead EKG if ordered  - Administer antiarrhythmic and heart rate control medications as ordered  - Monitor electrolytes and administer replacement therapy as ordered  Outcome: Progressing     Problem: RESPIRATORY - ADULT  Goal: Achieves optimal ventilation and oxygenation  Description: INTERVENTIONS:  - Assess for changes in respiratory status  - Assess for changes in mentation and behavior  - Position to facilitate oxygenation and minimize respiratory effort  - Oxygen administered by appropriate delivery if ordered  - Initiate smoking cessation education as indicated  - Encourage broncho-pulmonary hygiene including cough, deep breathe, Incentive Spirometry  - Assess the need for suctioning and aspirate as needed  - Assess and instruct to report SOB or any respiratory difficulty  - Respiratory Therapy support as indicated  Outcome: Progressing     Problem: GASTROINTESTINAL - ADULT  Goal: Minimal or absence of nausea and/or vomiting  Description: INTERVENTIONS:  - Administer IV fluids if ordered to ensure adequate hydration  - Maintain NPO status until nausea and vomiting are resolved  - Nasogastric tube if ordered  - Administer ordered antiemetic medications as needed  - Provide nonpharmacologic comfort measures as appropriate  - Advance diet as tolerated, if ordered  - Consider nutrition services referral to assist patient with adequate nutrition and appropriate food choices  Outcome: Progressing  Goal: Maintains or returns to baseline bowel function  Description: INTERVENTIONS:  - Assess bowel function  - Encourage oral fluids to ensure adequate hydration  - Administer IV fluids if ordered to ensure adequate hydration  - Administer ordered medications as needed  - Encourage mobilization and activity  - Consider nutritional services referral to assist patient with adequate nutrition and appropriate food choices  Outcome: Progressing  Goal: Maintains adequate nutritional intake  Description: INTERVENTIONS:  - Monitor percentage of each meal consumed  - Identify factors contributing to decreased intake, treat as appropriate  - Assist with meals as needed  - Monitor I&O, weight, and lab values if indicated  - Obtain nutrition services referral as needed  Outcome: Progressing  Goal: Establish and maintain optimal ostomy function  Description: INTERVENTIONS:  - Assess bowel function  - Encourage oral fluids to ensure adequate hydration  - Administer IV fluids if ordered to ensure adequate hydration   - Administer ordered medications as needed  - Encourage mobilization and activity  - Nutrition services referral to assist patient with appropriate food choices  - Assess stoma site  - Consider wound care consult   Outcome: Progressing  Goal: Oral mucous membranes remain intact  Description: INTERVENTIONS  - Assess oral mucosa and hygiene practices  - Implement preventative oral hygiene regimen  - Implement oral medicated treatments as ordered  - Initiate Nutrition services referral as needed  Outcome: Progressing     Problem: GENITOURINARY - ADULT  Goal: Maintains or returns to baseline urinary function  Description: INTERVENTIONS:  - Assess urinary function  - Encourage oral fluids to ensure adequate hydration if ordered  - Administer IV fluids as ordered to ensure adequate hydration  - Administer ordered medications as needed  - Offer frequent toileting  - Follow urinary retention protocol if ordered  Outcome: Progressing  Goal: Absence of urinary retention  Description: INTERVENTIONS:  - Assess patients ability to void and empty bladder  - Monitor I/O  - Bladder scan as needed  - Discuss with physician/AP medications to alleviate retention as needed  - Discuss catheterization for long term situations as appropriate  Outcome: Progressing  Goal: Urinary catheter remains patent  Description: INTERVENTIONS:  - Assess patency of urinary catheter  - If patient has a chronic gomez, consider changing catheter if non-functioning  - Follow guidelines for intermittent irrigation of non-functioning urinary catheter  Outcome: Progressing     Problem: METABOLIC, FLUID AND ELECTROLYTES - ADULT  Goal: Electrolytes maintained within normal limits  Description: INTERVENTIONS:  - Monitor labs and assess patient for signs and symptoms of electrolyte imbalances  - Administer electrolyte replacement as ordered  - Monitor response to electrolyte replacements, including repeat lab results as appropriate  - Instruct patient on fluid and nutrition as appropriate  Outcome: Progressing  Goal: Fluid balance maintained  Description: INTERVENTIONS:  - Monitor labs   - Monitor I/O and WT  - Instruct patient on fluid and nutrition as appropriate  - Assess for signs & symptoms of volume excess or deficit  Outcome: Progressing  Goal: Glucose maintained within target range  Description: INTERVENTIONS:  - Monitor Blood Glucose as ordered  - Assess for signs and symptoms of hyperglycemia and hypoglycemia  - Administer ordered medications to maintain glucose within target range  - Assess nutritional intake and initiate nutrition service referral as needed  Outcome: Progressing     Problem: SKIN/TISSUE INTEGRITY - ADULT  Goal: Skin Integrity remains intact(Skin Breakdown Prevention)  Description: Assess:  -Perform Herman assessment every shift  -Clean and moisturize skin every shift  -Inspect skin when repositioning, toileting, and assisting with ADLS  -Assess under medical devices such as masimo every shift  -Assess extremities for adequate circulation and sensation     Bed Management:  -Have minimal linens on bed & keep smooth, unwrinkled  -Change linens as needed when moist or perspiring  -Avoid sitting or lying in one position for more than 2 hours while in bed  -Keep HOB at 30 degrees     Toileting:  -Offer bedside commode  -Assess for incontinence every 2 hours  -Use incontinent care products after each incontinent episode such as hydroguard    Activity:  -Mobilize patient 4 times a day  -Encourage activity and walks on unit  -Encourage or provide ROM exercises   -Turn and reposition patient every 2 Hours  -Use appropriate equipment to lift or move patient in bed  -Instruct/ Assist with weight shifting every 10 min when out of bed in chair  -Consider limitation of chair time 2 hour intervals    Skin Care:  -Avoid use of baby powder, tape, friction and shearing, hot water or constrictive clothing  -Relieve pressure over bony prominences using allevyn  -Do not massage red bony areas    Next Steps:  -Teach patient strategies to minimize risks such as turning   -Consider consults to  interdisciplinary teams such as   Outcome: Progressing  Goal: Incision(s), wounds(s) or drain site(s) healing without S/S of infection  Description: INTERVENTIONS  - Assess and document dressing, incision, wound bed, drain sites and surrounding tissue  - Provide patient and family education  - Perform skin care/dressing changes every shift  Outcome: Progressing  Goal: Pressure injury heals and does not worsen  Description: Interventions:  - Implement low air loss mattress or specialty surface (Criteria met)  - Apply silicone foam dressing  - Instruct/assist with weight shifting every 10 minutes when in chair   - Limit chair time to 2 hour intervals  - Use special pressure reducing interventions such as waffle cushion when in chair   - Apply fecal or urinary incontinence containment device   - Perform passive or active ROM every shift  - Turn and reposition patient & offload bony prominences every 2 hours   - Utilize friction reducing device or surface for transfers   - Consider consults to  interdisciplinary teams such as wound care  - Use incontinent care products after each incontinent episode such as   - Consider nutrition services referral as needed  Outcome: Progressing     Problem: HEMATOLOGIC - ADULT  Goal: Maintains hematologic stability  Description: INTERVENTIONS  - Assess for signs and symptoms of bleeding or hemorrhage  - Monitor labs  - Administer supportive blood products/factors as ordered and appropriate  Outcome: Progressing     Problem: MUSCULOSKELETAL - ADULT  Goal: Maintain or return mobility to safest level of function  Description: INTERVENTIONS:  - Assess patient's ability to carry out ADLs; assess patient's baseline for ADL function and identify physical deficits which impact ability to perform ADLs (bathing, care of mouth/teeth, toileting, grooming, dressing, etc )  - Assess/evaluate cause of self-care deficits   - Assess range of motion  - Assess patient's mobility  - Assess patient's need for assistive devices and provide as appropriate  - Encourage maximum independence but intervene and supervise when necessary  - Involve family in performance of ADLs  - Assess for home care needs following discharge   - Consider OT consult to assist with ADL evaluation and planning for discharge  - Provide patient education as appropriate  Outcome: Progressing  Goal: Maintain proper alignment of affected body part  Description: INTERVENTIONS:  - Support, maintain and protect limb and body alignment  - Provide patient/ family with appropriate education  Outcome: Progressing     Problem: Nutrition/Hydration-ADULT  Goal: Nutrient/Hydration intake appropriate for improving, restoring or maintaining nutritional needs  Description: Monitor and assess patient's nutrition/hydration status for malnutrition  Collaborate with interdisciplinary team and initiate plan and interventions as ordered  Monitor patient's weight and dietary intake as ordered or per policy  Utilize nutrition screening tool and intervene as necessary  Determine patient's food preferences and provide high-protein, high-caloric foods as appropriate       INTERVENTIONS:  - Monitor oral intake, urinary output, labs, and treatment plans  - Assess nutrition and hydration status and recommend course of action  - Evaluate amount of meals eaten  - Assist patient with eating if necessary   - Allow adequate time for meals  - Recommend/ encourage appropriate diets, oral nutritional supplements, and vitamin/mineral supplements  - Order, calculate, and assess calorie counts as needed  - Recommend, monitor, and adjust tube feedings and TPN/PPN based on assessed needs  - Assess need for intravenous fluids  - Provide specific nutrition/hydration education as appropriate  - Include patient/family/caregiver in decisions related to nutrition  Outcome: Progressing     Problem: MOBILITY - ADULT  Goal: Maintain or return to baseline ADL function  Description: INTERVENTIONS:  -  Assess patient's ability to carry out ADLs; assess patient's baseline for ADL function and identify physical deficits which impact ability to perform ADLs (bathing, care of mouth/teeth, toileting, grooming, dressing, etc )  - Assess/evaluate cause of self-care deficits   - Assess range of motion  - Assess patient's mobility; develop plan if impaired  - Assess patient's need for assistive devices and provide as appropriate  - Encourage maximum independence but intervene and supervise when necessary  - Involve family in performance of ADLs  - Assess for home care needs following discharge   - Consider OT consult to assist with ADL evaluation and planning for discharge  - Provide patient education as appropriate  Outcome: Progressing  Goal: Maintains/Returns to pre admission functional level  Description: INTERVENTIONS:  - Perform BMAT or MOVE assessment daily    - Set and communicate daily mobility goal to care team and patient/family/caregiver  - Collaborate with rehabilitation services on mobility goals if consulted  - Perform Range of Motion 4 times a day  - Reposition patient every 2 hours    - Dangle patient 3 times a day  - Stand patient 3 times a day  - Ambulate patient 3 times a day  - Out of bed to chair 3 times a day   - Out of bed for meals 3 times a day  - Out of bed for toileting  - Record patient progress and toleration of activity level   Outcome: Progressing

## 2022-05-20 NOTE — NURSING NOTE
RN entered room to note pt's bed raised to height of approx  4ft off the floor  RN attempted to educate pt on safety & hospital policy pertaining top bed height  Pt stated "As soon as you leave, I'm just going to raise it right back up again " Attempted education again  Pt resistant to education

## 2022-05-20 NOTE — PLAN OF CARE
Started pt on HD tx with UF goal of 2 5L net as tolerated (and as per pt request) x 3 hours x 3K bath for serum k+ of 4 0 on 5/19/22     2 hours and 10 minutes into the tx, the machine alarmed high  and low TMP, showing signs of clotting  Was able to return the blood and told the patient that we can resume the tx after I change the system but he refused because he experienced right leg cramps and he's for discharge tonight anyway  Post-Dialysis RN Treatment Note    Blood Pressure:  Pre 107/54 mm/Hg  Post 131/61 mmHg   EDW  106 5 kg    Weight:  Pre 102 5 kg   Post 100 3 kg   Mode of weight measurement: Bed Scale   Volume Removed  1,500 ml net    Treatment duration 130 minutes    NS given  No    Treatment shortened? Yes, describe: system clotted and pt refused to resume  Dr Vargas Ashton made aware     Medications given during Rx Hectorol 3mcg IV   Estimated Kt/V  Not Applicable   Access type: Permacath/TDC   Access Issues: No    Report called to primary nurse   Yes, Alfonso Milian RN      Problem: METABOLIC, FLUID AND ELECTROLYTES - ADULT  Goal: Electrolytes maintained within normal limits  Description: INTERVENTIONS:  - Monitor labs and assess patient for signs and symptoms of electrolyte imbalances  - Administer electrolyte replacement as ordered  - Monitor response to electrolyte replacements, including repeat lab results as appropriate  - Instruct patient on fluid and nutrition as appropriate  Outcome: Progressing  Goal: Fluid balance maintained  Description: INTERVENTIONS:  - Monitor labs   - Monitor I/O and WT  - Instruct patient on fluid and nutrition as appropriate  - Assess for signs & symptoms of volume excess or deficit  Outcome: Progressing

## 2022-05-20 NOTE — ASSESSMENT & PLAN NOTE
Symptomatic COVID infection with no pneumonia on chest x-ray  He will receive his 3rd dose of remdesivir today  He did not require steroids since he was not hypoxic  Clinically improved and stable for discharge today

## 2022-05-20 NOTE — ASSESSMENT & PLAN NOTE
Dialysis today per Nephrology    He will continue outpatient hemodialysis every Monday Wednesday Friday

## 2022-05-20 NOTE — DISCHARGE SUMMARY
[address] [facility]  Discharge- Dilma Tran  1965, 61 y o  male MRN: [id number]  Unit/Bed#: [facility] 68 2 -01 Encounter: [id number]  Primary Care Provider: Rosenda Diamond MD   Date and time admitted to hospital: 5/18/2022  1:54 AM    * COVID-19 virus infection  Assessment & Plan  Symptomatic COVID infection with no pneumonia on chest x-ray  He will receive his 3rd dose of remdesivir today  He did not require steroids since he was not hypoxic  Clinically improved and stable for discharge today    ESRD (end stage renal disease) [facility])  Assessment & Plan  Dialysis today per Nephrology  He will continue outpatient hemodialysis every Monday Wednesday Friday    Chronic systolic CHF (congestive heart failure) ([facility])  Assessment & Plan  Wt Readings from Last 3 Encounters:   05/20/22 102 kg (224 lb 10 4 oz)   03/08/22 107 kg (235 lb)   02/08/22 112 kg (247 lb)     Control via hemodialysis    Hypertension  Assessment & Plan  Continue Coreg 3 125 b i d   DC losartan indefinitely    Dyslipidemia, goal LDL below 70  Assessment & Plan  Continue Lipitor 20 mg daily    CAD in native artery  Assessment & Plan  Stable  Continue baby aspirin, Lipitor 20 mg daily, Coreg 3 125 mg b i d  Discharging Physician / Practitioner: Sherren Ro, MD  PCP: Rosenda Diamond MD  Admission Date:   Admission Orders (From admission, onward)     Ordered        05/18/22 0528  INPATIENT ADMISSION  Once                      Discharge Date: 05/20/22    Medical Problems             Resolved Problems  Date Reviewed: 5/20/2022   None                 Consultations During Hospital Stay:  · Nephrology    Procedures Performed:   · Dialysis    Significant Findings / Test Results:   · Positive COVID  · Chest x-ray without pneumonia    Incidental Findings:   · None     Test Results Pending at Discharge (will require follow up):    · None     Outpatient Tests Requested:  · None    Complications:  None    Reason for Admission:  Shortness of breath    Hospital Course:     Marcelino Johns  is a 61 y o  male patient who originally presented to the hospital on 5/18/2022 due to shortness of breath for 3 days  He has known history of ESRD, hypertension and coronary artery disease  He tested positive for COVID  Fortunately his chest x-ray showed no acute disease  He was admitted for symptomatic COVID infection on top of his comorbidities  He received 3 days of IV remdesivir  He improved and is stable for discharge home today  Per most recent protocol, he does not need to complete 5 days of remdesivir in the hospital   He was not hypoxic so he did not require steroids  He is stable for discharge home today and will continue outpatient hemodialysis every Monday Wednesday Friday  Please see above list of diagnoses and related plan for additional information  Condition at Discharge: good     Discharge Day Visit / Exam:     Subjective:  "I am ready to go home" improved malaise and cough  Vitals: Blood Pressure: 128/73 (05/20/22 1130)  Pulse: 72 (05/19/22 2201)  Temperature: 98 9 °F (37 2 °C) (05/20/22 0336)  Temp Source: Oral (05/19/22 2201)  Respirations: 20 (05/20/22 0336)  Height: 5' 10" (177 8 cm) (05/19/22 1724)  Weight - Scale: 102 kg (224 lb 10 4 oz) (05/20/22 0600)  SpO2: 97 % (05/19/22 2015)  Exam:   Physical Exam  Constitutional:       Appearance: He is not ill-appearing or diaphoretic  HENT:      Head: Normocephalic  Nose: No rhinorrhea  Eyes:      General: No scleral icterus  Cardiovascular:      Rate and Rhythm: Normal rate and regular rhythm  Pulmonary:      Effort: Pulmonary effort is normal  No respiratory distress  Breath sounds: No wheezing or rales  Abdominal:      General: Abdomen is flat  There is no distension  Palpations: Abdomen is soft  Tenderness: There is no abdominal tenderness  Musculoskeletal:      Cervical back: Neck supple  Right lower leg: No edema  Left lower leg: No edema  Skin:     General: Skin is warm and dry  Neurological:      Mental Status: He is alert and oriented to person, place, and time  Psychiatric:         Behavior: Behavior normal        Discussion with Family:  Offered to call his family to update them  He declined  Discharge instructions/Information to patient and family:   See after visit summary for information provided to patient and family  Provisions for Follow-Up Care:  See after visit summary for information related to follow-up care and any pertinent home health orders  Disposition:     Home    Planned Readmission: no     Discharge Statement:  I spent >30 minutes discharging the patient  This time was spent on the day of discharge  I had direct contact with the patient on the day of discharge  Greater than 50% of the total time was spent examining patient, answering all patient questions, arranging and discussing plan of care with patient as well as directly providing post-discharge instructions  Additional time then spent on discharge activities  Discharge Medications:  See after visit summary for reconciled discharge medications provided to patient and family        ** Please Note: This note has been constructed using a voice recognition system **

## 2022-05-20 NOTE — PLAN OF CARE
Problem: Potential for Falls  Goal: Patient will remain free of falls  Description: INTERVENTIONS:  - Educate patient/family on patient safety including physical limitations  - Instruct patient to call for assistance with activity   - Consult OT/PT to assist with strengthening/mobility   - Keep Call bell within reach  - Keep bed low and locked with side rails adjusted as appropriate  - Keep care items and personal belongings within reach  - Initiate and maintain comfort rounds  - Make Fall Risk Sign visible to staff  - Offer Toileting every  Hours, in advance of need  - Initiate/Maintain alarm  - Obtain necessary fall risk management equipment:   - Apply yellow socks and bracelet for high fall risk patients  - Consider moving patient to room near nurses station  Outcome: Progressing     Problem: NEUROSENSORY - ADULT  Goal: Achieves stable or improved neurological status  Description: INTERVENTIONS  - Monitor and report changes in neurological status  - Monitor vital signs such as temperature, blood pressure, glucose, and any other labs ordered   - Initiate measures to prevent increased intracranial pressure  - Monitor for seizure activity and implement precautions if appropriate      Outcome: Progressing  Goal: Remains free of injury related to seizures activity  Description: INTERVENTIONS  - Maintain airway, patient safety  and administer oxygen as ordered  - Monitor patient for seizure activity, document and report duration and description of seizure to physician/advanced practitioner  - If seizure occurs,  ensure patient safety during seizure  - Reorient patient post seizure  - Seizure pads on all 4 side rails  - Instruct patient/family to notify RN of any seizure activity including if an aura is experienced  - Instruct patient/family to call for assistance with activity based on nursing assessment  - Administer anti-seizure medications if ordered    Outcome: Progressing  Goal: Achieves maximal functionality and self care  Description: INTERVENTIONS  - Monitor swallowing and airway patency with patient fatigue and changes in neurological status  - Encourage and assist patient to increase activity and self care     - Encourage visually impaired, hearing impaired and aphasic patients to use assistive/communication devices  Outcome: Progressing     Problem: CARDIOVASCULAR - ADULT  Goal: Maintains optimal cardiac output and hemodynamic stability  Description: INTERVENTIONS:  - Monitor I/O, vital signs and rhythm  - Monitor for S/S and trends of decreased cardiac output  - Administer and titrate ordered vasoactive medications to optimize hemodynamic stability  - Assess quality of pulses, skin color and temperature  - Assess for signs of decreased coronary artery perfusion  - Instruct patient to report change in severity of symptoms  Outcome: Progressing  Goal: Absence of cardiac dysrhythmias or at baseline rhythm  Description: INTERVENTIONS:  - Continuous cardiac monitoring, vital signs, obtain 12 lead EKG if ordered  - Administer antiarrhythmic and heart rate control medications as ordered  - Monitor electrolytes and administer replacement therapy as ordered  Outcome: Progressing     Problem: RESPIRATORY - ADULT  Goal: Achieves optimal ventilation and oxygenation  Description: INTERVENTIONS:  - Assess for changes in respiratory status  - Assess for changes in mentation and behavior  - Position to facilitate oxygenation and minimize respiratory effort  - Oxygen administered by appropriate delivery if ordered  - Initiate smoking cessation education as indicated  - Encourage broncho-pulmonary hygiene including cough, deep breathe, Incentive Spirometry  - Assess the need for suctioning and aspirate as needed  - Assess and instruct to report SOB or any respiratory difficulty  - Respiratory Therapy support as indicated  Outcome: Progressing     Problem: GASTROINTESTINAL - ADULT  Goal: Minimal or absence of nausea and/or vomiting  Description: INTERVENTIONS:  - Administer IV fluids if ordered to ensure adequate hydration  - Maintain NPO status until nausea and vomiting are resolved  - Nasogastric tube if ordered  - Administer ordered antiemetic medications as needed  - Provide nonpharmacologic comfort measures as appropriate  - Advance diet as tolerated, if ordered  - Consider nutrition services referral to assist patient with adequate nutrition and appropriate food choices  Outcome: Progressing  Goal: Maintains or returns to baseline bowel function  Description: INTERVENTIONS:  - Assess bowel function  - Encourage oral fluids to ensure adequate hydration  - Administer IV fluids if ordered to ensure adequate hydration  - Administer ordered medications as needed  - Encourage mobilization and activity  - Consider nutritional services referral to assist patient with adequate nutrition and appropriate food choices  Outcome: Progressing  Goal: Maintains adequate nutritional intake  Description: INTERVENTIONS:  - Monitor percentage of each meal consumed  - Identify factors contributing to decreased intake, treat as appropriate  - Assist with meals as needed  - Monitor I&O, weight, and lab values if indicated  - Obtain nutrition services referral as needed  Outcome: Progressing  Goal: Establish and maintain optimal ostomy function  Description: INTERVENTIONS:  - Assess bowel function  - Encourage oral fluids to ensure adequate hydration  - Administer IV fluids if ordered to ensure adequate hydration   - Administer ordered medications as needed  - Encourage mobilization and activity  - Nutrition services referral to assist patient with appropriate food choices  - Assess stoma site  - Consider wound care consult   Outcome: Progressing  Goal: Oral mucous membranes remain intact  Description: INTERVENTIONS  - Assess oral mucosa and hygiene practices  - Implement preventative oral hygiene regimen  - Implement oral medicated treatments as ordered  - Initiate Nutrition services referral as needed  Outcome: Progressing     Problem: GENITOURINARY - ADULT  Goal: Maintains or returns to baseline urinary function  Description: INTERVENTIONS:  - Assess urinary function  - Encourage oral fluids to ensure adequate hydration if ordered  - Administer IV fluids as ordered to ensure adequate hydration  - Administer ordered medications as needed  - Offer frequent toileting  - Follow urinary retention protocol if ordered  Outcome: Progressing  Goal: Absence of urinary retention  Description: INTERVENTIONS:  - Assess patients ability to void and empty bladder  - Monitor I/O  - Bladder scan as needed  - Discuss with physician/AP medications to alleviate retention as needed  - Discuss catheterization for long term situations as appropriate  Outcome: Progressing  Goal: Urinary catheter remains patent  Description: INTERVENTIONS:  - Assess patency of urinary catheter  - If patient has a chronic gomez, consider changing catheter if non-functioning  - Follow guidelines for intermittent irrigation of non-functioning urinary catheter  Outcome: Progressing     Problem: METABOLIC, FLUID AND ELECTROLYTES - ADULT  Goal: Electrolytes maintained within normal limits  Description: INTERVENTIONS:  - Monitor labs and assess patient for signs and symptoms of electrolyte imbalances  - Administer electrolyte replacement as ordered  - Monitor response to electrolyte replacements, including repeat lab results as appropriate  - Instruct patient on fluid and nutrition as appropriate  Outcome: Progressing  Goal: Fluid balance maintained  Description: INTERVENTIONS:  - Monitor labs   - Monitor I/O and WT  - Instruct patient on fluid and nutrition as appropriate  - Assess for signs & symptoms of volume excess or deficit  Outcome: Progressing  Goal: Glucose maintained within target range  Description: INTERVENTIONS:  - Monitor Blood Glucose as ordered  - Assess for signs and symptoms of hyperglycemia and hypoglycemia  - Administer ordered medications to maintain glucose within target range  - Assess nutritional intake and initiate nutrition service referral as needed  Outcome: Progressing     Problem: SKIN/TISSUE INTEGRITY - ADULT  Goal: Skin Integrity remains intact(Skin Breakdown Prevention)  Description: Assess:  -Perform Herman assessment every   -Clean and moisturize skin every   -Inspect skin when repositioning, toileting, and assisting with ADLS  -Assess under medical devices such as  every   -Assess extremities for adequate circulation and sensation     Bed Management:  -Have minimal linens on bed & keep smooth, unwrinkled  -Change linens as needed when moist or perspiring  -Avoid sitting or lying in one position for more than  hours while in bed  -Keep HOB at degrees     Toileting:  -Offer bedside commode  -Assess for incontinence every   -Use incontinent care products after each incontinent episode such as     Activity:  -Mobilize patient  times a day  -Encourage activity and walks on unit  -Encourage or provide ROM exercises   -Turn and reposition patient every  Hours  -Use appropriate equipment to lift or move patient in bed  -Instruct/ Assist with weight shifting every  when out of bed in chair  -Consider limitation of chair time  hour intervals    Skin Care:  -Avoid use of baby powder, tape, friction and shearing, hot water or constrictive clothing  -Relieve pressure over bony prominences using   -Do not massage red bony areas    Next Steps:  -Teach patient strategies to minimize risks such as   -Consider consults to  interdisciplinary teams such as   Outcome: Progressing  Goal: Incision(s), wounds(s) or drain site(s) healing without S/S of infection  Description: INTERVENTIONS  - Assess and document dressing, incision, wound bed, drain sites and surrounding tissue  - Provide patient and family education  - Perform skin care/dressing changes every   Outcome: Progressing  Goal: Pressure injury heals and does not worsen  Description: Interventions:  - Implement low air loss mattress or specialty surface (Criteria met)  - Apply silicone foam dressing  - Instruct/assist with weight shifting every  minutes when in chair   - Limit chair time to hour intervals  - Use special pressure reducing interventions such as  when in chair   - Apply fecal or urinary incontinence containment device   - Perform passive or active ROM every   - Turn and reposition patient & offload bony prominences every  hours   - Utilize friction reducing device or surface for transfers   - Consider consults to  interdisciplinary teams such as   - Use incontinent care products after each incontinent episode such as  - Consider nutrition services referral as needed  Outcome: Progressing     Problem: HEMATOLOGIC - ADULT  Goal: Maintains hematologic stability  Description: INTERVENTIONS  - Assess for signs and symptoms of bleeding or hemorrhage  - Monitor labs  - Administer supportive blood products/factors as ordered and appropriate  Outcome: Progressing     Problem: MUSCULOSKELETAL - ADULT  Goal: Maintain or return mobility to safest level of function  Description: INTERVENTIONS:  - Assess patient's ability to carry out ADLs; assess patient's baseline for ADL function and identify physical deficits which impact ability to perform ADLs (bathing, care of mouth/teeth, toileting, grooming, dressing, etc )  - Assess/evaluate cause of self-care deficits   - Assess range of motion  - Assess patient's mobility  - Assess patient's need for assistive devices and provide as appropriate  - Encourage maximum independence but intervene and supervise when necessary  - Involve family in performance of ADLs  - Assess for home care needs following discharge   - Consider OT consult to assist with ADL evaluation and planning for discharge  - Provide patient education as appropriate  Outcome: Progressing  Goal: Maintain proper alignment of affected body part  Description: INTERVENTIONS:  - Support, maintain and protect limb and body alignment  - Provide patient/ family with appropriate education  Outcome: Progressing     Problem: Nutrition/Hydration-ADULT  Goal: Nutrient/Hydration intake appropriate for improving, restoring or maintaining nutritional needs  Description: Monitor and assess patient's nutrition/hydration status for malnutrition  Collaborate with interdisciplinary team and initiate plan and interventions as ordered  Monitor patient's weight and dietary intake as ordered or per policy  Utilize nutrition screening tool and intervene as necessary  Determine patient's food preferences and provide high-protein, high-caloric foods as appropriate       INTERVENTIONS:  - Monitor oral intake, urinary output, labs, and treatment plans  - Assess nutrition and hydration status and recommend course of action  - Evaluate amount of meals eaten  - Assist patient with eating if necessary   - Allow adequate time for meals  - Recommend/ encourage appropriate diets, oral nutritional supplements, and vitamin/mineral supplements  - Order, calculate, and assess calorie counts as needed  - Recommend, monitor, and adjust tube feedings and TPN/PPN based on assessed needs  - Assess need for intravenous fluids  - Provide specific nutrition/hydration education as appropriate  - Include patient/family/caregiver in decisions related to nutrition  Outcome: Progressing     Problem: MOBILITY - ADULT  Goal: Maintain or return to baseline ADL function  Description: INTERVENTIONS:  -  Assess patient's ability to carry out ADLs; assess patient's baseline for ADL function and identify physical deficits which impact ability to perform ADLs (bathing, care of mouth/teeth, toileting, grooming, dressing, etc )  - Assess/evaluate cause of self-care deficits   - Assess range of motion  - Assess patient's mobility; develop plan if impaired  - Assess patient's need for assistive devices and provide as appropriate  - Encourage maximum independence but intervene and supervise when necessary  - Involve family in performance of ADLs  - Assess for home care needs following discharge   - Consider OT consult to assist with ADL evaluation and planning for discharge  - Provide patient education as appropriate  Outcome: Progressing  Goal: Maintains/Returns to pre admission functional level  Description: INTERVENTIONS:  - Perform BMAT or MOVE assessment daily    - Set and communicate daily mobility goal to care team and patient/family/caregiver  - Collaborate with rehabilitation services on mobility goals if consulted  - Perform Range of Motion times a day  - Reposition patient every  hours    - Dangle patient times a day  - Stand patient times a day  - Ambulate patient times a day  - Out of bed to chair times a day   - Out of bed for meals  times a day  - Out of bed for toileting  - Record patient progress and toleration of activity level   Outcome: Progressing

## 2022-05-20 NOTE — DISCHARGE INSTR - AVS FIRST PAGE

## 2022-05-23 NOTE — UTILIZATION REVIEW
Notification of Discharge   This is a Notification of Discharge from our facility [address]  Please be advised that this patient has been discharge from our facility  Below you will find the admission and discharge date and time including the patients disposition  UTILIZATION REVIEW CONTACT:  Mayur Powell  Utilization   Network Utilization Review Department  Phone: [phone] x carefully listen to the prompts  All voicemails are confidential   Email: [email or web address]  [email or web address]     PHYSICIAN ADVISORY SERVICES:  FOR GMRM-TP-GDPF REVIEW - MEDICAL NECESSITY DENIAL  Phone: [phone]  Fax: [phone]  Email: [email or web address]     PRESENTATION DATE: 5/18/2022  1:54 AM    INPATIENT ADMISSION DATE: 5/18/22  5:28 AM   DISCHARGE DATE: 5/20/2022  6:38 PM  DISPOSITION: Home/Self Care Home/Self Care      IMPORTANT INFORMATION:  Send all requests for admission clinical reviews, approved or denied determinations and any other requests to dedicated fax number below belonging to the campus where the patient is receiving treatment   List of dedicated fax numbers:  FACILITY NAME UR FAX NUMBER   ADMISSION DENIALS (Administrative/Medical Necessity) [phone]   [address] [place] [address] (Maternity/NICU/Pediatrics) [phone]   [facility] [phone]   [address] [facility] 300 [facility] [phone]   [address] [place] [address] [address] [place], [place] [address] [phone]   [facility]  [phone]   [address] [facility] [address] [facility]  [address] [facility] [address]  Discharge- Gabi Christensen  1965, 61 y o  male MRN: [id number]  Unit/Bed#: [facility] 68 2 -01 Encounter: [id number]  Primary Care Provider: Laurie Yo MD   Date and time admitted to hospital: 5/18/2022  1:54 AM     * COVID-19 virus infection  Assessment & Plan  Symptomatic COVID infection with no pneumonia on chest x-ray  He will receive his 3rd dose of remdesivir today  He did not require steroids since he was not hypoxic  Clinically improved and stable for discharge today     ESRD (end stage renal disease) [facility])  Assessment & Plan  Dialysis today per Nephrology  He will continue outpatient hemodialysis every Monday Wednesday Friday     Chronic systolic CHF (congestive heart failure) (HCC)  Assessment & Plan      Wt Readings from Last 3 Encounters:   05/20/22 102 kg (224 lb 10 4 oz)   03/08/22 107 kg (235 lb)   02/08/22 112 kg (247 lb)      Control via hemodialysis     Hypertension  Assessment & Plan  Continue Coreg 3 125 b i d   DC losartan indefinitely     Dyslipidemia, goal LDL below 70  Assessment & Plan  Continue Lipitor 20 mg daily     CAD in native artery  Assessment & Plan  Stable  Continue baby aspirin, Lipitor 20 mg daily, Coreg 3 125 mg b i d         Discharging Physician / Practitioner: Sadi Hadley MD  PCP: Laurie Yo MD  Admission Date:   Admission Orders (From admission, onward)              Ordered          05/18/22 0528   INPATIENT ADMISSION  Once                          Discharge Date: 05/20/22         Medical Problems                  Resolved Problems  Date Reviewed: 5/20/2022   None                      Consultations During Hospital Stay:  · Nephrology     Procedures Performed:   · Dialysis     Significant Findings / Test Results:   · Positive COVID  · Chest x-ray without pneumonia     Incidental Findings:   · None      Test Results Pending at Discharge (will require follow up):    · None     Outpatient Tests Requested:  · None     Complications:  None     Reason for Admission:  Shortness of breath     Hospital Course:      Alexis Porras  is a 61 y o  male patient who originally presented to the hospital on 5/18/2022 due to shortness of breath for 3 days  He has known history of ESRD, hypertension and coronary artery disease  He tested positive for COVID  Fortunately his chest x-ray showed no acute disease  He was admitted for symptomatic COVID infection on top of his comorbidities  He received 3 days of IV remdesivir  He improved and is stable for discharge home today  Per most recent protocol, he does not need to complete 5 days of remdesivir in the hospital   He was not hypoxic so he did not require steroids      He is stable for discharge home today and will continue outpatient hemodialysis every Monday Wednesday Friday         Please see above list of diagnoses and related plan for additional information       Condition at Discharge: good      Discharge Day Visit / Exam:      Subjective:  "I am ready to go home" improved malaise and cough  Vitals: Blood Pressure: 128/73 (05/20/22 1130)  Pulse: 72 (05/19/22 2201)  Temperature: 98 9 °F (37 2 °C) (05/20/22 0336)  Temp Source: Oral (05/19/22 2201)  Respirations: 20 (05/20/22 0336)  Height: 5' 10" (177 8 cm) (05/19/22 1724)  Weight - Scale: 102 kg (224 lb 10 4 oz) (05/20/22 0600)  SpO2: 97 % (05/19/22 2015)  Exam:   Physical Exam  Constitutional:       Appearance: He is not ill-appearing or diaphoretic  HENT:      Head: Normocephalic  Nose: No rhinorrhea  Eyes:      General: No scleral icterus  Cardiovascular:      Rate and Rhythm: Normal rate and regular rhythm  Pulmonary:      Effort: Pulmonary effort is normal  No respiratory distress  Breath sounds: No wheezing or rales  Abdominal:      General: Abdomen is flat  There is no distension  Palpations: Abdomen is soft  Tenderness: There is no abdominal tenderness  Musculoskeletal:      Cervical back: Neck supple  Right lower leg: No edema  Left lower leg: No edema  Skin:     General: Skin is warm and dry     Neurological: Mental Status: He is alert and oriented to person, place, and time  Psychiatric:         Behavior: Behavior normal          Discussion with Family:  Offered to call his family to update them  He declined      Discharge instructions/Information to patient and family:   See after visit summary for information provided to patient and family        Provisions for Follow-Up Care:  See after visit summary for information related to follow-up care and any pertinent home health orders        Disposition:      Home     Planned Readmission: no     Discharge Statement:  I spent >30 minutes discharging the patient  This time was spent on the day of discharge  I had direct contact with the patient on the day of discharge  Greater than 50% of the total time was spent examining patient, answering all patient questions, arranging and discussing plan of care with patient as well as directly providing post-discharge instructions    Additional time then spent on discharge activities      Discharge Medications:  See after visit summary for reconciled discharge medications provided to patient and family        ** Please Note: This note has been constructed using a voice recognition system **

## 2022-05-24 ENCOUNTER — TRANSITIONAL CARE MANAGEMENT (OUTPATIENT)
Dept: FAMILY MEDICINE CLINIC | Facility: CLINIC | Age: 57
End: 2022-05-24

## 2022-05-24 ENCOUNTER — OFFICE VISIT (OUTPATIENT)
Dept: FAMILY MEDICINE CLINIC | Facility: CLINIC | Age: 57
End: 2022-05-24
Payer: COMMERCIAL

## 2022-05-24 DIAGNOSIS — U07.1 COVID-19 VIRUS INFECTION: Primary | ICD-10-CM

## 2022-05-24 DIAGNOSIS — I50.22 CHRONIC SYSTOLIC CHF (CONGESTIVE HEART FAILURE) (HCC): ICD-10-CM

## 2022-05-24 DIAGNOSIS — N18.6 ESRD (END STAGE RENAL DISEASE) (HCC): ICD-10-CM

## 2022-05-24 PROCEDURE — 99496 TRANSJ CARE MGMT HIGH F2F 7D: CPT | Performed by: FAMILY MEDICINE

## 2022-05-24 RX ORDER — METOPROLOL SUCCINATE 50 MG/1
50 TABLET, EXTENDED RELEASE ORAL DAILY
COMMUNITY
Start: 2022-03-29

## 2022-05-24 NOTE — PROGRESS NOTES
Virtual TCM Visit:    Verification of patient location:    Patient is located in the following state in which I hold an active license [place]        Assessment/Plan:        Problem List Items Addressed This Visit        Cardiovascular and Mediastinum    Chronic systolic CHF (congestive heart failure) ([facility] )     Wt Readings from Last 3 Encounters:   05/20/22 102 kg (224 lb 10 4 oz)   03/08/22 107 kg (235 lb)   02/08/22 112 kg (247 lb)         A chronic asymptomatic no sinus symptom of decompensation congestive heart failure I review medication with the patient he has a currently not on Coreg has been stop on March 22 by Cardiology he switched to metoprolol succinate 50 mg once a day the patient aware record review           Relevant Medications    metoprolol succinate (TOPROL-XL) 50 mg 24 hr tablet       Genitourinary    ESRD (end stage renal disease) ([facility] )     Lab Results   Component Value Date    EGFR 3 05/20/2022    EGFR 5 05/19/2022    EGFR 3 05/18/2022    CREATININE 12 82 (H) 05/20/2022    CREATININE 10 18 (H) 05/19/2022    CREATININE 14 53 (H) 05/18/2022   Patient on dialysis 3 times a day /week              Other    COVID-19 virus infection - Primary      Status post hospitalization the patient received the remdesivir IV a he was discharged home not on oxygen a chest x-ray no pneumonia                    Reason for visit is S/P hospitalization    Encounter provider Narinder Staton MD       Provider located at [facility] AT [address] [address] [phone]      Recent Visits  Date Type Provider Dept   05/24/22 Office Visit Narinder Staton MD Pg  Primary Care [facility]   Showing recent visits within past 7 days and meeting all other requirements  Future Appointments  No visits were found meeting these conditions    Showing future appointments within next 150 days and meeting all other requirements       After connecting through [employer], the patient was identified by name and date of birth  Radhika Cline  was informed that this is a telemedicine visit and that the visit is being conducted through [address] [place] [address] [facility] [employer] and patient was informed that this is a secure, HIPAA-compliant platform  He agrees to proceed     My office door was closed  No one else was in the room  He acknowledged consent and understanding of privacy and security of the video platform  The patient has agreed to participate and understands they can discontinue the visit at any time  Patient is aware this is a billable service  Subjective:     Patient ID: Radhika Cline  is a 61 y o  male  Patient is having virtual visit status post hospitalization on May 18 22 went to the hospital secondary to short of breath has been going on for 3 days the patient had history of congestive heart failure coronary artery disease chronic kidney disease on dialysis and patient found to have a positive for COVID and chest x-ray was negative for pneumonia patient received the room disease Advair IV for 3 days he was stable to discharge home on May 20th hospital record the including chest x-ray blood work review with the patient a medication reconciled with the patient patient currently not on Coreg has been discontinued by Cardiology on March 2022 he is currently on metoprolol      Review of Systems   Constitutional: Positive for fatigue  Negative for activity change, appetite change and fever  HENT: Negative for congestion, ear pain, postnasal drip, sinus pressure, sinus pain, sneezing and sore throat  Eyes: Negative for pain, discharge, redness and itching  Respiratory: Negative for cough, chest tightness, shortness of breath and stridor  Cardiovascular: Negative for chest pain, palpitations and leg swelling  Gastrointestinal: Negative for abdominal distention, abdominal pain, blood in stool, constipation, diarrhea and nausea     Genitourinary: Negative for dysuria, flank pain, frequency and hematuria  Musculoskeletal: Negative for back pain, joint swelling and neck pain  Skin: Negative for pallor and rash  Neurological: Negative for dizziness, tremors, weakness, numbness and headaches  Hematological: Does not bruise/bleed easily  Objective: There were no vitals filed for this visit  Physical Exam  Constitutional:       Appearance: He is not ill-appearing, toxic-appearing or diaphoretic  HENT:      Head: Normocephalic and atraumatic  Nose: Nose normal    Eyes:      General:         Right eye: No discharge  Left eye: No discharge  Pulmonary:      Effort: Pulmonary effort is normal  No respiratory distress  Abdominal:      General: There is no distension  Skin:     Findings: No rash  Neurological:      Mental Status: He is alert and oriented to person, place, and time  Transitional Care Management Review:  Hughes Blizzard  is a 61 y o  male here for TCM follow up  During the TCM phone call patient stated:    TCM Call (since 4/24/2022)     Date and time call was made  5/23/2022 11:10 AM    Hospital care reviewed  Records not available    Patient was hospitialized at  [address] [place] [address]    Date of Admission  05/18/22    Date of discharge  05/20/22    Diagnosis  covid    Disposition  Home    Were the patients medications reviewed and updated  No    Current Symptoms  None      TCM Call (since 4/24/2022)     Post hospital issues  None    Should patient be enrolled in anticoag monitoring? No    Scheduled for follow up?   Yes    Did you obtain your prescribed medications  Yes    Do you need help managing your prescriptions or medications  No    Is transportation to your appointment needed  No    I have advised the patient to call PCP with any new or worsening symptoms  sally ji    Living Arrangements  Family members    Support System  Family    The type of support provided  Emotional; Physical    Are you recieving any outpatient services  No    Are you recieving home care services  No    Are you using any community resources  No    Current waiver services  No    Have you fallen in the last 12 months  No    Interperter language line needed  No    Counseling  Patient          I spent 20 minutes with the patient during this visit  Sidra Leal MD      VIRTUAL VISIT [facility] [place] Tobias  verbally agrees to participate in [employer]  Pt is aware that [employer] could be limited without vital signs or the ability to perform a full hands-on physical exam  Kye Tobias  understands he or the provider may request at any time to terminate the video visit and request the patient to seek care or treatment in person

## 2022-05-25 NOTE — ASSESSMENT & PLAN NOTE
Status post hospitalization the patient received the remdesivir IV a he was discharged home not on oxygen a chest x-ray no pneumonia

## 2022-05-25 NOTE — ASSESSMENT & PLAN NOTE
Lab Results   Component Value Date    EGFR 3 05/20/2022    EGFR 5 05/19/2022    EGFR 3 05/18/2022    CREATININE 12 82 (H) 05/20/2022    CREATININE 10 18 (H) 05/19/2022    CREATININE 14 53 (H) 05/18/2022   Patient on dialysis 3 times a day /week

## 2022-05-25 NOTE — ASSESSMENT & PLAN NOTE
Wt Readings from Last 3 Encounters:   05/20/22 102 kg (224 lb 10 4 oz)   03/08/22 107 kg (235 lb)   02/08/22 112 kg (247 lb)         A chronic asymptomatic no sinus symptom of decompensation congestive heart failure I review medication with the patient he has a currently not on Coreg has been stop on March 22 by Cardiology he switched to metoprolol succinate 50 mg once a day the patient aware record review

## 2022-06-13 DIAGNOSIS — E78.5 DYSLIPIDEMIA, GOAL LDL BELOW 70: ICD-10-CM

## 2022-06-14 RX ORDER — ATORVASTATIN CALCIUM 20 MG/1
TABLET, FILM COATED ORAL
Qty: 30 TABLET | Refills: 2 | Status: SHIPPED | OUTPATIENT
Start: 2022-06-14

## 2022-06-21 ENCOUNTER — TELEPHONE (OUTPATIENT)
Dept: ADMINISTRATIVE | Facility: OTHER | Age: 57
End: 2022-06-21

## 2022-06-21 NOTE — TELEPHONE ENCOUNTER
Upon review of the In Basket request we were able to locate, review, and update the patient chart as requested for Diabetic Eye Exam     Any additional questions or concerns should be emailed to the Practice Liaisons via [email or web address] email, please do not reply via In Basket      Thank you  Ella Wise

## 2022-06-21 NOTE — TELEPHONE ENCOUNTER
----- Message from Ja Dolan sent at 6/21/2022 10:20 AM EDT -----  Regarding: Kathy Norwood request  06/21/22 10:20 AM    Hello, our patient Handy Velasco  has had Diabetic Eye Exam completed/performed  Please assist in updating the patient chart by pulling the document from the Media Tab  The date of service is 1/17/2022       Thank you,  [address] [place] [address]

## 2022-06-26 ENCOUNTER — APPOINTMENT (EMERGENCY)
Dept: CT IMAGING | Facility: HOSPITAL | Age: 57
End: 2022-06-26
Payer: COMMERCIAL

## 2022-06-26 ENCOUNTER — HOSPITAL ENCOUNTER (EMERGENCY)
Facility: HOSPITAL | Age: 57
Discharge: HOME | End: 2022-06-26
Attending: EMERGENCY MEDICINE
Payer: COMMERCIAL

## 2022-06-26 VITALS
OXYGEN SATURATION: 100 % | SYSTOLIC BLOOD PRESSURE: 142 MMHG | BODY MASS INDEX: 32.14 KG/M2 | HEART RATE: 74 BPM | TEMPERATURE: 98.4 F | RESPIRATION RATE: 18 BRPM | DIASTOLIC BLOOD PRESSURE: 84 MMHG | WEIGHT: 224 LBS

## 2022-06-26 DIAGNOSIS — R07.89 ATYPICAL CHEST PAIN: Primary | ICD-10-CM

## 2022-06-26 DIAGNOSIS — R06.00 DYSPNEA: ICD-10-CM

## 2022-06-26 LAB
2HR DELTA HS TROPONIN: -6 NG/L
ALBUMIN SERPL BCP-MCNC: 3.5 G/DL (ref 3.5–5)
ALP SERPL-CCNC: 47 U/L (ref 46–116)
ALT SERPL W P-5'-P-CCNC: 25 U/L (ref 12–78)
ANION GAP SERPL CALCULATED.3IONS-SCNC: 6 MMOL/L (ref 4–13)
AST SERPL W P-5'-P-CCNC: 23 U/L (ref 5–45)
ATRIAL RATE: 77 BPM
ATRIAL RATE: 84 BPM
BASOPHILS # BLD AUTO: 0.04 THOUSANDS/ΜL (ref 0–0.1)
BASOPHILS NFR BLD AUTO: 1 % (ref 0–1)
BILIRUB SERPL-MCNC: 0.62 MG/DL (ref 0.2–1)
BUN SERPL-MCNC: 31 MG/DL (ref 5–25)
CALCIUM SERPL-MCNC: 8.5 MG/DL (ref 8.3–10.1)
CARDIAC TROPONIN I PNL SERPL HS: 61 NG/L
CARDIAC TROPONIN I PNL SERPL HS: 67 NG/L
CHLORIDE SERPL-SCNC: 98 MMOL/L (ref 100–108)
CO2 SERPL-SCNC: 35 MMOL/L (ref 21–32)
CREAT SERPL-MCNC: 7.78 MG/DL (ref 0.6–1.3)
D DIMER PPP FEU-MCNC: 1.78 UG/ML FEU
EOSINOPHIL # BLD AUTO: 0.23 THOUSAND/ΜL (ref 0–0.61)
EOSINOPHIL NFR BLD AUTO: 4 % (ref 0–6)
ERYTHROCYTE [DISTWIDTH] IN BLOOD BY AUTOMATED COUNT: 14.1 % (ref 11.6–15.1)
GFR SERPL CREATININE-BSD FRML MDRD: 6 ML/MIN/1.73SQ M
GLUCOSE SERPL-MCNC: 121 MG/DL (ref 65–140)
HCT VFR BLD AUTO: 28.4 % (ref 36.5–49.3)
HGB BLD-MCNC: 9.4 G/DL (ref 12–17)
IMM GRANULOCYTES # BLD AUTO: 0.01 THOUSAND/UL (ref 0–0.2)
IMM GRANULOCYTES NFR BLD AUTO: 0 % (ref 0–2)
LYMPHOCYTES # BLD AUTO: 1.56 THOUSANDS/ΜL (ref 0.6–4.47)
LYMPHOCYTES NFR BLD AUTO: 29 % (ref 14–44)
MCH RBC QN AUTO: 37 PG (ref 26.8–34.3)
MCHC RBC AUTO-ENTMCNC: 33.1 G/DL (ref 31.4–37.4)
MCV RBC AUTO: 112 FL (ref 82–98)
MONOCYTES # BLD AUTO: 0.69 THOUSAND/ΜL (ref 0.17–1.22)
MONOCYTES NFR BLD AUTO: 13 % (ref 4–12)
NEUTROPHILS # BLD AUTO: 2.93 THOUSANDS/ΜL (ref 1.85–7.62)
NEUTS SEG NFR BLD AUTO: 53 % (ref 43–75)
NRBC BLD AUTO-RTO: 0 /100 WBCS
NT-PROBNP SERPL-MCNC: ABNORMAL PG/ML
P AXIS: 39 DEGREES
P AXIS: 48 DEGREES
PLATELET # BLD AUTO: 172 THOUSANDS/UL (ref 149–390)
PMV BLD AUTO: 10.3 FL (ref 8.9–12.7)
POTASSIUM SERPL-SCNC: 4.7 MMOL/L (ref 3.5–5.3)
PR INTERVAL: 202 MS
PR INTERVAL: 204 MS
PROT SERPL-MCNC: 8 G/DL (ref 6.4–8.2)
QRS AXIS: 67 DEGREES
QRS AXIS: 78 DEGREES
QRSD INTERVAL: 90 MS
QRSD INTERVAL: 98 MS
QT INTERVAL: 368 MS
QT INTERVAL: 394 MS
QTC INTERVAL: 416 MS
QTC INTERVAL: 465 MS
RBC # BLD AUTO: 2.54 MILLION/UL (ref 3.88–5.62)
SODIUM SERPL-SCNC: 139 MMOL/L (ref 136–145)
T WAVE AXIS: 180 DEGREES
T WAVE AXIS: 260 DEGREES
VENTRICULAR RATE: 77 BPM
VENTRICULAR RATE: 84 BPM
WBC # BLD AUTO: 5.46 THOUSAND/UL (ref 4.31–10.16)

## 2022-06-26 PROCEDURE — 85379 FIBRIN DEGRADATION QUANT: CPT | Performed by: EMERGENCY MEDICINE

## 2022-06-26 PROCEDURE — 36415 COLL VENOUS BLD VENIPUNCTURE: CPT | Performed by: EMERGENCY MEDICINE

## 2022-06-26 PROCEDURE — 85025 COMPLETE CBC W/AUTO DIFF WBC: CPT | Performed by: EMERGENCY MEDICINE

## 2022-06-26 PROCEDURE — 71275 CT ANGIOGRAPHY CHEST: CPT

## 2022-06-26 PROCEDURE — 93010 ELECTROCARDIOGRAM REPORT: CPT | Performed by: INTERNAL MEDICINE

## 2022-06-26 PROCEDURE — 83880 ASSAY OF NATRIURETIC PEPTIDE: CPT | Performed by: EMERGENCY MEDICINE

## 2022-06-26 PROCEDURE — 99285 EMERGENCY DEPT VISIT HI MDM: CPT

## 2022-06-26 PROCEDURE — 80053 COMPREHEN METABOLIC PANEL: CPT | Performed by: EMERGENCY MEDICINE

## 2022-06-26 PROCEDURE — 99285 EMERGENCY DEPT VISIT HI MDM: CPT | Performed by: EMERGENCY MEDICINE

## 2022-06-26 PROCEDURE — G1004 CDSM NDSC: HCPCS

## 2022-06-26 PROCEDURE — 84484 ASSAY OF TROPONIN QUANT: CPT | Performed by: EMERGENCY MEDICINE

## 2022-06-26 PROCEDURE — 93005 ELECTROCARDIOGRAM TRACING: CPT

## 2022-06-26 RX ADMIN — IOHEXOL 85 ML: 350 INJECTION, SOLUTION INTRAVENOUS at 13:46

## 2022-06-26 NOTE — ED NOTES
Pt insisting on US guided IV placement, RN attempted and vessel ruptured  Barrett Du RN asked to attempt IV at this time        Ebenezer Aguirre RN  06/26/22 6541

## 2022-06-26 NOTE — DISCHARGE INSTRUCTIONS
Continue to attend dialysis as scheduled  Follow up with your Nephrologist and Cardiologist to further discuss your symptoms  Return to the ED if the chest pain or shortness of breath worsens

## 2022-06-26 NOTE — ED PROVIDER NOTES
History  Chief Complaint   Patient presents with    Chest Pain     Reports chest pain longer then 2 weeks worse with activity also SOB with exertion      A 62 yo male with pmhx of CAD, CKD, DM, HTN, ESRD on HD (MWF), Vtach s/p ICD; presents with chest pain and shortness of breath that has occurred intermittently for the several weeks  Pt believes symptoms began after his most recent admission for COVID (May 18-20)  Pt states SOB occurs when he lays flat and also with exertion, chest pain occurs intermittently however has also been occurring during dialysis  Chest pain is described as a heaviness  Pt expresses concern that they may be taking too much fluid off at dialysis  Pt did discuss his symptoms with his nephrologist who sent him for a CXR, see results below  Due to persistent symptoms, pt decided to come to the ED for evaluation  Pt otherwise denies fevers, chills, cough, abd pain, N/V/D, peripheral edema and rashes  Pt states he has been compliant with his medications and dialysis  A/P: Dyspnea, associated with chest heaviness  Pt overall resting comfortably, lungs CTAB  Pt appears euvolemic  Will obtain labs and EKG  Given that symptoms began after his admission for COVID, will obtain dimer to evaluate for possible pulmonary embolism  History provided by:  Patient and medical records  Chest Pain  Associated symptoms: shortness of breath      CXR at [facility] (6/21)  1 ) Resolution previously visualized right upper lobe infiltrate  2 ) Minimal right lower lobe segmental atelectasis  3 ) Cardiomegaly  Left-sided AICD is again seen in place  No evidence of acute pleural or parenchymal disease  Wt Readings from Last 3 Encounters:   06/26/22 102 kg (224 lb)   05/20/22 102 kg (224 lb 10 4 oz)   03/08/22 107 kg (235 lb)     Prior to Admission Medications   Prescriptions Last Dose Informant Patient Reported? Taking?    Adhesive Bandages MISC Past Month at Unknown time Self No Yes   Sig: by Does not apply route 3 (three) times a day   Alcohol Swabs (ALCOHOL PREP) PADS  Self No No   Sig: by Does not apply route 3 (three) times a day   Blood Glucose Monitoring Suppl KIT 6/26/2022 at Unknown time Self No Yes   Sig: by Does not apply route 3 (three) times a day ONE TOUCH DEVISE  TEST BLOOD SUGARS 3 TIMES DAILY   Lancets (ONETOUCH ULTRASOFT) lancets 6/26/2022 at Unknown time Self No Yes   Sig: Test tid   albuterol (PROVENTIL HFA,VENTOLIN HFA) 90 mcg/act inhaler Past Week at Unknown time Self Yes Yes   Sig: Inhale 2 puffs every 6 (six) hours as needed   aspirin 81 MG tablet 6/26/2022 at Unknown time Self Yes Yes   Sig: Take 81 mg by mouth daily   atorvastatin (LIPITOR) 20 mg tablet 6/25/2022 at Unknown time  No Yes   Sig: take 1 tablet by mouth once daily   glucose blood test strip 6/26/2022 at Unknown time Self No Yes   Sig: Tests tid   lidocaine-prilocaine (EMLA) cream Past Week at Unknown time Self Yes Yes   Sig: APPLY 1/2 HOUR PRIOR TO DIALYSIS AS DIRECTED   metoprolol succinate (TOPROL-XL) 50 mg 24 hr tablet 6/25/2022 at Unknown time Self Yes Yes   Sig: Take 50 mg by mouth in the morning  Facility-Administered Medications: None       Past Medical History:   Diagnosis Date    Acute osteomyel[facility] of metatarsal bone of left foot [facility] ) 6/13/2016    Anemia     BMI 37 0-37 9, adult 10/9/2018    CAD in native artery     Cellulitis of foot, left 2/2/2016    Chronic kidney disease     Depression     Diabetes mellitus ([facility] )     History of implantable cardioverter-defibrillator (ICD) placement     [employer]    Hypertension     Myocardial infarct, old 2006    in setting of cocaine use   Obesity     Peripheral neuropathy     Severe obesity (BMI 35 0-39  9) with comorbidity ([facility] ) 8/30/2016    Stage 4 chronic kidney disease ([facility] ) 12/13/2018    Toe osteomyelitis, right ([facility] ) 1/17/2018    Added automatically from request for surgery [id number]    Ventricular tachycardia [facility])        Past Surgical History: Procedure Laterality Date    APPENDECTOMY      CARDIAC DEFIBRILLATOR PLACEMENT      MASTECTOMY FOR GYNECOMASTIA  2015    REDUCTION MAMMAPLASTY  2015    TOE AMPUTATION      Hallux amputation    TOE AMPUTATION Right 2018    Procedure: AMPUTATION 2ND TOE;  Surgeon: Constance Betts DPM;  Location: [facility];  Service: Podiatry    WOUND DEBRIDEMENT Left 2016    Procedure: DEBRIDEMENT FOOT/TOE [facility] OUT); Transmetatasral vs Lisfranc amputation , bone biopsy,;  Surgeon: Constance Betts DPM;  Location: [facility];  Service:        Family History   Adopted: Yes     I have reviewed and agree with the history as documented  E-Cigarette/Vaping    E-Cigarette Use Never User      E-Cigarette/Vaping Substances    Nicotine No     THC No     CBD No     Flavoring No     Other No     Unknown No      Social History     Tobacco Use    Smoking status: Former Smoker     Packs/day: 1 00     Years: 18      Pack years: 18      Types: Cigarettes     Start date:      Quit date:      Years since quittin 5    Smokeless tobacco: Former User    Tobacco comment: quite smoking    Vaping Use    Vaping Use: Never used   Substance Use Topics    Alcohol use: Not Currently    Drug use: Not Currently     Frequency: 1 0 times per week     Types: Marijuana       Review of Systems   Respiratory: Positive for shortness of breath  Cardiovascular: Positive for chest pain  All other systems reviewed and are negative  Physical Exam  Physical Exam  General Appearance: alert and oriented, nad, non toxic appearing  Skin:  Warm, dry, intact  No cyanosis  HEENT: Atraumatic, normocephalic  No eye drainage  Normal hearing  Moist mucous membranes  Neck: Supple, trachea midline  Cardiac: RRR; no murmurs, rub, gallops    Trace pedal edema, 2+ pulses  Pulmonary: lungs CTAB; no wheezes, rales, rhonchi  Gastrointestinal: abdomen soft, nontender, nondistended; no guarding or rebound tenderness; good bowel sounds, no mass or bruits  Extremities:  No deformities    No calf tenderness, no clubbing  Neuro:  no focal motor or sensory deficits, CN 2-12 grossly intact  Psych:  Normal mood and affect, normal judgement and insight      Vital Signs  ED Triage Vitals [06/26/22 1200]   Temperature Pulse Respirations Blood Pressure SpO2   98 4 °F (36 9 °C) 86 20 154/91 100 %      Temp Source Heart Rate Source Patient Position - Orthostatic VS BP Location FiO2 (%)   Oral Monitor Lying Right arm --      Pain Score       10 - Worst Possible Pain           Vitals:    06/26/22 1200 06/26/22 1330 06/26/22 1445   BP: 154/91 140/85 142/84   Pulse: 86 74 74   Patient Position - Orthostatic VS: Lying Lying Lying         Visual Acuity      ED Medications  Medications   iohexol (OMNIPAQUE) 350 MG/ML injection (SINGLE-DOSE) 85 mL (85 mL Intravenous Given 6/26/22 1346)       Diagnostic Studies  Results Reviewed     Procedure Component Value Units Date/Time    HS Troponin I 2hr [[id number]]  (Abnormal) Collected: 06/26/22 1447    Lab Status: Final result Specimen: Blood from Arm, Right Updated: 06/26/22 1523     hs TnI 2hr 61 ng/L      Delta 2hr hsTnI -6 ng/L     NT-BNP PRO [[id number]]  (Abnormal) Collected: 06/26/22 1253    Lab Status: Final result Specimen: Blood from Arm, Right Updated: 06/26/22 1413     NT-proBNP 83,331 pg/mL     HS Troponin 0hr (reflex protocol) [[id number]]  (Abnormal) Collected: 06/26/22 1253    Lab Status: Final result Specimen: Blood from Arm, Right Updated: 06/26/22 1325     hs TnI 0hr 67 ng/L     Comprehensive metabolic panel [[id number]]  (Abnormal) Collected: 06/26/22 1253    Lab Status: Final result Specimen: Blood from Arm, Right Updated: 06/26/22 1318     Sodium 139 mmol/L      Potassium 4 7 mmol/L      Chloride 98 mmol/L      CO2 35 mmol/L      ANION GAP 6 mmol/L      BUN 31 mg/dL      Creatinine 7 78 mg/dL      Glucose 121 mg/dL      Calcium 8 5 mg/dL      AST 23 U/L      ALT 25 U/L      Alkaline Phosphatase 47 U/L      Total Protein 8 0 g/dL      Albumin 3 5 g/dL      Total Bilirubin 0 62 mg/dL      eGFR 6 ml/min/1 73sq m     Narrative:      [place] guidelines for Chronic Kidney Disease (CKD):     Stage 1 with normal or high GFR (GFR > 90 mL/min/1 73 square meters)    Stage 2 Mild CKD (GFR = 60-89 mL/min/1 73 square meters)    Stage 3A Moderate CKD (GFR = 45-59 mL/min/1 73 square meters)    Stage 3B Moderate CKD (GFR = 30-44 mL/min/1 73 square meters)    Stage 4 Severe CKD (GFR = 15-29 mL/min/1 73 square meters)    Stage 5 End Stage CKD (GFR <15 mL/min/1 73 square meters)  Note: GFR calculation is accurate only with a steady state creatinine    D-Dimer [[id number]]  (Abnormal) Collected: 06/26/22 1254    Lab Status: Final result Specimen: Blood from Arm, Right Updated: 06/26/22 1318     D-Dimer, Quant 1 78 ug/ml FEU     Narrative: In the evaluation for possible pulmonary embolism, in the appropriate (Well's Score of 4 or less) patient, the age adjusted d-dimer cutoff for this patient can be calculated as:    Age x 0 01 (in ug/mL) for Age-adjusted D-dimer exclusion threshold for a patient over 50 years      CBC and differential [[id number]]  (Abnormal) Collected: 06/26/22 1253    Lab Status: Final result Specimen: Blood from Arm, Right Updated: 06/26/22 1301     WBC 5 46 Thousand/uL      RBC 2 54 Million/uL      Hemoglobin 9 4 g/dL      Hematocrit 28 4 %       fL      MCH 37 0 pg      MCHC 33 1 g/dL      RDW 14 1 %      MPV 10 3 fL      Platelets 469 Thousands/uL      nRBC 0 /100 WBCs      Neutrophils Relative 53 %      Immat GRANS % 0 %      Lymphocytes Relative 29 %      Monocytes Relative 13 %      Eosinophils Relative 4 %      Basophils Relative 1 %      Neutrophils Absolute 2 93 Thousands/µL      Immature Grans Absolute 0 01 Thousand/uL      Lymphocytes Absolute 1 56 Thousands/µL      Monocytes Absolute 0 69 Thousand/µL      Eosinophils Absolute 0 23 Thousand/µL Basophils Absolute 0 04 Thousands/µL                  CTA ED chest PE study   Final Result by Daniel Saucedo MD (06/26 1415)      Mild cardiomegaly  Some streaky and groundglass densities identified in the lower lobes could relate to atelectasis, pulmonary edema or infection such as pneumonia if clinically suspected  No evidence of pulmonary embolism                  Workstation performed: [id number]                    Procedures  Procedures   ECG 12 Lead Documentation  Date/Time: today/date: 6/29/2022  Performed by: Duong Sawyer    ECG reviewed by me, the ED Provider: yes    Patient location:  ED   Previous ECG:  Compared to current, no change   Rate:  84  ECG rate assessment: normal    Rhythm: sinus rhythm    Ectopy:  PVC's    QRS axis:  Normal  Intervals: normal   Q waves: None   ST segments:  Normal  T waves: nonspecific changes      Impression: NSR with PVC's and nonspecific T wave changes (unchanged from previous)        ED Course  ED Course as of 06/29/22 1318   Sun Jun 26, 2022   1321 D-Dimer, Quant(!): 1 78  Will proceed with CT PE   1322 Creatinine(!): 7 78  Pt is on dialysis, due tomorrow   1327 hs TnI 0hr(!): 67  No associated ST changes   1420 CTA ED chest PE study  1 ) Mild cardiomegaly  2 ) Some streaky and groundglass densities identified in the lower lobes could relate to atelectasis, pulmonary edema or infection such as pneumonia if clinically suspected  3 ) No evidence of pulmonary embolism    Doubt PNA based on symptoms, low suspicion for acute CHF as patient overall appears euvolemic on exam   Possible due to recent COVID infection  1524 hs TnI 2hr(!): 61  Delta -6  On review of records, pt's troponin is chronically positive likely due to underlying ESRD  1532 Pt updated on work up  Remains asymptomatic at this time    Given prolonged course of symptoms (over past 2-3) and reassuring evaluation today, will plan to discharge home with close OP follow up with nephro and cardiology  Pt in agreement with this plan  Will obtain ambulatory pulse ox prior to discharge  1539 Ambulatory pulse ox 98% on room air, HR in the 80's  Pt remained asymptomatic  HEART Risk Score    Flowsheet Row Most Recent Value   Heart Score Risk Calculator    History 0 Filed at: 06/26/2022 1405   ECG 1 Filed at: 06/26/2022 1405   Age 1 Filed at: 06/26/2022 1405   Risk Factors 2 Filed at: 06/26/2022 1405   Troponin 2 Filed at: 06/26/2022 1405   HEART Score 6 Filed at: 06/26/2022 1405                        SBIRT 20yo+    Flowsheet Row Most Recent Value   SBIRT (23 yo +)    In order to provide better care to our patients, we are screening all of our patients for alcohol and drug use  Would it be okay to ask you these screening questions? No Filed at: 06/26/2022 1336                    MDM    Disposition  Final diagnoses:   Atypical chest pain   Dyspnea     Time reflects when diagnosis was documented in both MDM as applicable and the Disposition within this note     Time User Action Codes Description Comment    6/26/2022  3:40 PM Ester Hua Add [R07 89] Atypical chest pain     6/26/2022  3:40 PM Ester Hua Add [R06 00] Dyspnea       ED Disposition     ED Disposition   Discharge    Condition   Stable    Date/Time   Sun Jun 26, 2022  3:40 PM    [address]  discharge to home/self care  Follow-up Information     Follow up With Specialties Details Why Contact Info Additional Information    Robert Luz MD Family Medicine Schedule an appointment as soon as possible for a visit  For re-evaluation [address] [facility] [address] [facility] Emergency Department Emergency Medicine Go to  If symptoms worsen [facility] [address]  [address] [place] [address] Emergency Department, [address] [facility] , [place], [place], [address]          Discharge Medication List as of 6/26/2022  3:41 PM      CONTINUE these medications which have NOT CHANGED    Details   Adhesive Bandages MISC by Does not apply route 3 (three) times a day, Starting Wed 5/6/2020, Normal      albuterol (PROVENTIL HFA,VENTOLIN HFA) 90 mcg/act inhaler Inhale 2 puffs every 6 (six) hours as needed, Starting Sun 2/27/2022, Historical Med      aspirin 81 MG tablet Take 81 mg by mouth daily, Historical Med      atorvastatin (LIPITOR) 20 mg tablet take 1 tablet by mouth once daily, Normal      Blood Glucose Monitoring Suppl KIT by Does not apply route 3 (three) times a day ONE TOUCH DEVISE  TEST BLOOD SUGARS 3 TIMES DAILY, Starting Wed 5/6/2020, Print      glucose blood test strip Tests tid, Normal      Lancets (ONETOUCH ULTRASOFT) lancets Test tid, Normal      lidocaine-prilocaine (EMLA) cream APPLY 1/2 HOUR PRIOR TO DIALYSIS AS DIRECTED, Historical Med      metoprolol succinate (TOPROL-XL) 50 mg 24 hr tablet Take 50 mg by mouth in the morning , Starting Tue 3/29/2022, Historical Med      Alcohol Swabs (ALCOHOL PREP) PADS by Does not apply route 3 (three) times a day, Starting Wed 5/6/2020, Normal             No discharge procedures on file      PDMP Review     None          ED Provider  Electronically Signed by           Vanesa Fraga DO  06/29/22 [id number]

## 2022-07-07 ENCOUNTER — APPOINTMENT (OUTPATIENT)
Dept: LAB | Facility: HOSPITAL | Age: 57
End: 2022-07-07
Payer: COMMERCIAL

## 2022-07-07 DIAGNOSIS — E11.621 TYPE 2 DIABETES MELLITUS WITH FOOT ULCER, WITHOUT LONG-TERM CURRENT USE OF INSULIN (HCC): ICD-10-CM

## 2022-07-07 DIAGNOSIS — L97.509 TYPE 2 DIABETES MELLITUS WITH FOOT ULCER, WITHOUT LONG-TERM CURRENT USE OF INSULIN (HCC): ICD-10-CM

## 2022-07-07 DIAGNOSIS — E78.5 DYSLIPIDEMIA, GOAL LDL BELOW 70: ICD-10-CM

## 2022-07-07 DIAGNOSIS — J12.3 PNEUMONIA DUE TO HUMAN METAPNEUMOVIRUS (HMPV): ICD-10-CM

## 2022-07-07 DIAGNOSIS — I10 PRIMARY HYPERTENSION: ICD-10-CM

## 2022-07-07 LAB
ANION GAP SERPL CALCULATED.3IONS-SCNC: 15 MMOL/L (ref 5–14)
BASOPHILS # BLD AUTO: 0.06 THOUSANDS/ΜL (ref 0–0.1)
BASOPHILS NFR BLD AUTO: 1 % (ref 0–1)
BUN SERPL-MCNC: 17 MG/DL (ref 5–25)
CALCIUM SERPL-MCNC: 8.9 MG/DL (ref 8.4–10.2)
CHLORIDE SERPL-SCNC: 95 MMOL/L (ref 97–108)
CHOLEST SERPL-MCNC: 122 MG/DL
CO2 SERPL-SCNC: 31 MMOL/L (ref 22–30)
CREAT SERPL-MCNC: 5.76 MG/DL (ref 0.7–1.5)
EOSINOPHIL # BLD AUTO: 0.33 THOUSAND/ΜL (ref 0–0.61)
EOSINOPHIL NFR BLD AUTO: 6 % (ref 0–6)
ERYTHROCYTE [DISTWIDTH] IN BLOOD BY AUTOMATED COUNT: 14.2 % (ref 11.6–15.1)
EST. AVERAGE GLUCOSE BLD GHB EST-MCNC: 100 MG/DL
GFR SERPL CREATININE-BSD FRML MDRD: 9 ML/MIN/1.73SQ M
GLUCOSE P FAST SERPL-MCNC: 161 MG/DL (ref 70–99)
HBA1C MFR BLD: 5.1 %
HCT VFR BLD AUTO: 35.5 % (ref 36.5–49.3)
HDLC SERPL-MCNC: 34 MG/DL
HGB BLD-MCNC: 11.2 G/DL (ref 12–17)
IMM GRANULOCYTES # BLD AUTO: 0.03 THOUSAND/UL (ref 0–0.2)
IMM GRANULOCYTES NFR BLD AUTO: 1 % (ref 0–2)
LDLC SERPL CALC-MCNC: 66 MG/DL
LYMPHOCYTES # BLD AUTO: 2.03 THOUSANDS/ΜL (ref 0.6–4.47)
LYMPHOCYTES NFR BLD AUTO: 35 % (ref 14–44)
MCH RBC QN AUTO: 35.9 PG (ref 26.8–34.3)
MCHC RBC AUTO-ENTMCNC: 31.5 G/DL (ref 31.4–37.4)
MCV RBC AUTO: 114 FL (ref 82–98)
MONOCYTES # BLD AUTO: 1.04 THOUSAND/ΜL (ref 0.17–1.22)
MONOCYTES NFR BLD AUTO: 18 % (ref 4–12)
NEUTROPHILS # BLD AUTO: 2.4 THOUSANDS/ΜL (ref 1.85–7.62)
NEUTS SEG NFR BLD AUTO: 39 % (ref 43–75)
NONHDLC SERPL-MCNC: 88 MG/DL
NRBC BLD AUTO-RTO: 0 /100 WBCS
PLATELET # BLD AUTO: 169 THOUSANDS/UL (ref 149–390)
PMV BLD AUTO: 11.1 FL (ref 8.9–12.7)
POTASSIUM SERPL-SCNC: 3.9 MMOL/L (ref 3.6–5)
RBC # BLD AUTO: 3.12 MILLION/UL (ref 3.88–5.62)
SODIUM SERPL-SCNC: 141 MMOL/L (ref 137–147)
TRIGL SERPL-MCNC: 111 MG/DL
WBC # BLD AUTO: 5.89 THOUSAND/UL (ref 4.31–10.16)

## 2022-07-07 PROCEDURE — 80048 BASIC METABOLIC PNL TOTAL CA: CPT

## 2022-07-07 PROCEDURE — 36415 COLL VENOUS BLD VENIPUNCTURE: CPT

## 2022-07-07 PROCEDURE — 83036 HEMOGLOBIN GLYCOSYLATED A1C: CPT

## 2022-07-07 PROCEDURE — 80061 LIPID PANEL: CPT

## 2022-07-07 PROCEDURE — 85025 COMPLETE CBC W/AUTO DIFF WBC: CPT

## 2022-07-08 ENCOUNTER — TELEPHONE (OUTPATIENT)
Dept: ADMINISTRATIVE | Facility: OTHER | Age: 57
End: 2022-07-08

## 2022-07-08 NOTE — TELEPHONE ENCOUNTER
----- Message from Samantha Alarcon sent at 7/8/2022 10:23 AM EDT -----  Regarding: Ishaan Wu request  07/08/22 10:23 AM    Hello, our patient Enoch Dennis  has had Diabetic Foot Exam completed/performed  Please assist in updating the patient chart by pulling the document from the Media Tab  The date of service is 7/7/2022       Thank you,  [id number] [employer] [place] [phone]

## 2022-07-12 ENCOUNTER — OFFICE VISIT (OUTPATIENT)
Dept: FAMILY MEDICINE CLINIC | Facility: CLINIC | Age: 57
End: 2022-07-12
Payer: COMMERCIAL

## 2022-07-12 VITALS
BODY MASS INDEX: 34.5 KG/M2 | RESPIRATION RATE: 16 BRPM | WEIGHT: 241 LBS | HEIGHT: 70 IN | HEART RATE: 60 BPM | TEMPERATURE: 98.5 F | OXYGEN SATURATION: 97 % | DIASTOLIC BLOOD PRESSURE: 70 MMHG | SYSTOLIC BLOOD PRESSURE: 134 MMHG

## 2022-07-12 DIAGNOSIS — I42.0 ISCHEMIC DILATED CARDIOMYOPATHY (HCC): ICD-10-CM

## 2022-07-12 DIAGNOSIS — I10 PRIMARY HYPERTENSION: ICD-10-CM

## 2022-07-12 DIAGNOSIS — E11.42 DIABETIC POLYNEUROPATHY ASSOCIATED WITH TYPE 2 DIABETES MELLITUS (HCC): Primary | ICD-10-CM

## 2022-07-12 DIAGNOSIS — N52.8 OTHER MALE ERECTILE DYSFUNCTION: ICD-10-CM

## 2022-07-12 DIAGNOSIS — I50.22 CHRONIC SYSTOLIC CHF (CONGESTIVE HEART FAILURE) (HCC): ICD-10-CM

## 2022-07-12 DIAGNOSIS — E78.5 DYSLIPIDEMIA, GOAL LDL BELOW 70: ICD-10-CM

## 2022-07-12 DIAGNOSIS — I25.5 ISCHEMIC DILATED CARDIOMYOPATHY (HCC): ICD-10-CM

## 2022-07-12 LAB
CREAT UR-MCNC: 286 MG/DL
MICROALBUMIN UR-MCNC: 503 MG/L (ref 0–20)
MICROALBUMIN/CREAT 24H UR: 176 MG/G CREATININE (ref 0–30)

## 2022-07-12 PROCEDURE — 82043 UR ALBUMIN QUANTITATIVE: CPT | Performed by: FAMILY MEDICINE

## 2022-07-12 PROCEDURE — 82570 ASSAY OF URINE CREATININE: CPT | Performed by: FAMILY MEDICINE

## 2022-07-12 PROCEDURE — 99214 OFFICE O/P EST MOD 30 MIN: CPT | Performed by: FAMILY MEDICINE

## 2022-07-14 PROBLEM — N52.8 OTHER MALE ERECTILE DYSFUNCTION: Status: ACTIVE | Noted: 2022-07-14

## 2022-07-14 PROBLEM — N52.8 OTHER MALE ERECTILE DYSFUNCTION: Status: ACTIVE | Noted: 2022-07-12

## 2022-07-14 NOTE — ASSESSMENT & PLAN NOTE
Chronic asymptomatic fair control encouraged patient to continue with the metoprolol 50 mg once a day low salt diet important lose weight review with the patient

## 2022-07-14 NOTE — ASSESSMENT & PLAN NOTE
Asymptomatic discussed with the patient he had multiple factor affecting his sexual dysfunction including diabetic chronic kidney disease vascular path recommend to the patient to be evaluated by endocrinology

## 2022-07-14 NOTE — PROGRESS NOTES
Subjective:   Chief Complaint   Patient presents with    Follow-up     Chronic conditions        Patient ID: Da Cedillo  is a 61 y o  male  Patient here follow-up with a chronic condition he is concerned about erectile dysfunction he having problem to maintain erecion to s satisfy sexual activity no trauma no pain no bleeding recent blood work review with the patient      The following portions of the patient's history were reviewed and updated as appropriate: allergies, current medications, past family history, past medical history, past social history, past surgical history and problem list     Review of Systems   Constitutional: Negative for activity change, appetite change and fever  HENT: Negative for congestion, ear pain, sinus pressure, sinus pain and sore throat  Eyes: Negative for pain, discharge, redness and itching  Respiratory: Negative for cough, chest tightness, shortness of breath and stridor  Cardiovascular: Negative for chest pain, palpitations and leg swelling  Gastrointestinal: Negative for abdominal pain, blood in stool, constipation, diarrhea and nausea  Genitourinary: Negative for dysuria, flank pain, frequency and hematuria  Erectile dysfunction   Musculoskeletal: Negative for back pain, joint swelling and neck pain  Skin: Negative for pallor and rash  Neurological: Negative for dizziness, tremors, weakness, numbness and headaches  Hematological: Does not bruise/bleed easily  Objective:  Vitals:    07/12/22 1019   BP: 134/70   BP Location: Left arm   Patient Position: Sitting   Cuff Size: Large   Pulse: 60   Resp: 16   Temp: 98 5 °F (36 9 °C)   TempSrc: Tympanic   SpO2: 97%   Weight: 109 kg (241 lb)   Height: 5' 10" (1 778 m)      Physical Exam  Vitals and nursing note reviewed  Constitutional:       General: He is not in acute distress  Appearance: He is well-developed  He is not diaphoretic  HENT:      Head: Normocephalic  Right Ear: External ear normal       Left Ear: External ear normal    Eyes:      General:         Right eye: No discharge  Left eye: No discharge  Conjunctiva/sclera: Conjunctivae normal    Neck:      Vascular: No JVD  Cardiovascular:      Rate and Rhythm: Normal rate and regular rhythm  Heart sounds: Normal heart sounds  No murmur heard  No gallop  Pulmonary:      Effort: Pulmonary effort is normal  No respiratory distress  Breath sounds: Normal breath sounds  No stridor  No wheezing or rales  Chest:      Chest wall: No tenderness  Abdominal:      General: There is no distension  Palpations: Abdomen is soft  There is no mass  Tenderness: There is no abdominal tenderness  There is no rebound  Musculoskeletal:      Cervical back: Normal range of motion and neck supple  Lymphadenopathy:      Cervical: No cervical adenopathy  Skin:     General: Skin is warm  Findings: No erythema or rash  Neurological:      Mental Status: He is alert and oriented to person, place, and time             Assessment/Plan:    Diabetic neuropathy ([facility])    Lab Results   Component Value Date    HGBA1C 5 1 07/07/2022   A chronic hemoglobin A1c will control 5 1 on diet currently not on any medication encouraged patient to continue with the low carb diet and important lose weight patient does follow-up with podiatric for diabetic foot care and the Ophthalmology for diabetic eye exam    Hypertension  Chronic asymptomatic fair control encouraged patient to continue with the metoprolol 50 mg once a day low salt diet important lose weight review with the patient    Chronic systolic CHF (congestive heart failure) ([facility])  Wt Readings from Last 3 Encounters:   07/12/22 109 kg (241 lb)   06/26/22 102 kg (224 lb)   05/20/22 102 kg (224 lb 10 4 oz)         No sign of symptom of decompensation congestive heart failure patient does follow-up the with the Cardiology periodically he had also end-stage kidney disease on dialysis 3 times a week patient already on statin aspirin and beta-blocker    Ischemic dilated cardiomyopathy ([facility] )  Stable follow-up with the Cardiology periodically continue current management    Dyslipidemia, goal LDL below 70  Chronic asymptomatic LDL therapeutic in diabetic patient continue with the atorvastatin 20 mg once a day low-fat diet review with the patient    Other male erectile dysfunction  Asymptomatic discussed with the patient he had multiple factor affecting his sexual dysfunction including diabetic chronic kidney disease vascular path recommend to the patient to be evaluated by endocrinology       Diagnoses and all orders for this visit:    Diabetic polyneuropathy associated with type 2 diabetes mellitus ([facility] )  -     CBC and differential; Future  -     Basic metabolic panel; Future  -     Lipid Panel with Direct LDL reflex; Future  -     Hemoglobin A1C; Future  -     Microalbumin / creatinine urine ratio    Ischemic dilated cardiomyopathy (HCC)  -     CBC and differential; Future  -     Basic metabolic panel; Future  -     Lipid Panel with Direct LDL reflex; Future  -     Hemoglobin A1C; Future    Chronic systolic CHF (congestive heart failure) (HCC)  -     CBC and differential; Future  -     Basic metabolic panel; Future  -     Lipid Panel with Direct LDL reflex; Future  -     Hemoglobin A1C; Future    Other male erectile dysfunction  -     Ambulatory Referral to Endocrinology; Future    Primary hypertension  -     CBC and differential; Future  -     Basic metabolic panel; Future  -     Lipid Panel with Direct LDL reflex; Future  -     Hemoglobin A1C; Future    Dyslipidemia, goal LDL below 70  -     CBC and differential; Future  -     Basic metabolic panel; Future  -     Lipid Panel with Direct LDL reflex;  Future  -     Hemoglobin A1C; Future

## 2022-07-14 NOTE — ASSESSMENT & PLAN NOTE
Lab Results   Component Value Date    HGBA1C 5 1 07/07/2022   A chronic hemoglobin A1c will control 5 1 on diet currently not on any medication encouraged patient to continue with the low carb diet and important lose weight patient does follow-up with podiatric for diabetic foot care and the Ophthalmology for diabetic eye exam

## 2022-07-14 NOTE — ASSESSMENT & PLAN NOTE
Wt Readings from Last 3 Encounters:   07/12/22 109 kg (241 lb)   06/26/22 102 kg (224 lb)   05/20/22 102 kg (224 lb 10 4 oz)         No sign of symptom of decompensation congestive heart failure patient does follow-up the with the Cardiology periodically he had also end-stage kidney disease on dialysis 3 times a week patient already on statin aspirin and beta-blocker

## 2022-07-14 NOTE — ASSESSMENT & PLAN NOTE
Chronic asymptomatic LDL therapeutic in diabetic patient continue with the atorvastatin 20 mg once a day low-fat diet review with the patient

## 2022-08-25 ENCOUNTER — CONSULT (OUTPATIENT)
Dept: ENDOCRINOLOGY | Facility: CLINIC | Age: 57
End: 2022-08-25
Payer: COMMERCIAL

## 2022-08-25 VITALS
HEIGHT: 70 IN | WEIGHT: 248 LBS | DIASTOLIC BLOOD PRESSURE: 78 MMHG | HEART RATE: 66 BPM | SYSTOLIC BLOOD PRESSURE: 110 MMHG | BODY MASS INDEX: 35.5 KG/M2

## 2022-08-25 DIAGNOSIS — N25.81 SECONDARY HYPERPARATHYROIDISM OF RENAL ORIGIN (HCC): ICD-10-CM

## 2022-08-25 DIAGNOSIS — N52.8 OTHER MALE ERECTILE DYSFUNCTION: Primary | ICD-10-CM

## 2022-08-25 DIAGNOSIS — K76.0 NONALCOHOLIC FATTY LIVER DISEASE: ICD-10-CM

## 2022-08-25 DIAGNOSIS — R53.83 FATIGUE, UNSPECIFIED TYPE: ICD-10-CM

## 2022-08-25 PROCEDURE — 99244 OFF/OP CNSLTJ NEW/EST MOD 40: CPT | Performed by: INTERNAL MEDICINE

## 2022-08-25 NOTE — PROGRESS NOTES
Jasen Downey  61 y o  male MRN: [id number]    Encounter: [id number]     New Patient Consult Note      Assessment/Plan     Assessment: This is a 61y o -year-old male with erectile dysfunction  Plan:   Other male erectile dysfunction  - Obtain lab work for total and free testosterone, LH/FSH, prolactin, SHBG, estradiol, TSH, and free T4  - Discussed testosterone therapy and penile support devices during today's visit given comorbidities  Based upon lab work review in 4 weeks may consider urology evaluation for penile support device  He is agreeable  CC: Erectile dysfunction    History of Present Illness     HPI:  Kevin Mitchell  Is a 59-year-old male with a past medical history significant for ESRD-HD (MWF) with secondary hyperparathyroidism, type 2 diabetes complicated by polyneuropathy requiring toe amputations, ICM, V tach s/p ICD placement, hypertension, and dyslipidemia who was referred by his PCP, Suman Huffman MD, for an evaluation of his erectile dysfunction  He first began noticing erectile dysfunction over 10 years ago that occurs regularly  He takes Photos to [employer] supplements which he bought from [facility] for the past 3 years  He has difficulty initiating and maintaining and erection  He denies any history of liver disease, testosterone or steroid supplementation, testicular trauma or surgery, prostate problems, galactorrhea, headaches, visual disturbances  He endorses thinning scalp hair in the past which he now shaves every other day and that he has noticed testicular atrophy since he started dialysis  He used to have gynecomastia since puberty requiring mastectomy in 2019  He was on spironolactone around the time of his MI in 2006 but has not taken it for over 10 years  His highest weight was in the 280s when he was a teenager  He had gonorrhea during his youth which was treated successfully   He has occasional morning erections which he attributes to his Photos to [employer] supplementation  He used to smoke marijuana since age 14 but quit 6 months ago due to his cardiac history  He currently exercises at home by walking and recently completed cardiac rehabilitation  He is unaware of his family history as he is adopted  He is a former smoker who quit in 2006 and had a previous 3-pack-year history  He endorses social alcohol use  He has one healthy son  He is currently in a monogamous relationship and is sexually active  Review of Systems   Constitutional: Negative for chills and fever  HENT: Negative for ear pain and sore throat  Eyes: Negative for pain and visual disturbance  Respiratory: Negative for cough and shortness of breath  Cardiovascular: Negative for chest pain, palpitations and leg swelling  Gastrointestinal: Negative for abdominal pain, diarrhea, nausea and vomiting  Endocrine: Negative for cold intolerance, heat intolerance and polyuria  Genitourinary: Negative for dysuria, hematuria, penile discharge, penile pain, penile swelling, scrotal swelling and testicular pain  Musculoskeletal: Negative for arthralgias and back pain  Skin: Negative for color change and rash  Neurological: Positive for numbness  Negative for syncope and headaches  Psychiatric/Behavioral: Negative for confusion and dysphoric mood  All other systems reviewed and are negative  Historical Information   Past Medical History:   Diagnosis Date    Acute osteomyelitis of metatarsal bone of left foot ([facility] ) 6/13/2016    Anemia     BMI 37 0-37 9, adult 10/9/2018    CAD in native artery     Cellulitis of foot, left 2/2/2016    Chronic kidney disease     Depression     Diabetes mellitus ([address] )     History of implantable cardioverter-defibrillator (ICD) placement     Medtronic    Hypertension     Myocardial infarct, old 2006    in setting of cocaine use   Obesity     Peripheral neuropathy     Severe obesity (BMI 35 0-39  9) with comorbidity ([facility] ) 8/30/2016    Stage 4 chronic kidney disease ([facility] ) 2018    Toe osteomyelitis, right ([facility] ) 2018    Added automatically from request for surgery [id number]    Ventricular tachycardia [facility])      Past Surgical History:   Procedure Laterality Date    APPENDECTOMY      CARDIAC DEFIBRILLATOR PLACEMENT      MASTECTOMY FOR GYNECOMASTIA  2015    REDUCTION MAMMAPLASTY  2015    TOE AMPUTATION      Hallux amputation    TOE AMPUTATION Right 2018    Procedure: AMPUTATION 2ND TOE;  Surgeon: Abbi Meraz DPM;  [facility];  Service: Podiatry    WOUND DEBRIDEMENT Left 2016    Procedure: DEBRIDEMENT FOOT/TOE [facility]);  Transmetatasral vs Lisfranc amputation , bone biopsy,;  Surgeon: Abbi Meraz DPM;  [facility];  Service:      Social History   Social History     Substance and Sexual Activity   Alcohol Use Not Currently     Social History     Substance and Sexual Activity   Drug Use Not Currently    Frequency: 1 0 times per week    Types: Marijuana     Social History     Tobacco Use   Smoking Status Former Smoker    Packs/day:  00    Years:     Pack years: 18     Types: Cigarettes    Start date:     Quit date:     Years since quittin 6   Smokeless Tobacco Former User   Tobacco Comment    quite smoking      Family History:   Family History   Adopted: Yes       Meds/Allergies   Current Outpatient Medications   Medication Sig Dispense Refill    Adhesive Bandages MISC by Does not apply route 3 (three) times a day 30 each 5    albuterol (PROVENTIL HFA,VENTOLIN HFA) 90 mcg/act inhaler Inhale 2 puffs every 6 (six) hours as needed      Alcohol Swabs (ALCOHOL PREP) PADS by Does not apply route 3 (three) times a day 100 each 2    aspirin 81 MG tablet Take 81 mg by mouth daily      atorvastatin (LIPITOR) 20 mg tablet take 1 tablet by mouth once daily 30 tablet 2    Blood Glucose Monitoring Suppl KIT by Does not apply route 3 (three) times a day ONE [facility] DEVISE  TEST BLOOD SUGARS 3 TIMES DAILY 1 each 0    glucose blood test strip Tests tid 100 each 2    Lancets (ONETOUCH ULTRASOFT) lancets Test tid 100 each 2    lidocaine-prilocaine (EMLA) cream APPLY 1/2 HOUR PRIOR TO DIALYSIS AS DIRECTED      metoprolol succinate (TOPROL-XL) 50 mg 24 hr tablet Take 50 mg by mouth in the morning  No current facility-administered medications for this visit  Allergies   Allergen Reactions    Penicillins Hives and Other (See Comments)     Ancef TAKEN,Childhood  Other reaction(s): Hives       Objective   Vitals: There were no vitals taken for this visit  Physical Exam  Vitals reviewed  Constitutional:       Appearance: Normal appearance  HENT:      Head: Normocephalic and atraumatic  Mouth/Throat:      Mouth: Mucous membranes are moist       Pharynx: Oropharynx is clear  Eyes:      Extraocular Movements: Extraocular movements intact  Pupils: Pupils are equal, round, and reactive to light  Cardiovascular:      Rate and Rhythm: Normal rate and regular rhythm  Pulses: Normal pulses  Heart sounds: Normal heart sounds  Pulmonary:      Effort: Pulmonary effort is normal       Breath sounds: Normal breath sounds  No wheezing, rhonchi or rales  Abdominal:      General: Abdomen is flat  Bowel sounds are normal  There is no distension  Tenderness: There is no abdominal tenderness  Musculoskeletal:         General: No deformity  Normal range of motion  Cervical back: Normal range of motion and neck supple  No tenderness  Right lower leg: No edema  Left lower leg: No edema  Skin:     General: Skin is warm and dry  Neurological:      General: No focal deficit present  Mental Status: He is alert and oriented to person, place, and time         Lab Results:   Lab Results   Component Value Date/Time    Potassium 3 9 07/07/2022 10:24 AM    Potassium 4 7 06/26/2022 12:53 PM    Potassium 4 3 05/20/2022 03:55 PM    Chloride 95 (L) 07/07/2022 10:24 AM    Chloride 98 (L) 06/26/2022 12:53 PM    Chloride 97 (L) 05/20/2022 03:55 PM    CO2 31 (H) 07/07/2022 10:24 AM    CO2 35 (H) 06/26/2022 12:53 PM    CO2 22 05/20/2022 03:55 PM    BUN 17 07/07/2022 10:24 AM    BUN 31 (H) 06/26/2022 12:53 PM    BUN 66 (H) 05/20/2022 03:55 PM    Creatinine 5 76 (H) 07/07/2022 10:24 AM    Creatinine 7 78 (H) 06/26/2022 12:53 PM    Creatinine 12 82 (H) 05/20/2022 03:55 PM    Glucose, Fasting 161 (H) 07/07/2022 10:24 AM    Calcium 8 9 07/07/2022 10:24 AM    Calcium 8 5 06/26/2022 12:53 PM    Calcium 7 8 (L) 05/20/2022 03:55 PM    eGFR 9 07/07/2022 10:24 AM    eGFR 6 06/26/2022 12:53 PM    eGFR 3 05/20/2022 03:55 PM         Portions of the record may have been created with voice recognition software  Occasional wrong word or "sound a like" substitutions may have occurred due to the inherent limitations of voice recognition software  Read the chart carefully and recognize, using context, where substitutions have occurred

## 2022-08-25 NOTE — ASSESSMENT & PLAN NOTE
- Obtain lab work for total and free testosterone, LH/FSH, prolactin, SHBG, estradiol, TSH, and free T4  - Discussed testosterone therapy and penile support devices during today's visit given comorbidities  Based upon lab work review in 4 weeks may consider urology evaluation for penile support device  He is agreeable

## 2022-09-23 ENCOUNTER — APPOINTMENT (OUTPATIENT)
Dept: LAB | Facility: HOSPITAL | Age: 57
End: 2022-09-23
Attending: INTERNAL MEDICINE
Payer: COMMERCIAL

## 2022-09-23 DIAGNOSIS — N52.8 OTHER MALE ERECTILE DYSFUNCTION: Primary | ICD-10-CM

## 2022-09-23 DIAGNOSIS — R53.83 FATIGUE, UNSPECIFIED TYPE: ICD-10-CM

## 2022-09-23 LAB
ESTRADIOL SERPL-MCNC: 46 PG/ML (ref 11–52.5)
FSH SERPL-ACNC: 14.4 MIU/ML (ref 0.7–10.8)
LH SERPL-ACNC: 15.4 MIU/ML (ref 1.2–10.6)
PROLACTIN SERPL-MCNC: 38.4 NG/ML (ref 2.5–17.4)
T4 FREE SERPL-MCNC: 1.36 NG/DL (ref 0.76–1.46)
TSH SERPL DL<=0.05 MIU/L-ACNC: 0.69 UIU/ML (ref 0.45–4.5)

## 2022-09-23 PROCEDURE — 36415 COLL VENOUS BLD VENIPUNCTURE: CPT

## 2022-09-23 PROCEDURE — 84443 ASSAY THYROID STIM HORMONE: CPT

## 2022-09-23 PROCEDURE — 83001 ASSAY OF GONADOTROPIN (FSH): CPT

## 2022-09-23 PROCEDURE — 83002 ASSAY OF GONADOTROPIN (LH): CPT

## 2022-09-23 PROCEDURE — 82670 ASSAY OF TOTAL ESTRADIOL: CPT

## 2022-09-23 PROCEDURE — 84403 ASSAY OF TOTAL TESTOSTERONE: CPT

## 2022-09-23 PROCEDURE — 84439 ASSAY OF FREE THYROXINE: CPT

## 2022-09-23 PROCEDURE — 84146 ASSAY OF PROLACTIN: CPT

## 2022-09-23 PROCEDURE — 84402 ASSAY OF FREE TESTOSTERONE: CPT

## 2022-09-24 LAB
TESTOST FREE SERPL-MCNC: 14.7 PG/ML (ref 7.2–24)
TESTOST SERPL-MCNC: 413 NG/DL (ref 264–916)

## 2022-09-29 ENCOUNTER — OFFICE VISIT (OUTPATIENT)
Dept: ENDOCRINOLOGY | Facility: CLINIC | Age: 57
End: 2022-09-29
Payer: COMMERCIAL

## 2022-09-29 VITALS
BODY MASS INDEX: 36.36 KG/M2 | HEIGHT: 70 IN | WEIGHT: 254 LBS | DIASTOLIC BLOOD PRESSURE: 78 MMHG | SYSTOLIC BLOOD PRESSURE: 128 MMHG | HEART RATE: 63 BPM

## 2022-09-29 DIAGNOSIS — N52.9 ERECTILE DYSFUNCTION, UNSPECIFIED ERECTILE DYSFUNCTION TYPE: Primary | ICD-10-CM

## 2022-09-29 DIAGNOSIS — E66.01 SEVERE OBESITY (BMI 35.0-39.9) WITH COMORBIDITY (HCC): ICD-10-CM

## 2022-09-29 DIAGNOSIS — N25.81 SECONDARY HYPERPARATHYROIDISM OF RENAL ORIGIN (HCC): ICD-10-CM

## 2022-09-29 DIAGNOSIS — E11.51 DIABETES MELLITUS WITH PERIPHERAL CIRCULATORY DISORDER (HCC): ICD-10-CM

## 2022-09-29 DIAGNOSIS — K76.0 NONALCOHOLIC FATTY LIVER DISEASE: ICD-10-CM

## 2022-09-29 PROCEDURE — 99214 OFFICE O/P EST MOD 30 MIN: CPT | Performed by: INTERNAL MEDICINE

## 2022-09-29 NOTE — PROGRESS NOTES
Follow-up Patient Progress Note      CC: erectile dysfunction    History of Present Illness:   59yr male with Type 2 diabetes, DPN, hx of toe amputation, HTN, HLD, HFrEF, Vtach s/p ICD and erectile dysfunction  He seeks opinion on erectile dysfunction  Last visit was 8/25/22  Since last visit, he feels ok  Continues to have erectile dysfunction  Recent testosterone levels were adequate  Gonadotropins and prolactin were high as expected from ESRD  Patient Active Problem List   Diagnosis    Dehiscence of surgical wound    Polyneuropathy    Elevated troponin    Hypertension    History of implantable cardioverter-defibrillator (ICD) placement    Diabetes with ulcer of foot ([address] )    CAD in native artery    Hypokalemia    Anemia    Vitamin D deficiency    Automatic implantable cardiac defibrillator in situ    Constipation    Dyslipidemia, goal LDL below 70    Nonalcoholic fatty liver disease    Paroxysmal ventricular tachycardia (HCC)    Ventricular tachycardia, inducible (HCC)    Skin tag    Recurrent major depressive disorder (HCC)    Other proteinuria    Traumatic amputation of toe of right foot ([address] )    Asymptomatic microscopic hematuria    Cardiomyopathy, dilated ([address] )    Diabetic neuropathy ([address] )    Hypertensive heart and chronic kidney disease without heart failure, with stage 1 through stage 4 chronic kidney disease, or unspecified chronic kidney disease    Neuropathic ulcer of toe ([address] )    Secondary hyperparathyroidism of renal origin ([address] )    Encounter for well adult exam with abnormal findings    Cardiogenic shock ([address] )    Chronic kidney disease with end stage renal failure on dialysis ([address] )    Transaminitis    Ischemic dilated cardiomyopathy ([address] )    Carpal tunnel syndrome of right wrist    Severe obesity (BMI 35 0-39  9) with comorbidity ([address] )    Noncompliance with medication regimen    Acute posthemorrhagic anemia    Anemia in chronic kidney disease    Cocaine abuse in remission ([place] [address] )    Dependence on renal dialysis ([place] [address] )    Diabetes mellitus with peripheral circulatory disorder ([place] [address] )    Immunization refused    Medical marijuana use    Atypical chest pain    ESRD (end stage renal disease) ([place] [address] )    Chronic systolic CHF (congestive heart failure) ([facility])    Drop in hemoglobin    Amputation of toe of left foot ([place] [address] )    COVID-19 virus infection    Depression    Pain and swelling of right lower leg    Other male erectile dysfunction     Past Medical History:   Diagnosis Date    Acute osteomyelitis of metatarsal bone of left foot ([place] [address] ) 6/13/2016    Anemia     BMI 37 0-37 9, adult 10/9/2018    CAD in native artery     Cellulitis of foot, left 2/2/2016    Chronic kidney disease     Depression     Diabetes mellitus ([place] [address] )     History of implantable cardioverter-defibrillator (ICD) placement     Medtronic    Hypertension     Myocardial infarct, old 2006    in setting of cocaine use   Obesity     Peripheral neuropathy     Severe obesity (BMI 35 0-39  9) with comorbidity ([place] [address] ) 8/30/2016    Stage 4 chronic kidney disease ([place] [address] ) 12/13/2018    Toe osteomyelitis, right ([place] [address] ) 1/17/2018    Added automatically from request for surgery [id number]    Ventricular tachycardia [facility])       Past Surgical History:   Procedure Laterality Date    APPENDECTOMY      CARDIAC DEFIBRILLATOR PLACEMENT  2006    MASTECTOMY FOR GYNECOMASTIA  09/2015    REDUCTION MAMMAPLASTY  09/29/2015    TOE AMPUTATION      Hallux amputation    TOE AMPUTATION Right 1/18/2018    Procedure: AMPUTATION 2ND TOE;  Surgeon: Trang Ingram DPM;  [facility];  Service: Podiatry    WOUND DEBRIDEMENT Left 6/16/2016    Procedure: DEBRIDEMENT FOOT/TOE [facility]);  Transmetatasral vs Lisfranc amputation , bone biopsy,;  Surgeon: Trang Ingram DPM;  [facility];  Service:       Family History   Adopted: Yes     Social History     Tobacco Use    Smoking status: Former Smoker     Packs/day: 1 00     Years: 18 00     Pack years: 18 00     Types: Cigarettes     Start date:      Quit date:      Years since quittin 7    Smokeless tobacco: Former User    Tobacco comment: quite smoking    Substance Use Topics    Alcohol use: Yes     Comment: Rarely     Allergies   Allergen Reactions    Penicillins Hives and Other (See Comments)     Ancef TAKEN,Childhood  Other reaction(s): Hives         Current Outpatient Medications:     albuterol (PROVENTIL HFA,VENTOLIN HFA) 90 mcg/act inhaler, Inhale 2 puffs every 6 (six) hours as needed, Disp: , Rfl:     aspirin 81 MG tablet, Take 81 mg by mouth daily, Disp: , Rfl:     atorvastatin (LIPITOR) 20 mg tablet, take 1 tablet by mouth once daily, Disp: 30 tablet, Rfl: 2    Blood Glucose Monitoring Suppl KIT, by Does not apply route 3 (three) times a day ONE TOUCH DEVISE TEST BLOOD SUGARS 3 TIMES DAILY, Disp: 1 each, Rfl: 0    glucose blood test strip, Tests tid, Disp: 100 each, Rfl: 2    Lancets (ONETOUCH ULTRASOFT) lancets, Test tid, Disp: 100 each, Rfl: 2    lidocaine-prilocaine (EMLA) cream, APPLY 1/2 HOUR PRIOR TO DIALYSIS AS DIRECTED, Disp: , Rfl:     metoprolol succinate (TOPROL-XL) 50 mg 24 hr tablet, Take 50 mg by mouth in the morning , Disp: , Rfl:     Adhesive Bandages MISC, by Does not apply route 3 (three) times a day (Patient not taking: Reported on 2022), Disp: 30 each, Rfl: 5    Alcohol Swabs (ALCOHOL PREP) PADS, by Does not apply route 3 (three) times a day (Patient not taking: Reported on 2022), Disp: 100 each, Rfl: 2    Review of Systems   Constitutional: Positive for activity change, appetite change and fatigue  HENT: Negative  Eyes: Negative  Respiratory: Negative  Cardiovascular: Negative for chest pain  Gastrointestinal: Negative  Endocrine: Negative  Genitourinary: Negative  Musculoskeletal: Negative  Skin: Negative  Allergic/Immunologic: Negative  Neurological: Negative  Hematological: Negative  Psychiatric/Behavioral: Negative  All other systems reviewed and are negative  Physical Exam:  Body mass index is 36 45 kg/m²  /78   Pulse 63   Ht 5' 10" (1 778 m)   Wt 115 kg (254 lb)   BMI 36 45 kg/m²    Vitals:    09/29/22 1059   Weight: 115 kg (254 lb)        Physical Exam  Constitutional:       Appearance: He is well-developed  HENT:      Head: Normocephalic  Eyes:      Pupils: Pupils are equal, round, and reactive to light  Neck:      Thyroid: No thyromegaly  Cardiovascular:      Rate and Rhythm: Normal rate  Heart sounds: Normal heart sounds  Pulmonary:      Effort: Pulmonary effort is normal       Breath sounds: Normal breath sounds  Abdominal:      General: Bowel sounds are normal       Palpations: Abdomen is soft  Musculoskeletal:         General: No deformity  Cervical back: Normal range of motion  Skin:     Capillary Refill: Capillary refill takes less than 2 seconds  Coloration: Skin is not pale  Findings: No rash  Neurological:      Mental Status: He is alert and oriented to person, place, and time           Labs:   Lab Results   Component Value Date    HGBA1C 5 1 07/07/2022       Lab Results   Component Value Date    [id number] 0 690 09/23/2022       Lab Results   Component Value Date    CREATININE 5 76 (H) 07/07/2022    CREATININE 7 78 (H) 06/26/2022    CREATININE 12 82 (H) 05/20/2022    BUN 17 07/07/2022    K 3 9 07/07/2022    CL 95 (L) 07/07/2022    CO2 31 (H) 07/07/2022     eGFR   Date Value Ref Range Status   07/07/2022 9 ml/min/1 73sq m Final       Lab Results   Component Value Date    ALT 25 06/26/2022    AST 23 06/26/2022    ALKPHOS 47 06/26/2022       Lab Results   Component Value Date    CHOLESTEROL 122 07/07/2022    CHOLESTEROL 195 08/05/2021    CHOLESTEROL 192 06/09/2020     Lab Results   Component Value Date    HDL 34 (L) 07/07/2022    HDL 37 (L) 08/05/2021    HDL 37 (L) 06/09/2020     Lab Results Component Value Date    TRIG 111 07/07/2022    TRIG 91 08/05/2021    TRIG 117 06/09/2020     Lab Results   Component Value Date    [place] 88 07/07/2022    [place] 155 06/09/2020    [place] 160 11/28/2018         Impression:  1  Erectile dysfunction, unspecified erectile dysfunction type    2  Secondary hyperparathyroidism of renal origin ([facility] )    3  Diabetes mellitus with peripheral circulatory disorder (HCC)    4  Nonalcoholic fatty liver disease    5  Severe obesity (BMI 35 0-39  9) with comorbidity ([facility] )         Plan:    Diagnoses and all orders for this visit:    Erectile dysfunction, unspecified erectile dysfunction type  He has normal gonadal axis with normal testosterone free and total levels  Elevated gonadotropins and prolactin are likely due to reduced clearance due to renal failure  He might benefit from procedures or local treatment options to help erectile dysfucntion  Follow up as needed  -     Ambulatory Referral to Urology; Future    Secondary hyperparathyroidism of renal origin ([facility] )    Diabetes mellitus with peripheral circulatory disorder (HCC)    Nonalcoholic fatty liver disease    Severe obesity (BMI 35 0-39  9) with comorbidity ([facility] )        I have spent 30 minutes with patient today in which greater than 50% of this time was spent in counseling/coordination of care  Discussed with the patient and all questioned fully answered  He will call me if any problems arise  Educated/ Counseled patient on diagnostic test results, prognosis, risk vs benefit of treatment options, importance of treatment compliance, healthy life and lifestyle choices        [address]

## 2022-10-09 DIAGNOSIS — E78.5 DYSLIPIDEMIA, GOAL LDL BELOW 70: ICD-10-CM

## 2022-10-10 RX ORDER — ATORVASTATIN CALCIUM 20 MG/1
TABLET, FILM COATED ORAL
Qty: 30 TABLET | Refills: 2 | Status: SHIPPED | OUTPATIENT
Start: 2022-10-10

## 2022-12-01 ENCOUNTER — OFFICE VISIT (OUTPATIENT)
Dept: UROLOGY | Facility: CLINIC | Age: 57
End: 2022-12-01

## 2022-12-01 VITALS — OXYGEN SATURATION: 96 % | DIASTOLIC BLOOD PRESSURE: 78 MMHG | SYSTOLIC BLOOD PRESSURE: 128 MMHG

## 2022-12-01 DIAGNOSIS — N52.9 ERECTILE DYSFUNCTION, UNSPECIFIED ERECTILE DYSFUNCTION TYPE: ICD-10-CM

## 2022-12-01 DIAGNOSIS — Z12.5 PROSTATE CANCER SCREENING: Primary | ICD-10-CM

## 2022-12-01 NOTE — ASSESSMENT & PLAN NOTE
· Unknown family history  · Check PSA  · Recommend yearly PSA and MAX which can be completed either by Urology or his PCP

## 2022-12-01 NOTE — PATIENT INSTRUCTIONS
Erectile Dysfunction   WHAT YOU NEED TO KNOW:   What is erectile dysfunction (ED)?  ED, or impotence, is when you cannot get or keep an erection for sexual activity  What causes ED? Conditions that lead to nerve problems, such as a spinal cord injury, diabetes, or stroke    Hormone imbalances, such as low testosterone, high prolactin, or a thyroid disorder    Medical conditions that decrease blood flow, such as high blood pressure and arteriosclerosis    Multiple sclerosis or Parkinson disease    Medicines, such as those used to treat depression and high blood pressure    Injury to the testicles from radiation therapy to the testicles    What increases my risk for ED? Obesity    Diabetes that is not controlled or heart disease    Smoking, or drug or alcohol abuse    An enlarged prostate    Increasing age    Spine or groin surgeries    Stress, depression, relationship problems, and anxiety    How is ED diagnosed? Your healthcare provider will ask about your symptoms, medical history, and medicines  He or she will examine your abdomen, penis, and testicles  A rectal exam may also be done to check for an enlarged prostate  Blood and urine tests are done to check for medical conditions that may have caused your ED  You may also need tests to check your blood flow and nerve function  How is ED treated? Treatment depends on the cause of your ED  You may need any of the following:  ED medicines  help you have an erection  These medicines are taken before you have sex  Follow instructions on how to use these medicines  You may have a life-threatening reaction if you mix ED medicines with medicines that contain nitrates  Medicines with nitrates include nitroglycerin and other heart medicines  Testosterone  may be given to increase the levels in your blood and improve your ED  You may need to use a skin cream or wear a patch  Testosterone is also given as an injection      Penis injections  may be done to help improve your blood flow  A vacuum device  is a tube that is placed over the penis  A hand pump is connected to the tube and acts as a vacuum  This may help increase blood flow to the penis  Therapy  may be needed to treat emotional or relationship problems that may be causing your ED  Surgery  may be recommended if other treatments do not work  Surgery includes a penile implant or prosthesis  Surgery may also be done to improve blood flow  Ask for more information about surgeries for ED  How can I decrease my risk for ED? Do not smoke  Smoking can increase your risk for ED  Nicotine and other chemicals in cigarettes and cigars can also cause lung damage  Ask your healthcare provider for information if you currently smoke and need help to quit  E-cigarettes or smokeless tobacco still contain nicotine  Control your blood sugar levels if you have diabetes  Over time, high blood sugar levels can increase your risk for ED  Limit alcohol  Men should limit alcohol to 2 drinks a day  A drink of alcohol is 12 ounces of beer, 5 ounces of wine, or 1½ ounces of liquor  Manage your medical conditions  Eat a variety of healthy foods and stay physically active  Take your medicines as directed  They can help control conditions that may cause ED  Manage stress  Learn ways to relax, such as deep breathing, meditation, and listening to music  Do not use illegal drugs  They increase your risk for ED  When should I call my doctor? You have chest pain, dizziness, or nausea after you take ED medicines or during or after sex  You have an erection for more than 4 hours after you take your ED medicine  You see blood in your urine  You have changes in your vision, headaches, or back pain after you take ED medicine  You have a painful erection  You have questions or concerns about your condition or care  CARE AGREEMENT:   You have the right to help plan your care   Learn about your health condition and how it may be treated  Discuss treatment options with your healthcare providers to decide what care you want to receive  You always have the right to refuse treatment  The above information is an  only  It is not intended as medical advice for individual conditions or treatments  Talk to your doctor, nurse or pharmacist before following any medical regimen to see if it is safe and effective for you  © Copyright [employer] 2022 Information is for End User's use only and may not be sold, redistributed or otherwise used for commercial purposes   All illustrations and images included in CareNotes® are the copyrighted property of [employer]  or [address] [place] [address]

## 2022-12-01 NOTE — ASSESSMENT & PLAN NOTE
· Had recent metabolic workup by endocrinology  · Prior use of sildenafil which was ineffective at 50 mg  · He is interested in trial of medication or intracavernosal injections  · Recommend cardiac clearance prior to initiating any treatment for erectile dysfunction, will need letter from cardiologist stating he is healthy enough for sexual intercourse

## 2022-12-01 NOTE — PROGRESS NOTES
Assessment and plan:     Erectile dysfunction  · Had recent metabolic workup by endocrinology  · Prior use of sildenafil which was ineffective at 50 mg  · He is interested in trial of medication or intracavernosal injections  · Recommend cardiac clearance prior to initiating any treatment for erectile dysfunction, will need letter from cardiologist stating he is healthy enough for sexual intercourse    Prostate cancer screening  · Unknown family history  · Check PSA  · Recommend yearly PSA and MAX which can be completed either by Urology or his PCP      MIGUEL Frausto    History of Present Illness     Vasiliy Goodman  is a 61 y o  new patient who presents for erectile dysfunction  This started over 10 years ago  He takes ultimate forza supplements from [facility] for 3 years which helps somewhat  He was previously on viagra 50 mg about 5 years ago which was minimally effective  He reports difficulty initiating and maintaining erections  He saw endocrinology secondary to his erectile issues and had free testosterone, LH, FSH, prolactin, estradiol, TSH, free T4 and SHBG ordered  His prolactin level was elevated at 38 4, LH 15 4 with normal testosterone, normal estradiol, normal thyroid testing  He reports ability to obtain an erection is minimal  He has to hold his penis, it is only semi rigid  He is interested in trialing another medication or IC injections  I recommend he obtain cardiac clearance prior to any medication ordering  Will need a letter from his cardiologist stating he is healthy enough for sexual intercourse  Unknown if family history of prostate cancer because he was adopted  Last PSA was in 2018 and was 0  4  we discussed the importance of screening due to ethnicity making him a high risk  He is on dialysis and urinates 2-3 times a day  Denies urinary symptoms      He has a history of nonalcoholic fatty disease, diabetes with diabetic neuropathy(a1c 5 4) hyperparathyroidism, hypertension, CAD, V-tach, cardiomyopathy, CKD stage 4 on renal dialysis, cardiogenic shock, ischemic dilated cardiomyopathy, chronic congestive heart failure, polyneuropathy, microscopic hematuria, cardiac defibrillator, obesity, history of cocaine abuse in remission, medical marijuana use, amputation left foot, depression, prior history of gonorrhea when he was younger and erectile dysfunction  He had a mastectomy in 2019 due to gynecomastia  He was on spironolactone previously after an MI in 2006  He is going to have evaluation in [place] for a heart and kidney transplant  He was adopted and does not know his family history  Laboratory     Lab Results   Component Value Date    BUN 17 07/07/2022    CREATININE 5 76 (H) 07/07/2022       No components found for: GFR    Lab Results   Component Value Date    CALCIUM 8 9 07/07/2022    K 3 9 07/07/2022    CO2 31 (H) 07/07/2022    CL 95 (L) 07/07/2022       Lab Results   Component Value Date    WBC 5 89 07/07/2022    HGB 11 2 (L) 07/07/2022    HCT 35 5 (L) 07/07/2022     (H) 07/07/2022     07/07/2022       Lab Results   Component Value Date    PSA 0 4 11/28/2018       No results found for this or any previous visit (from the past 1 hour(s))  @RESULT(URINEMICROSCOPIC)@    @RESULT(URINECULTURE)@    Radiology       Review of Systems     Review of Systems   Constitutional: Positive for fatigue  Negative for activity change, appetite change, chills, fever and unexpected weight change  HENT: Negative for facial swelling  Eyes: Negative for discharge  Respiratory: Negative  Negative for cough and shortness of breath  [place]   Cardiovascular: Negative for chest pain and leg swelling  Gastrointestinal: Negative  Negative for abdominal distention, abdominal pain, constipation, diarrhea, nausea and vomiting  Endocrine: Negative      Genitourinary: Negative for decreased urine volume, difficulty urinating, dysuria, enuresis, flank pain, frequency, genital sores, hematuria, scrotal swelling, testicular pain and urgency  On dialysis   Musculoskeletal: Positive for gait problem  Negative for back pain and myalgias  Skin: Negative for pallor and rash  Allergic/Immunologic: Negative  Negative for immunocompromised state  Neurological: Negative for facial asymmetry and speech difficulty  Psychiatric/Behavioral: Negative for agitation and confusion  Allergies     Allergies   Allergen Reactions   • Penicillins Hives and Other (See Comments)     Ancef TAKEN,Childhood  Other reaction(s): Hives       Physical Exam     Physical Exam  Vitals reviewed  Constitutional:       General: He is not in acute distress  Appearance: Normal appearance  He is obese  He is not ill-appearing, toxic-appearing or diaphoretic  HENT:      Head: Normocephalic and atraumatic  Eyes:      General: No scleral icterus  Cardiovascular:      Rate and Rhythm: Normal rate  Pulmonary:      Effort: Pulmonary effort is normal  No respiratory distress  Abdominal:      General: Abdomen is flat  There is no distension  Palpations: Abdomen is soft  Tenderness: There is no abdominal tenderness  There is no right CVA tenderness, left CVA tenderness, guarding or rebound  Genitourinary:     Penis: Circumcised  No hypospadias, erythema, tenderness, discharge, swelling or lesions  Testes:         Right: Mass, tenderness or swelling not present  Right testis is descended  Left: Mass, tenderness or swelling not present  Left testis is descended  Epididymis:      Right: Not inflamed  No tenderness  Left: Not inflamed  No tenderness  Comments: Prostate smooth nontender without appreciable nodules or indurations  Musculoskeletal:         General: No swelling  Cervical back: Normal range of motion  Comments: Left metatarsal amputation, 2 toes removed from the right   Skin:     General: Skin is warm and dry  Coloration: Skin is not jaundiced or pale  Findings: No rash  Neurological:      General: No focal deficit present  Mental Status: He is alert and oriented to person, place, and time  Gait: Gait abnormal       Comments: Wheelchair  Ambulates with a cane   Psychiatric:         Mood and Affect: Mood normal          Behavior: Behavior normal          Thought Content:  Thought content normal          Judgment: Judgment normal          Vital Signs     Vitals:    12/01/22 1305   BP: 128/78   BP Location: Left arm   Patient Position: Sitting   Cuff Size: Adult   SpO2: 96%       Current Medications       Current Outpatient Medications:   •  albuterol (PROVENTIL HFA,VENTOLIN HFA) 90 mcg/act inhaler, Inhale 2 puffs every 6 (six) hours as needed, Disp: , Rfl:   •  aspirin 81 MG tablet, Take 81 mg by mouth daily, Disp: , Rfl:   •  atorvastatin (LIPITOR) 20 mg tablet, take 1 tablet by mouth once daily, Disp: 30 tablet, Rfl: 2  •  Blood Glucose Monitoring Suppl KIT, by Does not apply route 3 (three) times a day ONE TOUCH DEVISE TEST BLOOD SUGARS 3 TIMES DAILY, Disp: 1 each, Rfl: 0  •  glucose blood test strip, Tests tid, Disp: 100 each, Rfl: 2  •  Lancets (ONETOUCH ULTRASOFT) lancets, Test tid, Disp: 100 each, Rfl: 2  •  lidocaine-prilocaine (EMLA) cream, APPLY 1/2 HOUR PRIOR TO DIALYSIS AS DIRECTED, Disp: , Rfl:   •  metoprolol succinate (TOPROL-XL) 50 mg 24 hr tablet, Take 50 mg by mouth in the morning , Disp: , Rfl:   •  Adhesive Bandages MISC, by Does not apply route 3 (three) times a day (Patient not taking: Reported on 9/29/2022), Disp: 30 each, Rfl: 5  •  Alcohol Swabs (ALCOHOL PREP) PADS, by Does not apply route 3 (three) times a day (Patient not taking: Reported on 9/29/2022), Disp: 100 each, Rfl: 2    Active Problems     Patient Active Problem List   Diagnosis   • Dehiscence of surgical wound   • Polyneuropathy   • Elevated troponin   • Hypertension   • History of implantable cardioverter-defibrillator (ICD) placement   • Diabetes with ulcer of foot ([place] [address] )   • CAD in native artery   • Hypokalemia   • Anemia   • Vitamin D deficiency   • Automatic implantable cardiac defibrillator in situ   • Constipation   • Dyslipidemia, goal LDL below 70   • Nonalcoholic fatty liver disease   • Paroxysmal ventricular tachycardia   • Ventricular tachycardia, inducible   • Skin tag   • Recurrent major depressive disorder ([facility])   • Other proteinuria   • Traumatic amputation of toe of right foot ([place] [address] )   • Asymptomatic microscopic hematuria   • Cardiomyopathy, dilated ([facility])   • Diabetic neuropathy ([facility])   • Hypertensive heart and chronic kidney disease without heart failure, with stage 1 through stage 4 chronic kidney disease, or unspecified chronic kidney disease   • Neuropathic ulcer of toe ([facility])   • Secondary hyperparathyroidism of renal origin ([place] [address] )   • Encounter for well adult exam with abnormal findings   • Cardiogenic shock ([place] [address] )   • Chronic kidney disease with end stage renal failure on dialysis [facility])   • Transaminitis   • Ischemic dilated cardiomyopathy ([place] [address] )   • Carpal tunnel syndrome of right wrist   • Severe obesity (BMI 35 0-39  9) with comorbidity ([place] [address] )   • Noncompliance with medication regimen   • Acute posthemorrhagic anemia   • Anemia in chronic kidney disease   • Cocaine abuse in remission ([place] [address] )   • Dependence on renal dialysis ([place] [address] )   • Diabetes mellitus with peripheral circulatory disorder ([place] [address] )   • Immunization refused   • Medical marijuana use   • Atypical chest pain   • ESRD (end stage renal disease) ([place] [address] )   • Chronic systolic CHF (congestive heart failure) ([facility])   • Drop in hemoglobin   • Amputation of toe of left foot ([facility])   • COVID-19 virus infection   • Depression   • Pain and swelling of right lower leg   • Erectile dysfunction   • Prostate cancer screening       Past Medical History     Past Medical History:   Diagnosis Date   • Acute osteomyelitis of metatarsal bone of left foot ([place] [address] ) 6/13/2016   • Anemia • BMI 37 0-37 9, adult 10/9/2018   • CAD in native artery    • Cellulitis of foot, left 2016   • Chronic kidney disease    • Depression    • Diabetes mellitus ([facility] )    • History of implantable cardioverter-defibrillator (ICD) placement     [employer]   • Hypertension    • Myocardial infarct, old     in setting of cocaine use  • Obesity    • Peripheral neuropathy    • Severe obesity (BMI 35 0-39  9) with comorbidity ([facility] ) 2016   • Stage 4 chronic kidney disease ([facility] ) 2018   • Toe osteomyelitis, right ([facility] ) 2018    Added automatically from request for surgery [id number]   • Ventricular tachycardia        Surgical History     Past Surgical History:   Procedure Laterality Date   • APPENDECTOMY     • CARDIAC DEFIBRILLATOR PLACEMENT     • MASTECTOMY FOR GYNECOMASTIA  2015   • REDUCTION MAMMAPLASTY  2015   • TOE AMPUTATION      Hallux amputation   • TOE AMPUTATION Right 2018    Procedure: AMPUTATION 2ND TOE;  Surgeon: Kwabena Thomas DPM;  [facility]: [facility];  Service: Podiatry   • WOUND DEBRIDEMENT Left 2016    Procedure: DEBRIDEMENT FOOT/TOE [facility] OUT);  Transmetatasral vs Lisfranc amputation , bone biopsy,;  Surgeon: Kwabena Thomas DPM;  [facility];  Service:        Family History     Family History   Adopted: Yes       Social History     Social History     Social History     Tobacco Use   Smoking Status Former   • Packs/day: 1 00   • Years: 18 00   • Pack years: 18 00   • Types: Cigarettes   • Start date:    • Quit date:    • Years since quittin 9   Smokeless Tobacco Former   Tobacco Comments    quite smoking        Past Surgical History:   Procedure Laterality Date   • APPENDECTOMY     • CARDIAC DEFIBRILLATOR PLACEMENT     • MASTECTOMY FOR GYNECOMASTIA  2015   • REDUCTION MAMMAPLASTY  2015   • TOE AMPUTATION      Hallux amputation   • TOE AMPUTATION Right 2018    Procedure: AMPUTATION 2ND TOE;  Surgeon: Kwabena Thomas DPM;  [facility];  Service: Podiatry   • WOUND DEBRIDEMENT Left 6/16/2016    Procedure: DEBRIDEMENT FOOT/TOE [facility] OUT); Transmetatasral vs Lisfranc amputation , bone biopsy,;  Surgeon: Franco De Los Santos DPM;  Location[facility];  Service: The following portions of the patient's history were reviewed and updated as appropriate: allergies, current medications, past family history, past medical history, past social history, past surgical history and problem list    Please note :  Voice dictation software has been used to create this document  There may be inadvertent transcription errors      [id number] [address] [place]

## 2023-04-19 NOTE — DISCHARGE INSTRUCTIONS
CC:  Reagan Gibbs is here today for Office Visit and Physical    Medications: currently is not taking any medications  Added preferred pharmacy  Refills needed today?  NO  denies Latex allergy or sensitivity  Health Maintenance Due   Topic Date Due   • Hepatitis C Screening  Never done   • COVID-19 Vaccine (3 - Booster for Pfizer series) 09/02/2021     Patient is due for topics as listed above but is not proceeding with Immunization(s) COVID-19 at this time.  Depression screening done                        Podiatry: Visiting nurses to change dressing qod with xeroform, DSD, kerlix, ACE  Follow-up with Dr Ace Loser  May be partial weight-bearing to the heel of the right lower extremity with use of surgical shoe and crutch or walker assist  Thank you

## 2024-08-03 NOTE — ED NOTES
Spoke with Dr Partida Notice regarding patient not wanting to let us use our lancets to obtain blood  Dr Partida Notice suggesting that we let the patient use our lancets instead of us sticking him  Will ask patient if he does the fingerstick, if we can do glucose check       Yamilka Lindquist RN  01/17/18 0065 following/physical therapy

## (undated) DEVICE — REM POLYHESIVE ADULT PATIENT RETURN ELECTRODE: Brand: VALLEYLAB

## (undated) DEVICE — SPONGE LAP 18 X 18 IN

## (undated) DEVICE — CUFF TOURNIQUET 18 X 4 IN QUICK CONNECT DISP 1 BLADDER

## (undated) DEVICE — ABDOMINAL PAD: Brand: DERMACEA

## (undated) DEVICE — 2000CC GUARDIAN II: Brand: GUARDIAN

## (undated) DEVICE — INTENDED FOR TISSUE SEPARATION, AND OTHER PROCEDURES THAT REQUIRE A SHARP SURGICAL BLADE TO PUNCTURE OR CUT.: Brand: BARD-PARKER ® CARBON RIB-BACK BLADES

## (undated) DEVICE — BLADE SAGITTAL 25.6 X 9.5MM

## (undated) DEVICE — GLOVE INDICATOR PI UNDERGLOVE SZ 6.5 BLUE

## (undated) DEVICE — GLOVE SRG BIOGEL 6

## (undated) DEVICE — ACE WRAP 4 IN UNSTERILE

## (undated) DEVICE — NEEDLE 18 G X 1 1/2

## (undated) DEVICE — SYRINGE 10ML LL

## (undated) DEVICE — PAD CAST 4 IN COTTON NON STERILE

## (undated) DEVICE — OCCLUSIVE GAUZE STRIP,3% BISMUTH TRIBROMOPHENATE IN PETROLATUM BLEND: Brand: XEROFORM

## (undated) DEVICE — WEBRIL 6 IN UNSTERILE

## (undated) DEVICE — UNIVERSAL  MINOR EXTREMITY PK: Brand: CARDINAL HEALTH

## (undated) DEVICE — NEEDLE 25G X 1 1/2

## (undated) DEVICE — 10FR FRAZIER SUCTION HANDLE: Brand: CARDINAL HEALTH

## (undated) DEVICE — KERLIX BANDAGE ROLL: Brand: KERLIX

## (undated) DEVICE — TUBING SUCTION 5MM X 12 FT